# Patient Record
Sex: FEMALE | Race: WHITE | NOT HISPANIC OR LATINO | Employment: OTHER | URBAN - METROPOLITAN AREA
[De-identification: names, ages, dates, MRNs, and addresses within clinical notes are randomized per-mention and may not be internally consistent; named-entity substitution may affect disease eponyms.]

---

## 2017-04-24 ENCOUNTER — OFFICE VISIT (OUTPATIENT)
Dept: FAMILY MEDICINE CLINIC | Facility: CLINIC | Age: 72
End: 2017-04-24

## 2017-04-24 VITALS
OXYGEN SATURATION: 96 % | BODY MASS INDEX: 18.37 KG/M2 | WEIGHT: 107 LBS | TEMPERATURE: 97.7 F | DIASTOLIC BLOOD PRESSURE: 50 MMHG | HEART RATE: 72 BPM | SYSTOLIC BLOOD PRESSURE: 110 MMHG

## 2017-04-24 DIAGNOSIS — R41.3 MEMORY LOSS: ICD-10-CM

## 2017-04-24 DIAGNOSIS — R07.89 ATYPICAL CHEST PAIN: ICD-10-CM

## 2017-04-24 DIAGNOSIS — G62.9 NEUROPATHY: ICD-10-CM

## 2017-04-24 DIAGNOSIS — M79.10 MYALGIA: ICD-10-CM

## 2017-04-24 DIAGNOSIS — G24.9 DYSTONIA: ICD-10-CM

## 2017-04-24 DIAGNOSIS — R53.83 OTHER FATIGUE: ICD-10-CM

## 2017-04-24 DIAGNOSIS — R13.19 OTHER DYSPHAGIA: Primary | ICD-10-CM

## 2017-04-24 LAB
25(OH)D3+25(OH)D2 SERPL-MCNC: 53.8 NG/ML (ref 30–100)
ALBUMIN SERPL-MCNC: 4.3 G/DL (ref 3.5–5.2)
ALBUMIN/GLOB SERPL: 2 G/DL
ALP SERPL-CCNC: 60 U/L (ref 39–117)
ALT SERPL-CCNC: 28 U/L (ref 1–33)
AST SERPL-CCNC: 32 U/L (ref 1–32)
BASOPHILS # BLD AUTO: 0.05 10*3/MM3 (ref 0–0.2)
BASOPHILS NFR BLD AUTO: 1.1 % (ref 0–1.5)
BILIRUB SERPL-MCNC: 0.4 MG/DL (ref 0.1–1.2)
BUN SERPL-MCNC: 10 MG/DL (ref 8–23)
BUN/CREAT SERPL: 13 (ref 7–25)
CALCIUM SERPL-MCNC: 9.3 MG/DL (ref 8.6–10.5)
CHLORIDE SERPL-SCNC: 101 MMOL/L (ref 98–107)
CO2 SERPL-SCNC: 27.6 MMOL/L (ref 22–29)
CREAT SERPL-MCNC: 0.77 MG/DL (ref 0.57–1)
EOSINOPHIL # BLD AUTO: 0.19 10*3/MM3 (ref 0–0.7)
EOSINOPHIL NFR BLD AUTO: 4.1 % (ref 0.3–6.2)
ERYTHROCYTE [DISTWIDTH] IN BLOOD BY AUTOMATED COUNT: 15.8 % (ref 11.7–13)
GLOBULIN SER CALC-MCNC: 2.1 GM/DL
GLUCOSE SERPL-MCNC: 81 MG/DL (ref 65–99)
HCT VFR BLD AUTO: 41.6 % (ref 35.6–45.5)
HGB BLD-MCNC: 13.5 G/DL (ref 11.9–15.5)
IMM GRANULOCYTES # BLD: 0 10*3/MM3 (ref 0–0.03)
IMM GRANULOCYTES NFR BLD: 0 % (ref 0–0.5)
LYMPHOCYTES # BLD AUTO: 1.69 10*3/MM3 (ref 0.9–4.8)
LYMPHOCYTES NFR BLD AUTO: 36 % (ref 19.6–45.3)
MAGNESIUM SERPL-MCNC: 2.4 MG/DL (ref 1.6–2.4)
MCH RBC QN AUTO: 31.2 PG (ref 26.9–32)
MCHC RBC AUTO-ENTMCNC: 32.5 G/DL (ref 32.4–36.3)
MCV RBC AUTO: 96.1 FL (ref 80.5–98.2)
MONOCYTES # BLD AUTO: 0.34 10*3/MM3 (ref 0.2–1.2)
MONOCYTES NFR BLD AUTO: 7.2 % (ref 5–12)
NEUTROPHILS # BLD AUTO: 2.42 10*3/MM3 (ref 1.9–8.1)
NEUTROPHILS NFR BLD AUTO: 51.6 % (ref 42.7–76)
PLATELET # BLD AUTO: 218 10*3/MM3 (ref 140–500)
POTASSIUM SERPL-SCNC: 4.1 MMOL/L (ref 3.5–5.2)
PROT SERPL-MCNC: 6.4 G/DL (ref 6–8.5)
RBC # BLD AUTO: 4.33 10*6/MM3 (ref 3.9–5.2)
SODIUM SERPL-SCNC: 143 MMOL/L (ref 136–145)
TSH SERPL DL<=0.005 MIU/L-ACNC: 2.92 MIU/ML (ref 0.27–4.2)
VIT B12 SERPL-MCNC: 352 PG/ML (ref 211–946)
WBC # BLD AUTO: 4.69 10*3/MM3 (ref 4.5–10.7)

## 2017-04-24 PROCEDURE — 99214 OFFICE O/P EST MOD 30 MIN: CPT | Performed by: FAMILY MEDICINE

## 2017-04-24 RX ORDER — METOPROLOL SUCCINATE 25 MG/1
TABLET, EXTENDED RELEASE ORAL
COMMUNITY
Start: 2016-09-09 | End: 2019-02-07 | Stop reason: DRUGHIGH

## 2017-04-24 RX ORDER — IBUPROFEN 600 MG/1
600 TABLET ORAL EVERY 6 HOURS PRN
COMMUNITY
End: 2017-04-24 | Stop reason: SDUPTHER

## 2017-04-24 RX ORDER — IBUPROFEN 100 MG/1
TABLET, CHEWABLE ORAL
COMMUNITY
End: 2017-04-24

## 2017-04-24 RX ORDER — IBUPROFEN 600 MG/1
600 TABLET ORAL EVERY 6 HOURS PRN
Qty: 360 TABLET | Refills: 1 | Status: SHIPPED | OUTPATIENT
Start: 2017-04-24 | End: 2018-12-27 | Stop reason: HOSPADM

## 2017-04-24 NOTE — PROGRESS NOTES
Subjective   Kesha Cha is a 71 y.o. female. Presents today for   Chief Complaint   Patient presents with   • Follow-up     med check and fasting labs.  Numbness lt hand thumb and index finger.  Swelling inbilateral ankles.  Difficulty swallowing over several years and getting worse.  Getting cramps at night in bilateral legs.  Would like to find exercise to help.         Chest Pain    Episode onset: None for few days;  off/on for few years. The onset quality is gradual. The problem occurs intermittently. The problem has been waxing and waning. The pain is present in the substernal region. The pain is mild. The quality of the pain is described as tightness. The pain does not radiate. Associated symptoms include numbness. Pertinent negatives include no abdominal pain, dizziness, nausea, palpitations, shortness of breath or vomiting. Associated symptoms comments: Pain can last 1 hour. The pain is aggravated by nothing (Not associated with exertion, eating, coughing or breathing.). Treatments tried: Ibuprofen. Improvement on treatment: Ibuprofen resolves pain.   Pertinent negatives for past medical history include no CAD, no CHF, no DVT, no MI and no PE. Prior workup: Had negative nuclear stress and normal ECHO 2014 for same pain.  Patient relates is chronic.  Saw cardiology and EKG unchanged last check.   Lower Extremity Issue   Chronicity: Nocturnal leg cramps;  Denies claudication. The current episode started more than 1 month ago. The problem occurs intermittently. Associated symptoms include chest pain, fatigue, myalgias and numbness. Pertinent negatives include no abdominal pain, arthralgias, nausea or vomiting. Nothing aggravates the symptoms. Treatments tried: REmote past given stretches to do that helped, but doesn't recall. The treatment provided mild relief.   Upper Extremity Issue   This is a new problem. The current episode started more than 1 month ago. The problem occurs intermittently. The problem  has been waxing and waning. Associated symptoms include chest pain, fatigue, myalgias and numbness. Pertinent negatives include no abdominal pain, arthralgias, nausea or vomiting. Associated symptoms comments: 1st 3 digits left hand numb off/on; Notes at night as well;  No weakness, denies dropping objects.  . Nothing aggravates the symptoms. She has tried nothing for the symptoms. The treatment provided no relief.     Patient with ongoing dysphagia;  Having issues with pills, liquid and food.  Has had ongoing complaints for sometime.  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  Small hiatal hernia also noted.  Has not had EGD in past;  Denies abdominal pain, n/v or heartburn.      Patient concerned by progressive memory loss, we have performed in office mmse for patient, but she is concerned as feels worsening.    Review of Systems   Constitutional: Positive for fatigue.   HENT: Positive for trouble swallowing.    Respiratory: Negative for choking, shortness of breath and wheezing.    Cardiovascular: Positive for chest pain. Negative for palpitations and leg swelling.   Gastrointestinal: Negative for abdominal pain, blood in stool, nausea and vomiting.   Musculoskeletal: Positive for myalgias. Negative for arthralgias.   Neurological: Positive for numbness. Negative for dizziness, syncope and light-headedness.       The following portions of the patient's history were reviewed and updated as appropriate: allergies, current medications, past medical history, past surgical history and problem list.    Patient Active Problem List   Diagnosis   • Atopic rhinitis   • Acute otitis media   • Isolated cervical dystonia   • Radial styloid tenosynovitis   • Degeneration of intervertebral disc of lumbar region   • Disorder of jaw   • Diverticulosis of intestine   • Dyslipidemia   • Dysphagia   •  Idiopathic torsion dystonia   • Post-menopausal osteoporosis   • Osteoporosis   • Palpitations   • Reactive airway disease   • Scoliosis   • Arthralgia of temporomandibular joint   • Upper respiratory tract infection   • Atypical chest pain   • Chest pain   • Dystonia       No Known Allergies    Current Outpatient Prescriptions on File Prior to Visit   Medication Sig Dispense Refill   • Multiple Vitamins-Minerals (PX MENS MULTIVITAMINS) tablet Take  by mouth.     • [DISCONTINUED] metoprolol succinate XL (TOPROL-XL) 25 MG 24 hr tablet Take  by mouth.     • [DISCONTINUED] montelukast (SINGULAIR) 10 MG tablet Take  by mouth Daily.       No current facility-administered medications on file prior to visit.        Objective   Vitals:    04/24/17 0926   BP: 110/50   Pulse: 72   Temp: 97.7 °F (36.5 °C)   SpO2: 96%   Weight: 107 lb (48.5 kg)       Physical Exam   Constitutional: She appears well-developed and well-nourished.   HENT:   Head: Normocephalic and atraumatic.   Neck: Neck supple. No JVD present. No thyromegaly present.   Cardiovascular: Normal rate, regular rhythm and normal heart sounds.  Exam reveals no gallop and no friction rub.    No murmur heard.  Pulmonary/Chest: Effort normal and breath sounds normal. No respiratory distress. She has no wheezes. She has no rales.   Abdominal: Soft. Bowel sounds are normal. She exhibits no distension. There is no tenderness. There is no rebound and no guarding.   Musculoskeletal: She exhibits no edema.        Right hand: She exhibits normal range of motion, no tenderness, no bony tenderness, normal capillary refill and no deformity. Normal sensation noted. Decreased sensation is not present in the ulnar distribution and is not present in the radial distribution. Normal strength noted. She exhibits no finger abduction, no thumb/finger opposition and no wrist extension trouble.        Left hand: She exhibits normal range of motion, no tenderness, no bony tenderness, normal  capillary refill, no deformity and no swelling. Decreased sensation noted. Decreased sensation is present in the medial distribution. Decreased sensation is not present in the ulnar distribution and is not present in the radial distribution. Normal strength noted. She exhibits no finger abduction, no thumb/finger opposition and no wrist extension trouble.   Negative tinels on left and right   Neurological: She is alert.   Skin: Skin is warm and dry.   Psychiatric: She has a normal mood and affect. Her behavior is normal.   Nursing note and vitals reviewed.      Assessment/Plan   Kesha was seen today for follow-up.    Diagnoses and all orders for this visit:    Other dysphagia  -     Ambulatory Referral to Gastroenterology  -     Ambulatory Referral to Neuropsychology  -     CBC & Differential  -     Comprehensive Metabolic Panel  -     Magnesium  -     Vitamin D 25 Hydroxy  -     Vitamin B12  -     TSH    Memory loss  -     Ambulatory Referral to Gastroenterology  -     Ambulatory Referral to Neuropsychology  -     CBC & Differential  -     Comprehensive Metabolic Panel  -     Magnesium  -     Vitamin D 25 Hydroxy  -     Vitamin B12  -     TSH    Myalgia  -     Ambulatory Referral to Gastroenterology  -     Ambulatory Referral to Neuropsychology  -     CBC & Differential  -     Comprehensive Metabolic Panel  -     Magnesium  -     Vitamin D 25 Hydroxy  -     Vitamin B12  -     TSH    Atypical chest pain  -     Ambulatory Referral to Gastroenterology  -     Ambulatory Referral to Neuropsychology  -     CBC & Differential  -     Comprehensive Metabolic Panel  -     Magnesium  -     Vitamin D 25 Hydroxy  -     Vitamin B12  -     TSH    Dystonia  -     Ambulatory Referral to Gastroenterology  -     Ambulatory Referral to Neuropsychology  -     CBC & Differential  -     Comprehensive Metabolic Panel  -     Magnesium  -     Vitamin D 25 Hydroxy  -     Vitamin B12  -     TSH    Other fatigue  -     Ambulatory Referral to  Gastroenterology  -     Ambulatory Referral to Neuropsychology  -     CBC & Differential  -     Comprehensive Metabolic Panel  -     Magnesium  -     Vitamin D 25 Hydroxy  -     Vitamin B12  -     TSH    Neuropathy  -     Ambulatory Referral to Gastroenterology  -     Ambulatory Referral to Neuropsychology  -     CBC & Differential  -     Comprehensive Metabolic Panel  -     Magnesium  -     Vitamin D 25 Hydroxy  -     Vitamin B12  -     TSH    Other orders  -     ibuprofen (ADVIL,MOTRIN) 600 MG tablet; Take 1 tablet by mouth Every 6 (Six) Hours As Needed for Mild Pain (1-3).    -patient reports CP, but relates unchanged and infrequent from prior and when work-up in past.  Takes ibuprofen which relieves, sounds more musculoskeletal, does have f/u with cardiology soon.  -neuropathy left hand, possible CTS, will check some labs as can seen neuropathies with thyroid, b12 def and also c/o fatigue as well as memory loss  -recommend neuropsych exam and agreeable today  -nocturnal leg cramps - check lytes, thyroid, cbc and gave stretches to perform  -dysphagia - will refer to GI for evaluation.           -Follow up: after w/u        Current Outpatient Prescriptions:   •  ascorbic acid (VITAMIN C) 1500 MG tablet controlled-release CR tablet, Take  by mouth., Disp: , Rfl:   •  DIGEST ENZYMES-ANTICHOLINERGIC PO, Take  by mouth., Disp: , Rfl:   •  GLUCOSAMINE HCL PO, Take  by mouth., Disp: , Rfl:   •  ibuprofen (ADVIL,MOTRIN) 600 MG tablet, Take 1 tablet by mouth Every 6 (Six) Hours As Needed for Mild Pain (1-3)., Disp: 360 tablet, Rfl: 1  •  metoprolol succinate XL (TOPROL-XL) 25 MG 24 hr tablet, TAKE 1 TABLET EVERY DAY, Disp: , Rfl:   •  Multiple Vitamins-Minerals (PX MENS MULTIVITAMINS) tablet, Take  by mouth., Disp: , Rfl:   •  onabotulinumtoxina (BOTOX) 100 UNITS reconstituted solution injection, Inject as directed, Disp: , Rfl:   •  SELENIUM PO, Take  by mouth., Disp: , Rfl:

## 2017-05-08 ENCOUNTER — OFFICE VISIT (OUTPATIENT)
Dept: FAMILY MEDICINE CLINIC | Facility: CLINIC | Age: 72
End: 2017-05-08

## 2017-05-08 VITALS
BODY MASS INDEX: 18.44 KG/M2 | SYSTOLIC BLOOD PRESSURE: 110 MMHG | WEIGHT: 108 LBS | DIASTOLIC BLOOD PRESSURE: 60 MMHG | OXYGEN SATURATION: 96 % | HEART RATE: 79 BPM | TEMPERATURE: 97.8 F | HEIGHT: 64 IN

## 2017-05-08 DIAGNOSIS — M81.0 OSTEOPOROSIS: ICD-10-CM

## 2017-05-08 DIAGNOSIS — Z91.81 AT HIGH RISK FOR FALLS: ICD-10-CM

## 2017-05-08 DIAGNOSIS — Z00.00 MEDICARE ANNUAL WELLNESS VISIT, INITIAL: Primary | ICD-10-CM

## 2017-05-08 DIAGNOSIS — R26.9 GAIT ABNORMALITY: ICD-10-CM

## 2017-05-08 DIAGNOSIS — M81.0 POST-MENOPAUSAL OSTEOPOROSIS: ICD-10-CM

## 2017-05-08 DIAGNOSIS — Z12.31 ENCOUNTER FOR SCREENING MAMMOGRAM FOR MALIGNANT NEOPLASM OF BREAST: ICD-10-CM

## 2017-05-08 DIAGNOSIS — Z12.39 SCREENING FOR MALIGNANT NEOPLASM OF BREAST: ICD-10-CM

## 2017-05-08 PROCEDURE — G0439 PPPS, SUBSEQ VISIT: HCPCS | Performed by: FAMILY MEDICINE

## 2017-06-06 ENCOUNTER — TELEPHONE (OUTPATIENT)
Dept: FAMILY MEDICINE CLINIC | Facility: CLINIC | Age: 72
End: 2017-06-06

## 2017-06-06 NOTE — TELEPHONE ENCOUNTER
That isn't that bad of a BP.  Sent for gait, balance and dizziness.  Also did we giver her the vestibular exercise handout (it's in filing cabinent) by your desk?  Keep an eye on BP and let me know if bottom number stays above 90.

## 2017-06-08 NOTE — TELEPHONE ENCOUNTER
torin just called and told me pt cancelled her appt. She told them she is having chest pain and pressure and feeling very lethargic. Ok to call and tell her to go to ER?

## 2017-06-08 NOTE — TELEPHONE ENCOUNTER
Pt said she laid down and took a nap, felt better when she woke up, has been to the grocery and done housework, and is no longer having chest pressure

## 2017-06-08 NOTE — TELEPHONE ENCOUNTER
Call and check on patient after lunch.  Having chest pressure and dizziness is concerning.  Very unfortunate not going as could potentially be life threatening.    RRJ

## 2017-06-08 NOTE — TELEPHONE ENCOUNTER
I called patient, told her you want her to go to ER asap. She said she is not going to go, she just wants to rest. I told her she should not wait, and really should go get checked out since she is having chest pressure, she again told me she is not going to go, and is going to stay home and rest.

## 2017-06-16 ENCOUNTER — HOSPITAL ENCOUNTER (OUTPATIENT)
Dept: HOSPITAL 23 - COPS | Age: 72
Discharge: HOME | End: 2017-06-16
Attending: INTERNAL MEDICINE
Payer: MEDICARE

## 2017-06-16 DIAGNOSIS — K29.60: ICD-10-CM

## 2017-06-16 DIAGNOSIS — Z79.899: ICD-10-CM

## 2017-06-16 DIAGNOSIS — M19.90: ICD-10-CM

## 2017-06-16 DIAGNOSIS — J44.9: ICD-10-CM

## 2017-06-16 DIAGNOSIS — D64.9: ICD-10-CM

## 2017-06-16 DIAGNOSIS — M41.9: ICD-10-CM

## 2017-06-16 DIAGNOSIS — K22.2: Primary | ICD-10-CM

## 2017-06-16 DIAGNOSIS — Z90.710: ICD-10-CM

## 2017-06-16 DIAGNOSIS — I10: ICD-10-CM

## 2017-06-16 DIAGNOSIS — Z90.721: ICD-10-CM

## 2017-06-16 DIAGNOSIS — Z98.890: ICD-10-CM

## 2017-07-11 ENCOUNTER — TREATMENT (OUTPATIENT)
Dept: PHYSICAL THERAPY | Facility: CLINIC | Age: 72
End: 2017-07-11

## 2017-07-11 ENCOUNTER — TRANSCRIBE ORDERS (OUTPATIENT)
Dept: PHYSICAL THERAPY | Facility: CLINIC | Age: 72
End: 2017-07-11

## 2017-07-11 DIAGNOSIS — M25.561 PAIN IN BOTH KNEES, UNSPECIFIED CHRONICITY: Primary | ICD-10-CM

## 2017-07-11 DIAGNOSIS — M25.562 PAIN IN BOTH KNEES, UNSPECIFIED CHRONICITY: Primary | ICD-10-CM

## 2017-07-11 DIAGNOSIS — M17.0 OSTEOARTHRITIS OF BOTH KNEES, UNSPECIFIED OSTEOARTHRITIS TYPE: Primary | ICD-10-CM

## 2017-07-11 PROCEDURE — 97110 THERAPEUTIC EXERCISES: CPT | Performed by: PHYSICAL THERAPIST

## 2017-07-11 PROCEDURE — G8979 MOBILITY GOAL STATUS: HCPCS | Performed by: PHYSICAL THERAPIST

## 2017-07-11 PROCEDURE — 97161 PT EVAL LOW COMPLEX 20 MIN: CPT | Performed by: PHYSICAL THERAPIST

## 2017-07-11 PROCEDURE — 97530 THERAPEUTIC ACTIVITIES: CPT | Performed by: PHYSICAL THERAPIST

## 2017-07-11 PROCEDURE — G8978 MOBILITY CURRENT STATUS: HCPCS | Performed by: PHYSICAL THERAPIST

## 2017-07-11 NOTE — PATIENT INSTRUCTIONS
Educated about Dx and exam findings, rationale and POC. Gave handout on HEP with instructions.  Educated about knee anatomy and contributing factors and basic protection principles.

## 2017-07-11 NOTE — PROGRESS NOTES
Physical Therapy Initial Evaluation and Plan of Care        Subjective Evaluation    History of Present Illness  Date of surgery: none.  Mechanism of injury: Insidious - pain since the 80s.  Recently started walking in the neighborhood and knees flared up badly.  Also with dystonia which affects muscles.      Patient Occupation: retired teacher Pain  Current pain ratin  At worst pain ratin  Location: ant knees  Quality: sharp  Relieving factors: rest  Aggravating factors: stairs, ambulation and squatting  Progression: worsening    Social Support  Lives in: multiple-level home  Lives with: alone    Diagnostic Tests  X-ray: abnormal (arthritis)    Treatments  Treatments tried: none.  Current treatment comments: none.     Patient Goals  Patient goals for therapy: decreased pain and return to sport/leisure activities  Patient goal: STGs x 2 wk  1. Increasing flexibility for improved ADLs  2. Pain with WB ADLs < 6//10  3. Review body mechanics and joint protection principles with ADLs  4. Ambulates level surface and 4 inch steps with min to no antalgia    LTGs x 4 wks  1. Functional squat for ADLs  2. Negative special testing for derangement  3. Ambulates  flight of stairs with normal gait  4. Demonstrates tolerance for walking for 30 minutes for exercise and ADLs           Objective     Tenderness     Additional Tenderness Details  No TTP    Active Range of Motion   Left Knee   Normal active range of motion    Right Knee   Normal active range of motion    Patellar Mobility   Left Knee Patellar tendons within functional limits include the medial and lateral. Hypomobile in the left superior and left inferior patellar tendon(s).     Right Knee Patellar tendons within functional limits include the medial, lateral, superior and inferior.     Additional Patellar Mobility Details  +crepitus lory    Strength/Myotome Testing     Left Knee   Normal strength    Right Knee   Normal strength    Additional Strength  Details  Hips WNL lory    Tests     Left Knee   Positive patellar compression.   Negative valgus stress test at 30 degrees and varus stress test at 30 degrees.     Right Knee   Positive patellar compression.   Negative valgus stress test at 30 degrees and varus stress test at 30 degrees.     Additional Tests Details  Mod-severe tightness HS, quad, ITB, gastroc    Ambulation     Ambulation: Level Surfaces   Ambulation without assistive device: independent    Additional Level Surfaces Ambulation Details  No notable limp         Assessment & Plan     Assessment  Impairments: abnormal muscle tone, activity intolerance, lacks appropriate home exercise program and pain with function  Assessment details: 70 yo F with recent flare-up of pain lory knees from increased walking presents with mod-severe hip/knee mm tightness, Hx of dystonia and abnormal PF mechanics.  Barriers to therapy: dystonia  Prognosis: fair    Goals  STGs x 2 wk  1. Increasing flexibility for improved ADLs  2. Pain with WB ADLs < 6//10  3. Review body mechanics and joint protection principles with ADLs  4. Ambulates level surface and 4 inch steps with min to no antalgia    LTGs x 4 wks  1. Functional squat for ADLs  2. Negative special testing for derangement  3. Ambulates  flight of stairs with normal gait  4. Demonstrates tolerance for walking for 30 minutes for exercise and ADLs    Plan  Therapy options: will be seen for skilled physical therapy services  Planned modality interventions: cryotherapy, thermotherapy (hydrocollator packs) and electrical stimulation/Russian stimulation  Planned therapy interventions: body mechanics training, flexibility, functional ROM exercises, home exercise program, strengthening, stretching and manual therapy  Frequency: 3x week  Duration in weeks: 4  Treatment plan discussed with: patient  Functional Limitations: walking, uncomfortable because of pain, standing, stooping and unable to perform repetitive  tasks      Manual Therapy:    0     mins  95281;  Therapeutic Exercise:    16     mins  57759;     Neuromuscular Xuan:    0    mins  21282;    Therapeutic Activity:     10     mins  28808;     Gait Trainin     mins  43945;     Ultrasound:     0     mins  98885;    Work Hardening           0      mins 55344  Iontophoresis               0   mins 34383    Timed Treatment:   26   mins   Total Treatment:     50   mins    PT SIGNATURE: Darby Huber, PT   DATE TREATMENT INITIATED: 2017    Medicare Initial Certification  Certification Period: 10/9/2017  I certify that the therapy services are furnished while this patient is under my care.  The services outlined above are required by this patient, and will be reviewed every 90 days.     PHYSICIAN: Eleuterio Samaniego MD      DATE:     Please sign and return via fax to [phone].. Thank you, [facility].

## 2017-07-14 ENCOUNTER — TREATMENT (OUTPATIENT)
Dept: PHYSICAL THERAPY | Facility: CLINIC | Age: 72
End: 2017-07-14

## 2017-07-14 DIAGNOSIS — M17.0 OSTEOARTHRITIS OF BOTH KNEES, UNSPECIFIED OSTEOARTHRITIS TYPE: Primary | ICD-10-CM

## 2017-07-14 PROCEDURE — 97530 THERAPEUTIC ACTIVITIES: CPT | Performed by: PHYSICAL THERAPIST

## 2017-07-14 PROCEDURE — 97110 THERAPEUTIC EXERCISES: CPT | Performed by: PHYSICAL THERAPIST

## 2017-07-14 NOTE — PROGRESS NOTES
Physical Therapy Daily Progress Note        Visit # : 2  Kesha Cha reports: I am more sore after I do the exercises    Subjective     Objective     Palpation     Additional Palpation Details  Palpable crepitus with knee flexion AROM    Tenderness     Right Knee   Tenderness in the MCL (distal) and medial joint line.      See Exercise, Manual, and Modality Logs for complete treatment.       Assessment & Plan     Assessment  Assessment details: Ms. Cha presents with no significant improvement in bilateral knee pain. Tenderness, crepitus and pain end-range persists. Educated patient about arthritis and need for strengthening and stabilization. Tolerated all progressions well. Cont PT 2x per week for bilateral knee stabilization - next visit attempt standing HS curl, and/or resisted knee flexion and extension if tolerated.         Progress strengthening /stabilization /functional activity           Manual Therapy:         mins  44079;  Therapeutic Exercise:   30     mins  59232;     Neuromuscular Xuan:        mins  40195;    Therapeutic Activity:     20     mins  01224;     Gait Training:           mins  84977;     Ultrasound:          mins  79092;    Electrical Stimulation:         mins  13349 ( );  Dry Needling          mins self-pay    Timed Treatment:   50   mins   Total Treatment:     60   mins    Bambi Bang, PT  Physical Therapist  [place] License # [id number]

## 2017-10-17 ENCOUNTER — TELEPHONE (OUTPATIENT)
Dept: FAMILY MEDICINE CLINIC | Facility: CLINIC | Age: 72
End: 2017-10-17

## 2017-10-17 DIAGNOSIS — Z91.81 PERSONAL HISTORY OF FALL: Primary | ICD-10-CM

## 2017-10-17 DIAGNOSIS — R26.9 ABNORMALITY OF GAIT: ICD-10-CM

## 2017-10-25 ENCOUNTER — TELEPHONE (OUTPATIENT)
Dept: FAMILY MEDICINE CLINIC | Facility: CLINIC | Age: 72
End: 2017-10-25

## 2017-11-03 ENCOUNTER — OFFICE VISIT (OUTPATIENT)
Dept: FAMILY MEDICINE CLINIC | Facility: CLINIC | Age: 72
End: 2017-11-03

## 2017-11-03 VITALS
BODY MASS INDEX: 18.1 KG/M2 | HEART RATE: 74 BPM | DIASTOLIC BLOOD PRESSURE: 70 MMHG | WEIGHT: 106 LBS | OXYGEN SATURATION: 96 % | TEMPERATURE: 98.3 F | HEIGHT: 64 IN | SYSTOLIC BLOOD PRESSURE: 118 MMHG

## 2017-11-03 DIAGNOSIS — R07.89 ATYPICAL CHEST PAIN: Primary | ICD-10-CM

## 2017-11-03 PROCEDURE — 99213 OFFICE O/P EST LOW 20 MIN: CPT | Performed by: FAMILY MEDICINE

## 2017-11-03 RX ORDER — ACETYLCARNITINE HCL 100 %
POWDER (GRAM) MISCELLANEOUS
COMMUNITY
End: 2018-12-27 | Stop reason: HOSPADM

## 2017-11-03 RX ORDER — UBIDECARENONE 60 MG
CAPSULE ORAL
COMMUNITY
End: 2018-12-27 | Stop reason: HOSPADM

## 2017-11-03 NOTE — PROGRESS NOTES
Subjective   Kesha Cha is a 71 y.o. female. Presents today for   Chief Complaint   Patient presents with   • Medication Problem      pt was taking alpha hgh. she was having pressure in her head, chest discomfort, and nose bleed that was hard to stop.        History of Present Illness  Patient here for above;  Was taking ALPHA HGH GLYCERYL PHOSPHORYL CHOLINE WITH HOMEOPATHIC GROWTH HORMONE and developed head pressure, chest discomfort and a nose bleed.  Did stop supplement and symptoms completely resolved;  No further chest pain;       Review of Systems   Respiratory: Negative for shortness of breath.    Cardiovascular: Negative for chest pain and palpitations.   Gastrointestinal: Negative for abdominal pain, nausea and vomiting.   Skin: Negative for rash.       The following portions of the patient's history were reviewed and updated as appropriate: allergies, current medications, past medical history and problem list.    Patient Active Problem List   Diagnosis   • Atopic rhinitis   • Acute otitis media   • Isolated cervical dystonia   • Radial styloid tenosynovitis   • Degeneration of intervertebral disc of lumbar region   • Disorder of jaw   • Diverticulosis of intestine   • Dyslipidemia   • Dysphagia   • Idiopathic torsion dystonia   • Post-menopausal osteoporosis   • Osteoporosis   • Palpitations   • Reactive airway disease   • Scoliosis   • Arthralgia of temporomandibular joint   • Upper respiratory tract infection   • Atypical chest pain   • Chest pain   • Dystonia       No Known Allergies    Current Outpatient Prescriptions on File Prior to Visit   Medication Sig Dispense Refill   • ascorbic acid (VITAMIN C) 1500 MG tablet controlled-release CR tablet Take  by mouth.     • DIGEST ENZYMES-ANTICHOLINERGIC PO Take  by mouth.     • GLUCOSAMINE HCL PO Take  by mouth.     • GLUTATHIONE PO Take  by mouth.     • ibuprofen (ADVIL,MOTRIN) 600 MG tablet Take 1 tablet by mouth Every 6 (Six) Hours As Needed for  "Mild Pain (1-3). 360 tablet 1   • metoprolol succinate XL (TOPROL-XL) 25 MG 24 hr tablet TAKE 1 TABLET EVERY DAY     • Multiple Vitamins-Minerals (PX MENS MULTIVITAMINS) tablet Take  by mouth.     • onabotulinumtoxina (BOTOX) 100 UNITS reconstituted solution injection Inject as directed     • SELENIUM PO Take  by mouth.       No current facility-administered medications on file prior to visit.        Objective   Vitals:    11/03/17 1322   BP: 118/70   BP Location: Left arm   Patient Position: Sitting   Cuff Size: Adult   Pulse: 74   Temp: 98.3 °F (36.8 °C)   TempSrc: Oral   SpO2: 96%   Weight: 106 lb (48.1 kg)   Height: 64\" (162.6 cm)       Physical Exam   Constitutional: She is oriented to person, place, and time. She appears well-developed and well-nourished.   Neurological: She is alert and oriented to person, place, and time.   Skin: Skin is warm and dry.   Psychiatric: She has a normal mood and affect. Her behavior is normal.   Nursing note and vitals reviewed.      Assessment/Plan   Kesah was seen today for medication problem.    Diagnoses and all orders for this visit:    Atypical chest pain      -avoid supplement;  D/w at length healthy diet and regular exercise to maintain health and wellness       -Follow up: 6 months       Current Outpatient Prescriptions:   •  Acetylcarnitine HCl (ACETYL-L-CARNITINE HCL) powder, Take  by mouth., Disp: , Rfl:   •  ascorbic acid (VITAMIN C) 1500 MG tablet controlled-release CR tablet, Take  by mouth., Disp: , Rfl:   •  Coenzyme Q10 60 MG capsule, Take  by mouth., Disp: , Rfl:   •  DIGEST ENZYMES-ANTICHOLINERGIC PO, Take  by mouth., Disp: , Rfl:   •  GLUCOSAMINE HCL PO, Take  by mouth., Disp: , Rfl:   •  GLUTATHIONE PO, Take  by mouth., Disp: , Rfl:   •  ibuprofen (ADVIL,MOTRIN) 600 MG tablet, Take 1 tablet by mouth Every 6 (Six) Hours As Needed for Mild Pain (1-3)., Disp: 360 tablet, Rfl: 1  •  IODINE EX, by Per G Tube route., Disp: , Rfl:   •  MAGNESIUM PO, Take  by " mouth., Disp: , Rfl:   •  metoprolol succinate XL (TOPROL-XL) 25 MG 24 hr tablet, TAKE 1 TABLET EVERY DAY, Disp: , Rfl:   •  Multiple Vitamins-Minerals (PX MENS MULTIVITAMINS) tablet, Take  by mouth., Disp: , Rfl:   •  NIACIN PO, Take  by mouth., Disp: , Rfl:   •  onabotulinumtoxina (BOTOX) 100 UNITS reconstituted solution injection, Inject as directed, Disp: , Rfl:   •  SELENIUM PO, Take  by mouth., Disp: , Rfl:

## 2018-03-14 ENCOUNTER — TELEPHONE (OUTPATIENT)
Dept: FAMILY MEDICINE CLINIC | Facility: CLINIC | Age: 73
End: 2018-03-14

## 2018-03-16 DIAGNOSIS — E78.5 DYSLIPIDEMIA: ICD-10-CM

## 2018-03-16 DIAGNOSIS — E55.9 VITAMIN D DEFICIENCY: Primary | ICD-10-CM

## 2018-03-29 LAB
25(OH)D3+25(OH)D2 SERPL-MCNC: 63.2 NG/ML (ref 30–100)
ALBUMIN SERPL-MCNC: 4.6 G/DL (ref 3.5–5.2)
ALBUMIN/GLOB SERPL: 2.6 G/DL
ALP SERPL-CCNC: 57 U/L (ref 39–117)
ALT SERPL-CCNC: 25 U/L (ref 1–33)
AST SERPL-CCNC: 30 U/L (ref 1–32)
BILIRUB SERPL-MCNC: 0.5 MG/DL (ref 0.1–1.2)
BUN SERPL-MCNC: 15 MG/DL (ref 8–23)
BUN/CREAT SERPL: 19.5 (ref 7–25)
CALCIUM SERPL-MCNC: 9.5 MG/DL (ref 8.6–10.5)
CHLORIDE SERPL-SCNC: 101 MMOL/L (ref 98–107)
CHOLEST SERPL-MCNC: 203 MG/DL (ref 0–200)
CO2 SERPL-SCNC: 30.6 MMOL/L (ref 22–29)
CREAT SERPL-MCNC: 0.77 MG/DL (ref 0.57–1)
GFR SERPLBLD CREATININE-BSD FMLA CKD-EPI: 73 ML/MIN/1.73
GFR SERPLBLD CREATININE-BSD FMLA CKD-EPI: 89 ML/MIN/1.73
GLOBULIN SER CALC-MCNC: 1.8 GM/DL
GLUCOSE SERPL-MCNC: 75 MG/DL (ref 65–99)
HDLC SERPL-MCNC: 86 MG/DL (ref 40–60)
LDLC SERPL CALC-MCNC: 105 MG/DL (ref 0–100)
POTASSIUM SERPL-SCNC: 4.2 MMOL/L (ref 3.5–5.2)
PROT SERPL-MCNC: 6.4 G/DL (ref 6–8.5)
SODIUM SERPL-SCNC: 143 MMOL/L (ref 136–145)
TRIGL SERPL-MCNC: 58 MG/DL (ref 0–150)
VLDLC SERPL CALC-MCNC: 11.6 MG/DL (ref 5–40)

## 2018-04-03 ENCOUNTER — OFFICE VISIT (OUTPATIENT)
Dept: FAMILY MEDICINE CLINIC | Facility: CLINIC | Age: 73
End: 2018-04-03

## 2018-04-03 VITALS
OXYGEN SATURATION: 96 % | HEART RATE: 79 BPM | SYSTOLIC BLOOD PRESSURE: 102 MMHG | TEMPERATURE: 97.6 F | HEIGHT: 64 IN | BODY MASS INDEX: 17.75 KG/M2 | WEIGHT: 104 LBS | DIASTOLIC BLOOD PRESSURE: 60 MMHG

## 2018-04-03 DIAGNOSIS — G24.3 ISOLATED CERVICAL DYSTONIA: ICD-10-CM

## 2018-04-03 DIAGNOSIS — E78.5 DYSLIPIDEMIA: Primary | ICD-10-CM

## 2018-04-03 DIAGNOSIS — I25.10 CORONARY ARTERY DISEASE INVOLVING NATIVE CORONARY ARTERY OF NATIVE HEART WITHOUT ANGINA PECTORIS: ICD-10-CM

## 2018-04-03 PROCEDURE — 99213 OFFICE O/P EST LOW 20 MIN: CPT | Performed by: FAMILY MEDICINE

## 2018-04-03 NOTE — PROGRESS NOTES
Subjective   Kesha Cha is a 72 y.o. female. Presents today for   Chief Complaint   Patient presents with   • Appointment     pt here for med and lab review       History of Present Illness    Patient reports had CT noted non-obstructive CAD and sees cardiology annually.   Had preappt labs cholesterol good, though not goal technically for atherosclerosis.  Review of Systems    The following portions of the patient's history were reviewed and updated as appropriate: allergies, current medications, past medical history and problem list.    Patient Active Problem List   Diagnosis   • Atopic rhinitis   • Acute otitis media   • Isolated cervical dystonia   • Radial styloid tenosynovitis   • Degeneration of intervertebral disc of lumbar region   • Disorder of jaw   • Diverticulosis of intestine   • Dyslipidemia   • Dysphagia   • Idiopathic torsion dystonia   • Post-menopausal osteoporosis   • Osteoporosis   • Palpitations   • Reactive airway disease   • Scoliosis   • Arthralgia of temporomandibular joint   • Upper respiratory tract infection   • Atypical chest pain   • Chest pain   • Dystonia       No Known Allergies    Current Outpatient Prescriptions on File Prior to Visit   Medication Sig Dispense Refill   • Acetylcarnitine HCl (ACETYL-L-CARNITINE HCL) powder Take  by mouth.     • ascorbic acid (VITAMIN C) 1500 MG tablet controlled-release CR tablet Take  by mouth.     • Coenzyme Q10 60 MG capsule Take  by mouth.     • DIGEST ENZYMES-ANTICHOLINERGIC PO Take  by mouth.     • GLUCOSAMINE HCL PO Take  by mouth.     • GLUTATHIONE PO Take  by mouth.     • ibuprofen (ADVIL,MOTRIN) 600 MG tablet Take 1 tablet by mouth Every 6 (Six) Hours As Needed for Mild Pain (1-3). 360 tablet 1   • IODINE EX by Per G Tube route.     • MAGNESIUM PO Take  by mouth.     • metoprolol succinate XL (TOPROL-XL) 25 MG 24 hr tablet TAKE 1 TABLET EVERY DAY     • Multiple Vitamins-Minerals (PX MENS MULTIVITAMINS) tablet Take  by mouth.     •  "NIACIN PO Take  by mouth.     • onabotulinumtoxina (BOTOX) 100 UNITS reconstituted solution injection Inject as directed     • SELENIUM PO Take  by mouth.       No current facility-administered medications on file prior to visit.        Objective   Vitals:    18 1314   BP: 102/60   BP Location: Left arm   Patient Position: Sitting   Cuff Size: Adult   Pulse: 79   Temp: 97.6 °F (36.4 °C)   TempSrc: Oral   SpO2: 96%   Weight: 47.2 kg (104 lb)   Height: 162.6 cm (64.02\")       Physical Exam  Result Impression   PROCEDURE/STUDY NOTE    FACILITY:         [facility]  UNIT/AGE/GENDER:  CT   CLI     71Y    F  PATIENT NAME/: DUARTE ELENA        1945  UNIT NUMBER:      [id number]  ACCOUNT NUMBER: [id number]  ACCESSION NUMBER: [id number]             DATE: 2017    REFERRING PHYSICIAN(S): BRENDAN Amado M.D.    INDICATION(S): Chest pains.     FINDING(S):  Calcium score:   Left main: 0.    Left anterior descendin.68.     Circumflex: 3.81.     Right coronary artery: 33.87.     Total Agatston score: 142.36     Left main arises from the left sinus and is normal and bifurcates into   left anterior descending and circumflex coronary arteries.     Left anterior descending artery is a medium to large caliber vessel which   shows proximal 30-40% stenosis x2 and then luminal irregularities.    Septal branch normal.     Large diagonal 1: Normal.    Small diagonal 2: Normal.     Circumflex is a medium caliber vessel with luminal irregularities and   otherwise normal.     Large marginal normal.     Small second marginal normal.   RCA: Arises from the right sinus and is the dominant vessel and is normal   except for mild calcific plaques and luminal irregularities.     Marginal vessel normal.     Posterior descending normal.       Left lateral branch normal.    Normal right and left ventricular wall motion with a calculated left   ventricular ejection fraction of 62% and right ventricular " ejection   fraction of 64%. Please refer to the radiology report for the non-cardiac   part of the CT examination.      LETICIA JACOBS M.D.    D: 05/30/2017 17:05:55  T: 05/30/2017 18:10:43/\[facility] [place] [facility]\""  Rodriguez ADHIKARI [id number]    COPY TO:      PRELIMINARY - SIGNED COPY IS FINAL     Component      Latest Ref Rng & Units 4/16/2015 4/24/2017 3/29/2018   Glucose      65 - 99 mg/dL 78 81 75   BUN      8 - 23 mg/dL 12 10 15   Creatinine      0.57 - 1.00 mg/dL 0.74 0.77 0.77   eGFR Non African Amer      >60 mL/min/1.73 >60 73 73   eGFR  African Amer      >60 mL/min/1.73 >60 89 89   BUN/Creatinine Ratio      7.0 - 25.0 16 13.0 19.5   Sodium      136 - 145 mmol/L 141 143 143   Potassium      3.5 - 5.2 mmol/L 4.1 4.1 4.2   Chloride      98 - 107 mmol/L 100 101 101   Total CO2      22.0 - 29.0 mmol/L 28 27.6 30.6 (H)   Calcium      8.6 - 10.5 mg/dL 9.4 9.3 9.5   Total Protein      6.0 - 8.5 g/dL 6.7 6.4 6.4   Albumin      3.50 - 5.20 g/dL 4.5 4.30 4.60   Globulin      gm/dL 2.2 2.1 1.8   A/G Ratio      g/dL 2.0 2.0 2.6   Total Bilirubin      0.1 - 1.2 mg/dL 0.4 0.4 0.5   Alkaline Phosphatase      39 - 117 U/L 66 60 57   AST (SGOT)      1 - 32 U/L 25 32 30   ALT (SGPT)      1 - 33 U/L 19 28 25   Total Cholesterol      0 - 200 mg/dL 204 (H)  203 (H)   Triglycerides      0 - 150 mg/dL 79  58   HDL Cholesterol      40 - 60 mg/dL 84 (H)  86 (H)   VLDL Cholesterol      5 - 40 mg/dL 16  11.6   LDL Cholesterol       0 - 100 mg/dL 104 (H)  105 (H)   25 Hydroxy, Vitamin D      30.0 - 100.0 ng/ml   63.2     Assessment/Plan   Kesha was seen today for appointment.    Diagnoses and all orders for this visit:    Dyslipidemia    Isolated cervical dystonia    Coronary artery disease involving native coronary artery of native heart without angina pectoris        -given non-obstructive cad, recommend consider statin therapy;  rajiv will discuss with cardiology       -Follow up: 12 months       Current Outpatient Prescriptions:   •  Acetylcarnitine HCl  (ACETYL-L-CARNITINE HCL) powder, Take  by mouth., Disp: , Rfl:   •  ascorbic acid (VITAMIN C) 1500 MG tablet controlled-release CR tablet, Take  by mouth., Disp: , Rfl:   •  Coenzyme Q10 60 MG capsule, Take  by mouth., Disp: , Rfl:   •  DIGEST ENZYMES-ANTICHOLINERGIC PO, Take  by mouth., Disp: , Rfl:   •  GLUCOSAMINE HCL PO, Take  by mouth., Disp: , Rfl:   •  GLUTATHIONE PO, Take  by mouth., Disp: , Rfl:   •  ibuprofen (ADVIL,MOTRIN) 600 MG tablet, Take 1 tablet by mouth Every 6 (Six) Hours As Needed for Mild Pain (1-3)., Disp: 360 tablet, Rfl: 1  •  IODINE EX, by Per G Tube route., Disp: , Rfl:   •  MAGNESIUM PO, Take  by mouth., Disp: , Rfl:   •  metoprolol succinate XL (TOPROL-XL) 25 MG 24 hr tablet, TAKE 1 TABLET EVERY DAY, Disp: , Rfl:   •  Multiple Vitamins-Minerals (PX MENS MULTIVITAMINS) tablet, Take  by mouth., Disp: , Rfl:   •  NIACIN PO, Take  by mouth., Disp: , Rfl:   •  onabotulinumtoxina (BOTOX) 100 UNITS reconstituted solution injection, Inject as directed, Disp: , Rfl:   •  SELENIUM PO, Take  by mouth., Disp: , Rfl:

## 2018-04-03 NOTE — PROGRESS NOTES
Subjective   Kesha Cha is a 72 y.o. female. Presents today for   Chief Complaint   Patient presents with   • Appointment     pt here for med and lab review       Hyperlipidemia   This is a chronic problem. The current episode started more than 1 year ago. Condition status: Has been fine, though since last check on CTA 40% LAD lesion noted.   Not on statin tx. Recent lipid tests were reviewed and are high. She has no history of diabetes or hypothyroidism. Factors aggravating her hyperlipidemia include beta blockers. Associated symptoms include chest pain (chronic chest pain that is unchanged.  Has dystonia and attributes to this.). Pertinent negatives include no shortness of breath. She is currently on no antihyperlipidemic treatment. Improvement on treatment: HDL excellent, LDL okay but not goal for CAD. There are no compliance problems.  Risk factors for coronary artery disease include dyslipidemia and post-menopausal.       Reviewed labs with patient vitamin D normal,  CMP normal    Review of Systems   Respiratory: Negative for shortness of breath.    Cardiovascular: Positive for chest pain (chronic chest pain that is unchanged.  Has dystonia and attributes to this.).       The following portions of the patient's history were reviewed and updated as appropriate: allergies, current medications, past medical history and problem list.    Patient Active Problem List   Diagnosis   • Atopic rhinitis   • Acute otitis media   • Isolated cervical dystonia   • Radial styloid tenosynovitis   • Degeneration of intervertebral disc of lumbar region   • Disorder of jaw   • Diverticulosis of intestine   • Dyslipidemia   • Dysphagia   • Idiopathic torsion dystonia   • Post-menopausal osteoporosis   • Osteoporosis   • Palpitations   • Reactive airway disease   • Scoliosis   • Arthralgia of temporomandibular joint   • Upper respiratory tract infection   • Atypical chest pain   • Chest pain   • Dystonia       No Known  "Allergies    Current Outpatient Prescriptions on File Prior to Visit   Medication Sig Dispense Refill   • Acetylcarnitine HCl (ACETYL-L-CARNITINE HCL) powder Take  by mouth.     • ascorbic acid (VITAMIN C) 1500 MG tablet controlled-release CR tablet Take  by mouth.     • Coenzyme Q10 60 MG capsule Take  by mouth.     • DIGEST ENZYMES-ANTICHOLINERGIC PO Take  by mouth.     • GLUCOSAMINE HCL PO Take  by mouth.     • GLUTATHIONE PO Take  by mouth.     • ibuprofen (ADVIL,MOTRIN) 600 MG tablet Take 1 tablet by mouth Every 6 (Six) Hours As Needed for Mild Pain (1-3). 360 tablet 1   • IODINE EX by Per G Tube route.     • MAGNESIUM PO Take  by mouth.     • metoprolol succinate XL (TOPROL-XL) 25 MG 24 hr tablet TAKE 1 TABLET EVERY DAY     • Multiple Vitamins-Minerals (PX MENS MULTIVITAMINS) tablet Take  by mouth.     • NIACIN PO Take  by mouth.     • onabotulinumtoxina (BOTOX) 100 UNITS reconstituted solution injection Inject as directed     • SELENIUM PO Take  by mouth.       No current facility-administered medications on file prior to visit.        Objective   Vitals:    04/03/18 1314   BP: 102/60   BP Location: Left arm   Patient Position: Sitting   Cuff Size: Adult   Pulse: 79   Temp: 97.6 °F (36.4 °C)   TempSrc: Oral   SpO2: 96%   Weight: 47.2 kg (104 lb)   Height: 162.6 cm (64.02\")       Physical Exam   Constitutional: She appears well-developed and well-nourished.   HENT:   Head: Normocephalic and atraumatic.   Neck: Neck supple. No JVD present. No thyromegaly present.   Cardiovascular: Normal rate, regular rhythm and normal heart sounds.  Exam reveals no gallop and no friction rub.    No murmur heard.  Pulmonary/Chest: Effort normal and breath sounds normal. No respiratory distress. She has no wheezes. She has no rales.   Abdominal: Soft. Bowel sounds are normal. She exhibits no distension. There is no tenderness. There is no rebound and no guarding.   Musculoskeletal: She exhibits no edema.   Neurological: She is " alert.   Skin: Skin is warm and dry.   Psychiatric: She has a normal mood and affect. Her behavior is normal.   Nursing note and vitals reviewed.      Assessment/Plan   Kesha was seen today for appointment.    Diagnoses and all orders for this visit:    Dyslipidemia    Isolated cervical dystonia    Coronary artery disease involving native coronary artery of native heart without angina pectoris    I d/w lipid results overall excellent, but given CAD hx is not goal and would consider statin therapy.  Patient to see cardiology next week, will take labs and discuss.         -Follow up: 1 year       Current Outpatient Prescriptions:   •  Acetylcarnitine HCl (ACETYL-L-CARNITINE HCL) powder, Take  by mouth., Disp: , Rfl:   •  ascorbic acid (VITAMIN C) 1500 MG tablet controlled-release CR tablet, Take  by mouth., Disp: , Rfl:   •  Coenzyme Q10 60 MG capsule, Take  by mouth., Disp: , Rfl:   •  DIGEST ENZYMES-ANTICHOLINERGIC PO, Take  by mouth., Disp: , Rfl:   •  GLUCOSAMINE HCL PO, Take  by mouth., Disp: , Rfl:   •  GLUTATHIONE PO, Take  by mouth., Disp: , Rfl:   •  ibuprofen (ADVIL,MOTRIN) 600 MG tablet, Take 1 tablet by mouth Every 6 (Six) Hours As Needed for Mild Pain (1-3)., Disp: 360 tablet, Rfl: 1  •  IODINE EX, by Per G Tube route., Disp: , Rfl:   •  MAGNESIUM PO, Take  by mouth., Disp: , Rfl:   •  metoprolol succinate XL (TOPROL-XL) 25 MG 24 hr tablet, TAKE 1 TABLET EVERY DAY, Disp: , Rfl:   •  Multiple Vitamins-Minerals (PX MENS MULTIVITAMINS) tablet, Take  by mouth., Disp: , Rfl:   •  NIACIN PO, Take  by mouth., Disp: , Rfl:   •  onabotulinumtoxina (BOTOX) 100 UNITS reconstituted solution injection, Inject as directed, Disp: , Rfl:   •  SELENIUM PO, Take  by mouth., Disp: , Rfl:

## 2018-08-31 ENCOUNTER — TELEPHONE (OUTPATIENT)
Dept: FAMILY MEDICINE CLINIC | Facility: CLINIC | Age: 73
End: 2018-08-31

## 2018-09-25 ENCOUNTER — TELEPHONE (OUTPATIENT)
Dept: FAMILY MEDICINE CLINIC | Facility: CLINIC | Age: 73
End: 2018-09-25

## 2018-09-26 NOTE — TELEPHONE ENCOUNTER
They have a commercial (prescription only) version called deplin that is 15,000 mcg (15mg) and I prescribe it for certain clinical issues.  I think fine with dose.

## 2018-12-23 ENCOUNTER — HOSPITAL ENCOUNTER (INPATIENT)
Facility: HOSPITAL | Age: 73
LOS: 3 days | Discharge: NURSING, REHAB OR LONG-TERM CARE | End: 2018-12-27
Attending: EMERGENCY MEDICINE | Admitting: INTERNAL MEDICINE

## 2018-12-23 ENCOUNTER — APPOINTMENT (OUTPATIENT)
Dept: GENERAL RADIOLOGY | Facility: HOSPITAL | Age: 73
End: 2018-12-23

## 2018-12-23 DIAGNOSIS — R74.01 ELEVATED TRANSAMINASE LEVEL: ICD-10-CM

## 2018-12-23 DIAGNOSIS — M25.552 LEFT HIP PAIN: ICD-10-CM

## 2018-12-23 DIAGNOSIS — S70.02XA CONTUSION OF LEFT HIP, INITIAL ENCOUNTER: ICD-10-CM

## 2018-12-23 DIAGNOSIS — R26.89 UNABLE TO BEAR WEIGHT: ICD-10-CM
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
## 2018-12-23 PROCEDURE — 72220 X-RAY EXAM SACRUM TAILBONE: CPT

## 2018-12-23 PROCEDURE — 73502 X-RAY EXAM HIP UNI 2-3 VIEWS: CPT

## 2018-12-23 PROCEDURE — 99284 EMERGENCY DEPT VISIT MOD MDM: CPT

## 2018-12-24 ENCOUNTER — APPOINTMENT (OUTPATIENT)
Dept: MRI IMAGING | Facility: HOSPITAL | Age: 73
End: 2018-12-24

## 2018-12-24 ENCOUNTER — APPOINTMENT (OUTPATIENT)
Dept: CT IMAGING | Facility: HOSPITAL | Age: 73
End: 2018-12-24

## 2018-12-24 PROBLEM — K59.00 CONSTIPATION: Status: ACTIVE | Noted: 2018-12-24

## 2018-12-24 PROBLEM — S32.10XA CLOSED FRACTURE OF SACRUM (HCC): Status: ACTIVE | Noted: 2018-12-24

## 2018-12-24 PROBLEM — S32.501A CLOSED NONDISPLACED FRACTURE OF RIGHT PUBIS: Status: ACTIVE | Noted: 2018-12-24

## 2018-12-24 PROBLEM — R79.89 ELEVATED LFTS: Status: ACTIVE | Noted: 2018-12-24
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
## 2018-12-24 LAB
ALBUMIN SERPL-MCNC: 3.6 G/DL (ref 3.5–5.2)
ALBUMIN SERPL-MCNC: 4.2 G/DL (ref 3.5–5.2)
ALBUMIN/GLOB SERPL: 1.5 G/DL
ALBUMIN/GLOB SERPL: 1.8 G/DL
ALP SERPL-CCNC: 56 U/L (ref 39–117)
ALP SERPL-CCNC: 62 U/L (ref 39–117)
ALT SERPL W P-5'-P-CCNC: 40 U/L (ref 1–33)
ALT SERPL W P-5'-P-CCNC: 45 U/L (ref 1–33)
ANION GAP SERPL CALCULATED.3IONS-SCNC: 10.9 MMOL/L
ANION GAP SERPL CALCULATED.3IONS-SCNC: 14.2 MMOL/L
AST SERPL-CCNC: 43 U/L (ref 1–32)
AST SERPL-CCNC: 52 U/L (ref 1–32)
BASOPHILS # BLD AUTO: 0.03 10*3/MM3 (ref 0–0.2)
BASOPHILS NFR BLD AUTO: 0.3 % (ref 0–1.5)
BILIRUB SERPL-MCNC: 0.5 MG/DL (ref 0.1–1.2)
BILIRUB SERPL-MCNC: 0.5 MG/DL (ref 0.1–1.2)
BUN BLD-MCNC: 14 MG/DL (ref 8–23)
BUN BLD-MCNC: 16 MG/DL (ref 8–23)
BUN/CREAT SERPL: 20 (ref 7–25)
BUN/CREAT SERPL: 21.9 (ref 7–25)
CALCIUM SPEC-SCNC: 8.7 MG/DL (ref 8.6–10.5)
CALCIUM SPEC-SCNC: 9.3 MG/DL (ref 8.6–10.5)
CHLORIDE SERPL-SCNC: 104 MMOL/L (ref 98–107)
CHLORIDE SERPL-SCNC: 105 MMOL/L (ref 98–107)
CO2 SERPL-SCNC: 22.8 MMOL/L (ref 22–29)
CO2 SERPL-SCNC: 26.1 MMOL/L (ref 22–29)
CREAT BLD-MCNC: 0.7 MG/DL (ref 0.57–1)
CREAT BLD-MCNC: 0.73 MG/DL (ref 0.57–1)
DEPRECATED RDW RBC AUTO: 53.4 FL (ref 37–54)
DEPRECATED RDW RBC AUTO: 53.6 FL (ref 37–54)
EOSINOPHIL # BLD AUTO: 0.02 10*3/MM3 (ref 0–0.7)
EOSINOPHIL NFR BLD AUTO: 0.2 % (ref 0.3–6.2)
ERYTHROCYTE [DISTWIDTH] IN BLOOD BY AUTOMATED COUNT: 14.8 % (ref 11.7–13)
ERYTHROCYTE [DISTWIDTH] IN BLOOD BY AUTOMATED COUNT: 14.9 % (ref 11.7–13)
GFR SERPL CREATININE-BSD FRML MDRD: 78 ML/MIN/1.73
GFR SERPL CREATININE-BSD FRML MDRD: 82 ML/MIN/1.73
GLOBULIN UR ELPH-MCNC: 2.4 GM/DL
GLOBULIN UR ELPH-MCNC: 2.4 GM/DL
GLUCOSE BLD-MCNC: 153 MG/DL (ref 65–99)
GLUCOSE BLD-MCNC: 95 MG/DL (ref 65–99)
HCT VFR BLD AUTO: 38.4 % (ref 35.6–45.5)
HCT VFR BLD AUTO: 41.4 % (ref 35.6–45.5)
HGB BLD-MCNC: 12.4 G/DL (ref 11.9–15.5)
HGB BLD-MCNC: 13.4 G/DL (ref 11.9–15.5)
IMM GRANULOCYTES # BLD AUTO: 0.03 10*3/MM3 (ref 0–0.03)
IMM GRANULOCYTES NFR BLD AUTO: 0.3 % (ref 0–0.5)
LYMPHOCYTES # BLD AUTO: 0.97 10*3/MM3 (ref 0.9–4.8)
LYMPHOCYTES NFR BLD AUTO: 8.5 % (ref 19.6–45.3)
MCH RBC QN AUTO: 31.6 PG (ref 26.9–32)
MCH RBC QN AUTO: 31.6 PG (ref 26.9–32)
MCHC RBC AUTO-ENTMCNC: 32.3 G/DL (ref 32.4–36.3)
MCHC RBC AUTO-ENTMCNC: 32.4 G/DL (ref 32.4–36.3)
MCV RBC AUTO: 97.6 FL (ref 80.5–98.2)
MCV RBC AUTO: 97.7 FL (ref 80.5–98.2)
MONOCYTES # BLD AUTO: 0.72 10*3/MM3 (ref 0.2–1.2)
MONOCYTES NFR BLD AUTO: 6.3 % (ref 5–12)
NEUTROPHILS # BLD AUTO: 9.62 10*3/MM3 (ref 1.9–8.1)
NEUTROPHILS NFR BLD AUTO: 84.4 % (ref 42.7–76)
PLATELET # BLD AUTO: 195 10*3/MM3 (ref 140–500)
PLATELET # BLD AUTO: 206 10*3/MM3 (ref 140–500)
PMV BLD AUTO: 10.2 FL (ref 6–12)
PMV BLD AUTO: 9.7 FL (ref 6–12)
POTASSIUM BLD-SCNC: 3.7 MMOL/L (ref 3.5–5.2)
POTASSIUM BLD-SCNC: 3.7 MMOL/L (ref 3.5–5.2)
PROT SERPL-MCNC: 6 G/DL (ref 6–8.5)
PROT SERPL-MCNC: 6.6 G/DL (ref 6–8.5)
RBC # BLD AUTO: 3.93 10*6/MM3 (ref 3.9–5.2)
RBC # BLD AUTO: 4.24 10*6/MM3 (ref 3.9–5.2)
SODIUM BLD-SCNC: 141 MMOL/L (ref 136–145)
SODIUM BLD-SCNC: 142 MMOL/L (ref 136–145)
WBC NRBC COR # BLD: 11.39 10*3/MM3 (ref 4.5–10.7)
WBC NRBC COR # BLD: 9.38 10*3/MM3 (ref 4.5–10.7)

## 2018-12-24 PROCEDURE — 72195 MRI PELVIS W/O DYE: CPT

## 2018-12-24 PROCEDURE — 80053 COMPREHEN METABOLIC PANEL: CPT | Performed by: NURSE PRACTITIONER

## 2018-12-24 PROCEDURE — 85027 COMPLETE CBC AUTOMATED: CPT | Performed by: NURSE PRACTITIONER

## 2018-12-24 PROCEDURE — 72192 CT PELVIS W/O DYE: CPT

## 2018-12-24 PROCEDURE — 85025 COMPLETE CBC W/AUTO DIFF WBC: CPT | Performed by: PHYSICIAN ASSISTANT

## 2018-12-24 PROCEDURE — 36415 COLL VENOUS BLD VENIPUNCTURE: CPT | Performed by: NURSE PRACTITIONER

## 2018-12-24 PROCEDURE — 80053 COMPREHEN METABOLIC PANEL: CPT | Performed by: PHYSICIAN ASSISTANT

## 2018-12-24 RX ORDER — ONDANSETRON 2 MG/ML
4 INJECTION INTRAMUSCULAR; INTRAVENOUS EVERY 6 HOURS PRN
Status: DISCONTINUED | OUTPATIENT
Start: 2018-12-24 | End: 2018-12-27 | Stop reason: HOSPADM

## 2018-12-24 RX ORDER — SODIUM CHLORIDE 0.9 % (FLUSH) 0.9 %
1-10 SYRINGE (ML) INJECTION AS NEEDED
Status: DISCONTINUED | OUTPATIENT
Start: 2018-12-24 | End: 2018-12-27 | Stop reason: HOSPADM

## 2018-12-24 RX ORDER — SODIUM CHLORIDE 0.9 % (FLUSH) 0.9 %
3 SYRINGE (ML) INJECTION EVERY 12 HOURS SCHEDULED
Status: DISCONTINUED | OUTPATIENT
Start: 2018-12-24 | End: 2018-12-27 | Stop reason: HOSPADM

## 2018-12-24 RX ORDER — METOPROLOL SUCCINATE 25 MG/1
25 TABLET, EXTENDED RELEASE ORAL DAILY
Status: DISCONTINUED | OUTPATIENT
Start: 2018-12-24 | End: 2018-12-27 | Stop reason: HOSPADM

## 2018-12-24 RX ORDER — SODIUM CHLORIDE 0.9 % (FLUSH) 0.9 %
10 SYRINGE (ML) INJECTION AS NEEDED
Status: DISCONTINUED | OUTPATIENT
Start: 2018-12-24 | End: 2018-12-27 | Stop reason: HOSPADM

## 2018-12-24 RX ORDER — ONDANSETRON 4 MG/1
4 TABLET, FILM COATED ORAL EVERY 6 HOURS PRN
Status: DISCONTINUED | OUTPATIENT
Start: 2018-12-24 | End: 2018-12-27 | Stop reason: HOSPADM

## 2018-12-24 RX ORDER — ONDANSETRON 4 MG/1
4 TABLET, ORALLY DISINTEGRATING ORAL EVERY 6 HOURS PRN
Status: DISCONTINUED | OUTPATIENT
Start: 2018-12-24 | End: 2018-12-27 | Stop reason: HOSPADM

## 2018-12-24 RX ORDER — ACETAMINOPHEN 325 MG/1
650 TABLET ORAL EVERY 4 HOURS PRN
Status: DISCONTINUED | OUTPATIENT
Start: 2018-12-24 | End: 2018-12-27 | Stop reason: HOSPADM

## 2018-12-24 RX ORDER — SENNA AND DOCUSATE SODIUM 50; 8.6 MG/1; MG/1
2 TABLET, FILM COATED ORAL 2 TIMES DAILY
Status: DISCONTINUED | OUTPATIENT
Start: 2018-12-24 | End: 2018-12-27 | Stop reason: HOSPADM

## 2018-12-24 RX ORDER — HYDROMORPHONE HYDROCHLORIDE 1 MG/ML
0.5 INJECTION, SOLUTION INTRAMUSCULAR; INTRAVENOUS; SUBCUTANEOUS
Status: DISCONTINUED | OUTPATIENT
Start: 2018-12-24 | End: 2018-12-27 | Stop reason: HOSPADM

## 2018-12-24 RX ORDER — HYDROCODONE BITARTRATE AND ACETAMINOPHEN 5; 325 MG/1; MG/1
1 TABLET ORAL EVERY 4 HOURS PRN
Status: DISCONTINUED | OUTPATIENT
Start: 2018-12-24 | End: 2018-12-27 | Stop reason: HOSPADM

## 2018-12-24 RX ORDER — NALOXONE HYDROCHLORIDE 1 MG/ML
0.4 INJECTION INTRAMUSCULAR; INTRAVENOUS; SUBCUTANEOUS
Status: DISCONTINUED | OUTPATIENT
Start: 2018-12-24 | End: 2018-12-27 | Stop reason: HOSPADM

## 2018-12-24 RX ADMIN — SODIUM CHLORIDE 500 ML: 9 INJECTION, SOLUTION INTRAVENOUS at 02:22

## 2018-12-24 RX ADMIN — SODIUM CHLORIDE, PRESERVATIVE FREE 3 ML: 5 INJECTION INTRAVENOUS at 03:47

## 2018-12-24 RX ADMIN — METOPROLOL SUCCINATE 25 MG: 25 TABLET, FILM COATED, EXTENDED RELEASE ORAL at 10:38

## 2018-12-24 RX ADMIN — SODIUM CHLORIDE, PRESERVATIVE FREE 3 ML: 5 INJECTION INTRAVENOUS at 08:00

## 2018-12-24 RX ADMIN — ACETAMINOPHEN 650 MG: 325 TABLET, FILM COATED ORAL at 02:19

## 2018-12-25 LAB
ALBUMIN SERPL-MCNC: 3.1 G/DL (ref 3.5–5.2)
ALBUMIN/GLOB SERPL: 1.3 G/DL
ALP SERPL-CCNC: 48 U/L (ref 39–117)
ALT SERPL W P-5'-P-CCNC: 27 U/L (ref 1–33)
ANION GAP SERPL CALCULATED.3IONS-SCNC: 7.8 MMOL/L
AST SERPL-CCNC: 25 U/L (ref 1–32)
BILIRUB SERPL-MCNC: 0.5 MG/DL (ref 0.1–1.2)
BUN BLD-MCNC: 15 MG/DL (ref 8–23)
BUN/CREAT SERPL: 23.8 (ref 7–25)
CALCIUM SPEC-SCNC: 8.7 MG/DL (ref 8.6–10.5)
CHLORIDE SERPL-SCNC: 108 MMOL/L (ref 98–107)
CO2 SERPL-SCNC: 25.2 MMOL/L (ref 22–29)
CREAT BLD-MCNC: 0.63 MG/DL (ref 0.57–1)
DEPRECATED RDW RBC AUTO: 54.3 FL (ref 37–54)
ERYTHROCYTE [DISTWIDTH] IN BLOOD BY AUTOMATED COUNT: 15 % (ref 11.7–13)
GFR SERPL CREATININE-BSD FRML MDRD: 92 ML/MIN/1.73
GLOBULIN UR ELPH-MCNC: 2.3 GM/DL
GLUCOSE BLD-MCNC: 94 MG/DL (ref 65–99)
HCT VFR BLD AUTO: 39 % (ref 35.6–45.5)
HGB BLD-MCNC: 12.4 G/DL (ref 11.9–15.5)
MCH RBC QN AUTO: 31.2 PG (ref 26.9–32)
MCHC RBC AUTO-ENTMCNC: 31.8 G/DL (ref 32.4–36.3)
MCV RBC AUTO: 98.2 FL (ref 80.5–98.2)
PLATELET # BLD AUTO: 203 10*3/MM3 (ref 140–500)
PMV BLD AUTO: 10.3 FL (ref 6–12)
POTASSIUM BLD-SCNC: 3.7 MMOL/L (ref 3.5–5.2)
PROT SERPL-MCNC: 5.4 G/DL (ref 6–8.5)
RBC # BLD AUTO: 3.97 10*6/MM3 (ref 3.9–5.2)
SODIUM BLD-SCNC: 141 MMOL/L (ref 136–145)
WBC NRBC COR # BLD: 6.87 10*3/MM3 (ref 4.5–10.7)

## 2018-12-25 PROCEDURE — 85027 COMPLETE CBC AUTOMATED: CPT | Performed by: INTERNAL MEDICINE

## 2018-12-25 PROCEDURE — 80053 COMPREHEN METABOLIC PANEL: CPT | Performed by: INTERNAL MEDICINE

## 2018-12-25 RX ADMIN — METOPROLOL SUCCINATE 25 MG: 25 TABLET, FILM COATED, EXTENDED RELEASE ORAL at 08:46

## 2018-12-25 RX ADMIN — SODIUM CHLORIDE, PRESERVATIVE FREE 3 ML: 5 INJECTION INTRAVENOUS at 08:47

## 2018-12-25 RX ADMIN — SENNOSIDES AND DOCUSATE SODIUM 2 TABLET: 8.6; 5 TABLET ORAL at 08:46

## 2018-12-25 RX ADMIN — SODIUM CHLORIDE, PRESERVATIVE FREE 3 ML: 5 INJECTION INTRAVENOUS at 20:20

## 2018-12-26 PROCEDURE — G8978 MOBILITY CURRENT STATUS: HCPCS

## 2018-12-26 PROCEDURE — 97110 THERAPEUTIC EXERCISES: CPT

## 2018-12-26 PROCEDURE — 97161 PT EVAL LOW COMPLEX 20 MIN: CPT

## 2018-12-26 PROCEDURE — G8980 MOBILITY D/C STATUS: HCPCS

## 2018-12-26 PROCEDURE — 97165 OT EVAL LOW COMPLEX 30 MIN: CPT

## 2018-12-26 PROCEDURE — 97535 SELF CARE MNGMENT TRAINING: CPT

## 2018-12-26 PROCEDURE — G8979 MOBILITY GOAL STATUS: HCPCS

## 2018-12-26 RX ADMIN — SENNOSIDES AND DOCUSATE SODIUM 2 TABLET: 8.6; 5 TABLET ORAL at 08:40

## 2018-12-26 RX ADMIN — SODIUM CHLORIDE, PRESERVATIVE FREE 3 ML: 5 INJECTION INTRAVENOUS at 08:43

## 2018-12-26 RX ADMIN — SENNOSIDES AND DOCUSATE SODIUM 2 TABLET: 8.6; 5 TABLET ORAL at 19:45

## 2018-12-26 RX ADMIN — METOPROLOL SUCCINATE 25 MG: 25 TABLET, FILM COATED, EXTENDED RELEASE ORAL at 08:40

## 2018-12-27 VITALS
TEMPERATURE: 97.3 F | SYSTOLIC BLOOD PRESSURE: 100 MMHG | DIASTOLIC BLOOD PRESSURE: 68 MMHG | WEIGHT: 103 LBS | HEART RATE: 89 BPM | HEIGHT: 64 IN | OXYGEN SATURATION: 95 % | BODY MASS INDEX: 17.58 KG/M2 | RESPIRATION RATE: 16 BRPM

## 2018-12-27 PROBLEM — K59.00 CONSTIPATION: Status: RESOLVED | Noted: 2018-12-24 | Resolved: 2018-12-27

## 2018-12-27 RX ORDER — SENNA AND DOCUSATE SODIUM 50; 8.6 MG/1; MG/1
2 TABLET, FILM COATED ORAL 2 TIMES DAILY
Start: 2018-12-27 | End: 2019-02-07

## 2018-12-27 RX ORDER — HYDROCODONE BITARTRATE AND ACETAMINOPHEN 5; 325 MG/1; MG/1
1 TABLET ORAL EVERY 4 HOURS PRN
Qty: 18 TABLET | Refills: 0 | Status: SHIPPED | OUTPATIENT
Start: 2018-12-27 | End: 2019-01-03

## 2018-12-27 RX ADMIN — METOPROLOL SUCCINATE 25 MG: 25 TABLET, FILM COATED, EXTENDED RELEASE ORAL at 09:19

## 2018-12-27 RX ADMIN — SODIUM CHLORIDE, PRESERVATIVE FREE 3 ML: 5 INJECTION INTRAVENOUS at 09:20

## 2018-12-27 RX ADMIN — SENNOSIDES AND DOCUSATE SODIUM 2 TABLET: 8.6; 5 TABLET ORAL at 09:19

## 2018-12-28 ENCOUNTER — EPISODE CHANGES (OUTPATIENT)
Dept: CASE MANAGEMENT | Facility: OTHER | Age: 73
End: 2018-12-28

## 2019-01-21 ENCOUNTER — TELEPHONE (OUTPATIENT)
Dept: FAMILY MEDICINE CLINIC | Facility: CLINIC | Age: 74
End: 2019-01-21

## 2019-01-21 NOTE — TELEPHONE ENCOUNTER
FYI  Patient was released from rehab with orders for home health including OT/PT and nursing. Nursing has evaluated patient and found no need for these orders but OT/PT will see patient this week.

## 2019-02-07 ENCOUNTER — OFFICE VISIT (OUTPATIENT)
Dept: FAMILY MEDICINE CLINIC | Facility: CLINIC | Age: 74
End: 2019-02-07

## 2019-02-07 ENCOUNTER — EPISODE CHANGES (OUTPATIENT)
Dept: CASE MANAGEMENT | Facility: OTHER | Age: 74
End: 2019-02-07

## 2019-02-07 VITALS
SYSTOLIC BLOOD PRESSURE: 94 MMHG | BODY MASS INDEX: 17.93 KG/M2 | OXYGEN SATURATION: 98 % | DIASTOLIC BLOOD PRESSURE: 60 MMHG | HEIGHT: 64 IN | WEIGHT: 105 LBS | HEART RATE: 67 BPM

## 2019-02-07 DIAGNOSIS — Z23 IMMUNIZATION DUE: ICD-10-CM

## 2019-02-07 DIAGNOSIS — S32.10XS CLOSED FRACTURE OF SACRUM, UNSPECIFIED PORTION OF SACRUM, SEQUELA: ICD-10-CM

## 2019-02-07 DIAGNOSIS — S32.501D CLOSED NONDISPLACED FRACTURE OF RIGHT PUBIS WITH ROUTINE HEALING, SUBSEQUENT ENCOUNTER: ICD-10-CM

## 2019-02-07 DIAGNOSIS — Z00.00 MEDICARE ANNUAL WELLNESS VISIT, SUBSEQUENT: Primary | ICD-10-CM

## 2019-02-07 DIAGNOSIS — K56.41 FECAL IMPACTION (HCC): ICD-10-CM

## 2019-02-07 PROCEDURE — G0439 PPPS, SUBSEQ VISIT: HCPCS | Performed by: FAMILY MEDICINE

## 2019-02-07 PROCEDURE — 99213 OFFICE O/P EST LOW 20 MIN: CPT | Performed by: FAMILY MEDICINE

## 2019-02-07 RX ORDER — METOPROLOL SUCCINATE 25 MG/1
25 TABLET, EXTENDED RELEASE ORAL 2 TIMES DAILY
COMMUNITY
Start: 2019-01-24 | End: 2021-11-05 | Stop reason: DRUGHIGH

## 2019-02-07 NOTE — PROGRESS NOTES
QUICK REFERENCE INFORMATION:  The ABCs of the Annual Wellness Visit    Subsequent Medicare Wellness Visit    HEALTH RISK ASSESSMENT    1945    Recent Hospitalizations:  Recently treated at the following:  [facility].    The patient is a 73 y.o. female who presented with left hip and lower back pain after sustaining a mechanical fall onto her driveway while she was taking out her garbage.  Please see admission H&P from 12/24/18 for further details.  She was found to have right pubis and left sacral fractures, the latter involving an incidentally discovered perineural cyst.  She was started on pain control.  Dr. Holden with orthopedics evaluated her and recommended conservative management with physical therapy and a follow up visit with him in 1 month.  She did well with PT and her pain was adequately controlled with oral pain medications.  She will be discharged to rehab to work on strengthening which she will need before she returns home as she does live alone and has a tri-level house.    Of note, she was found to have extensive constipation and diverticulosis on abdominal imaging.  She notes that she normally has diarrhea, which is likely due to decreased absorption from constipation. She will need to continue her bowel regimen.  She has also been on a large amount of supplements, most of which I have stopped for the time being.      Current Medical Providers:  Patient Care Team:  Felipe Owens DO as PCP - Dino Esparza MD as PCP - Howie Stephenson, RN as Care Coordinator (Population Health)        Smoking Status:  Social History     Tobacco Use   Smoking Status Former Smoker   Smokeless Tobacco Never Used       Alcohol Consumption:  Social History     Substance and Sexual Activity   Alcohol Use No       Depression Screen:   PHQ-2/PHQ-9 Depression Screening 2/7/2019   Little interest or pleasure in doing things 0   Feeling down, depressed, or hopeless 0    Trouble falling or staying asleep, or sleeping too much 0   Feeling tired or having little energy 1   Poor appetite or overeating 0   Feeling bad about yourself - or that you are a failure or have let yourself or your family down 0   Trouble concentrating on things, such as reading the newspaper or watching television 3   Moving or speaking so slowly that other people could have noticed. Or the opposite - being so fidgety or restless that you have been moving around a lot more than usual 0   Thoughts that you would be better off dead, or of hurting yourself in some way 0   Total Score 4   If you checked off any problems, how difficult have these problems made it for you to do your work, take care of things at home, or get along with other people? Not difficult at all       Health Habits and Functional and Cognitive Screening:  Functional & Cognitive Status 2/7/2019   Do you have difficulty preparing food and eating? No   Do you have difficulty bathing yourself, getting dressed or grooming yourself? No   Do you have difficulty using the toilet? No   Do you have difficulty moving around from place to place? No   Do you have trouble with steps or getting out of a bed or a chair? No   In the past year have you fallen or experienced a near fall? Yes   Current Diet Well Balanced Diet   Dental Exam Up to date   Eye Exam Not up to date   Exercise (times per week) 5 times per week   Current Exercise Activities Include Cardiovasular Workout on Exercise Equipment   Do you need help using the phone?  No   Are you deaf or do you have serious difficulty hearing?  No   Do you need help with transportation? No   Do you need help shopping? No   Do you need help preparing meals?  No   Do you need help with housework?  No   Do you need help with laundry? No   Do you need help taking your medications? No   Do you need help managing money? No   Do you ever drive or ride in a car without wearing a seat belt? No   Have you felt unusual  stress, anger or loneliness in the last month? No   Who do you live with? Alone   If you need help, do you have trouble finding someone available to you? No   Have you been bothered in the last four weeks by sexual problems? No   Do you have difficulty concentrating, remembering or making decisions? Yes           Does the patient have evidence of cognitive impairment? No    Aspirin use counseling: Does not need ASA (and currently is not on it)      Recent Lab Results:  CMP:  Lab Results   Component Value Date    GLU 75 03/29/2018    BUN 15 12/25/2018    CREATININE 0.63 12/25/2018    EGFRIFNONA 92 12/25/2018    EGFRIFAFRI 89 03/29/2018    BCR 23.8 12/25/2018     12/25/2018    K 3.7 12/25/2018    CO2 25.2 12/25/2018    CALCIUM 8.7 12/25/2018    PROTENTOTREF 6.4 03/29/2018    ALBUMIN 3.10 (L) 12/25/2018    LABGLOBREF 1.8 03/29/2018    LABIL2 2.6 03/29/2018    BILITOT 0.5 12/25/2018    ALKPHOS 48 12/25/2018    AST 25 12/25/2018    ALT 27 12/25/2018     Lipid Panel:  Lab Results   Component Value Date    TRIG 58 03/29/2018    HDL 86 (H) 03/29/2018    VLDL 11.6 03/29/2018     HbA1c:       Visual Acuity:  No exam data present    Age-appropriate Screening Schedule:  Refer to the list below for future screening recommendations based on patient's age, sex and/or medical conditions. Orders for these recommended tests are listed in the plan section. The patient has been provided with a written plan.    Health Maintenance   Topic Date Due   • DXA SCAN  11/01/2016   • ZOSTER VACCINE (2 of 2) 12/27/2016   • MAMMOGRAM  11/01/2018   • COLONOSCOPY  04/24/2021   • TDAP/TD VACCINES (2 - Td) 11/01/2026   • INFLUENZA VACCINE  Addressed   • PNEUMOCOCCAL VACCINES (65+ LOW/MEDIUM RISK)  Addressed        Subjective   History of Present Illness    Kesha hCa is a 73 y.o. female who presents for an Subsequent Wellness Visit.    The following portions of the patient's history were reviewed and updated as appropriate: allergies,  current medications, past family history, past medical history, past social history, past surgical history and problem list.    Outpatient Medications Prior to Visit   Medication Sig Dispense Refill   • ascorbic acid (VITAMIN C) 1500 MG tablet controlled-release CR tablet Take  by mouth.     • metoprolol succinate XL (TOPROL-XL) 25 MG 24 hr tablet Take 25 mg by mouth 2 (Two) Times a Day.     • Multiple Vitamins-Minerals (PX MENS MULTIVITAMINS) tablet Take  by mouth.     • NIACIN PO Take  by mouth.     • onabotulinumtoxina (BOTOX) 100 UNITS reconstituted solution injection Inject as directed     • metoprolol succinate XL (TOPROL-XL) 25 MG 24 hr tablet TAKE 1 TABLET EVERY DAY     • polyethylene glycol (MIRALAX) pack packet Take 17 g by mouth Daily.     • sennosides-docusate sodium (SENOKOT-S) 8.6-50 MG tablet Take 2 tablets by mouth 2 (Two) Times a Day.       No facility-administered medications prior to visit.        Patient Active Problem List   Diagnosis   • Atopic rhinitis   • Acute otitis media   • Isolated cervical dystonia   • Radial styloid tenosynovitis   • Degeneration of intervertebral disc of lumbar region   • Disorder of jaw   • Diverticulosis of intestine   • Dyslipidemia   • Dysphagia   • Idiopathic torsion dystonia   • Post-menopausal osteoporosis   • Osteoporosis   • Palpitations   • Reactive airway disease   • Scoliosis   • Arthralgia of temporomandibular joint   • Upper respiratory tract infection   • Atypical chest pain   • Chest pain   • Dystonia   • Fall   • Closed nondisplaced fracture of right pubis (CMS/HCC)   • Closed fracture of sacrum (CMS/HCC)   • Elevated LFTs       Advance Care Planning:  has NO advance directive - information provided to the patient today    Identification of Risk Factors:  Risk factors include: fall risk.    Review of Systems   Musculoskeletal: Positive for back pain.   Neurological: Negative for tingling and numbness.       Compared to one year ago, the patient feels  "her physical health is the same.  Compared to one year ago, the patient feels her mental health is the same.    Objective     Physical Exam   Constitutional: She appears well-developed and well-nourished.   HENT:   Head: Normocephalic and atraumatic.   Neck: Neck supple. No JVD present. No thyromegaly present.   Cardiovascular: Normal rate, regular rhythm and normal heart sounds. Exam reveals no gallop and no friction rub.   No murmur heard.  Pulmonary/Chest: Effort normal and breath sounds normal. No respiratory distress. She has no wheezes. She has no rales.   Abdominal: Soft. Bowel sounds are normal. She exhibits no distension. There is no tenderness. There is no rebound and no guarding.   Musculoskeletal: She exhibits no edema.   Neurological: She is alert.   Skin: Skin is warm and dry.   Psychiatric: She has a normal mood and affect. Her behavior is normal.   Nursing note and vitals reviewed.      Vitals:    02/07/19 1302   BP: 94/60   BP Location: Left arm   Patient Position: Sitting   Cuff Size: Adult   Pulse: 67   SpO2: 98%   Weight: 47.6 kg (105 lb)   Height: 162.6 cm (64\")   PainSc: 0-No pain       Patient's Body mass index is 18.02 kg/m². BMI is within normal parameters. No follow-up required..      Assessment/Plan   Patient Self-Management and Personalized Health Advice  The patient has been provided with information about: fall prevention and preventive services including:   · Advance directive.    Visit Diagnoses:    ICD-10-CM ICD-9-CM   1. Medicare annual wellness visit, subsequent Z00.00 V70.0   2. Closed fracture of sacrum, unspecified portion of sacrum, sequela S32.10XS 905.1   3. Closed nondisplaced fracture of right pubis with routine healing, subsequent encounter S32.501D V54.19   4. Fecal impaction (CMS/[facility]) K56.41 560.32   5. Immunization due Z23 V05.9       No orders of the defined types were placed in this encounter.      Outpatient Encounter Medications as of 2/7/2019   Medication Sig " Dispense Refill   • ascorbic acid (VITAMIN C) 1500 MG tablet controlled-release CR tablet Take  by mouth.     • metoprolol succinate XL (TOPROL-XL) 25 MG 24 hr tablet Take 25 mg by mouth 2 (Two) Times a Day.     • Multiple Vitamins-Minerals (PX MENS MULTIVITAMINS) tablet Take  by mouth.     • NIACIN PO Take  by mouth.     • onabotulinumtoxina (BOTOX) 100 UNITS reconstituted solution injection Inject as directed     • magnesium citrate solution 148 ml (1/2 bottle) po evening, then repeat next am 148 ml (1/2 bottle). 196 mL 0   • psyllium (METAMUCIL MULTIHEALTH FIBER) 58.12 % packet 1 packet twice daily (start 24 hours after mag citrate). 60 packet 12   • [DISCONTINUED] metoprolol succinate XL (TOPROL-XL) 25 MG 24 hr tablet TAKE 1 TABLET EVERY DAY     • [DISCONTINUED] polyethylene glycol (MIRALAX) pack packet Take 17 g by mouth Daily.     • [DISCONTINUED] sennosides-docusate sodium (SENOKOT-S) 8.6-50 MG tablet Take 2 tablets by mouth 2 (Two) Times a Day.     • [] pneumococcal conj. 13-valent (PREVNAR-13) vaccine 0.5 mL        No facility-administered encounter medications on file as of 2019.        Reviewed use of high risk medication in the elderly: yes  Reviewed for potential of harmful drug interactions in the elderly: yes    Follow Up:  No Follow-up on file.     An After Visit Summary and PPPS with all of these plans were given to the patient.           ++++++++++++++++++++++++++++++++++++++++++++++++++++++++++++++++++     CC: hospital f/u    Patient lives in trilevel home and fell while at home, she sustained sacral and pubic ramus fx;  Tx conservative measures and sent to rehab;  Now [facility] with PT;  Doing well.    Back Pain   This is a new problem. The current episode started more than 1 month ago. The problem occurs intermittently. The problem has been gradually improving since onset. The pain is present in the sacro-iliac. The quality of the pain is described as aching. The pain does not radiate. Pain  "severity now: mild to moderate. The symptoms are aggravated by sitting and bending. Pertinent negatives include no leg pain, numbness or tingling. She has tried home exercises and analgesics (continuing therapy but about to finish.) for the symptoms. The treatment provided moderate relief.       Review of Systems   Musculoskeletal: Positive for back pain.   Neurological: Negative for numbness and tingling.       Social History     Tobacco Use   • Smoking status: Former Smoker   • Smokeless tobacco: Never Used   Substance Use Topics   • Alcohol use: No     O:   Vitals:    02/07/19 1302   BP: 94/60   BP Location: Left arm   Patient Position: Sitting   Cuff Size: Adult   Pulse: 67   SpO2: 98%   Weight: 47.6 kg (105 lb)   Height: 162.6 cm (64\")   PainSc: 0-No pain       Physical Exam   Constitutional: She appears well-developed and well-nourished.   HENT:   Head: Normocephalic and atraumatic.   Neck: Neck supple. No JVD present. No thyromegaly present.   Cardiovascular: Normal rate, regular rhythm and normal heart sounds. Exam reveals no gallop and no friction rub.   No murmur heard.  Pulmonary/Chest: Effort normal and breath sounds normal. No respiratory distress. She has no wheezes. She has no rales.   Abdominal: Soft. Bowel sounds are normal. She exhibits no distension. There is no tenderness. There is no rebound and no guarding.   Musculoskeletal: She exhibits no edema.   Neurological: She is alert.   Skin: Skin is warm and dry.   Psychiatric: She has a normal mood and affect. Her behavior is normal.   Nursing note and vitals reviewed.      CT Pelvis:  IMPRESSION:  1. No acute osseous finding.  2. Severe constipation, diverticulosis     This report was finalized on 12/24/2018 1:02 AM by Antolin Lozano M.D.  MRI Pelvis  IMPRESSION:  1. Acute nondisplaced fractures left sacral parker and right pubic  symphysis.  2. Chronic S2 sacral perineural cysts. There is a hematocrit level  within the left S2 cyst due to extension " of fracture into the cyst. Mild  free fluid within the pelvis.  3. Diffuse sigmoid diverticulosis.     This report was finalized on 12/24/2018 12:42 PM by Dr. Julien Duke M.D.  Kesha was seen today for annual exam and fall.    Diagnoses and all orders for this visit:    Medicare annual wellness visit, subsequent    Closed fracture of sacrum, unspecified portion of sacrum, sequela    Closed nondisplaced fracture of right pubis with routine healing, subsequent encounter    Fecal impaction (CMS/[facility])  -     magnesium citrate solution; 148 ml (1/2 bottle) po evening, then repeat next am 148 ml (1/2 bottle).  -     psyllium (METAMUCIL MULTIHEALTH FIBER) 58.12 % packet; 1 packet twice daily (start 24 hours after mag citrate).    Immunization due  -     pneumococcal conj. 13-valent (PREVNAR-13) vaccine 0.5 mL; Inject 0.5 mL into the appropriate muscle as directed by prescriber 1 (One) Time.    -d/w fecal impaction at length and pathophysiology;  Stop all anti-motility agents and will try mag citrate, then initiate metamucil as stool buling agent bid for couple months then daily to maintain normal BM  -f/u with ortho as planned      No Follow-up on file.

## 2019-02-08 ENCOUNTER — TELEPHONE (OUTPATIENT)
Dept: FAMILY MEDICINE CLINIC | Facility: CLINIC | Age: 74
End: 2019-02-08

## 2019-02-14 ENCOUNTER — PATIENT OUTREACH (OUTPATIENT)
Dept: CASE MANAGEMENT | Facility: OTHER | Age: 74
End: 2019-02-14

## 2019-02-14 NOTE — OUTREACH NOTE
Care Management Plan 2/14/2019   Lifestyle Goals Eat a healthy diet;Exercise a number of times per week   Barriers Other (See Comment)   Barriers need to establish an exercise routine   Self Management Educational materials provided   Annual Wellness Visit:  Patient Has Completed   Care Gaps Addressed Mammogram   Care Gaps Addressed age 73, pt not interested in routine mammograms   Specific Disease Process Teaching Hypertension   Does patient have depression diagnosis? No   Advanced Directives: Not Interested At This Time   Ed Visits past 12 months: None   Hospitalizations past 12 months 1   Hospitalizations past 12 months 12/23-12/27/18 pelvic fx, went to [facility] for skilled therapy, then home 1/18/19. [facility] completed.    Medication Adherence Medications understood   Goal Progress Goal(s) Met   Goal Progress Pelvic fracture healed, no pain, resuming physical activities, and continue exercises.     The main concerns and/or symptoms the patient would like to address are: see above notes. Spoke with Ms Cha for HRCM call.  States she's feeling much better since pain-free and meeting therapy goals, gradually resuming most ADL activities except not driving for another month. Pt spoke highly of the home health staff and visits ended last week. The SNF arranged home lunch meal delivery Monday thru Friday to minimize cooking. Her friend has helped with grocery shopping and rides to medical appointments. She eats a regular diet, but cuts back on salty food when ankles start to swell.     Education/instruction provided by Care Coordinator:  Informed of care advisor role, contact number. Discussed eating a healthy heart diet, educated on reading food labels with attention to sodium content. Encouraged to limit processed foods and can goods unless low in sodium, gave examples of substitution. Suggested pt talk with PCP about mammograms.  Educated on advance directives, has information. Declines MyChart. Pt voiced no  further needs at present.     Follow Up Outreach Due:  As needed    Howie Leonardo RN

## 2019-02-19 ENCOUNTER — EPISODE CHANGES (OUTPATIENT)
Dept: CASE MANAGEMENT | Facility: OTHER | Age: 74
End: 2019-02-19

## 2019-03-02 ENCOUNTER — EPISODE CHANGES (OUTPATIENT)
Dept: CASE MANAGEMENT | Facility: OTHER | Age: 74
End: 2019-03-02

## 2019-05-07 ENCOUNTER — OFFICE VISIT (OUTPATIENT)
Dept: FAMILY MEDICINE CLINIC | Facility: CLINIC | Age: 74
End: 2019-05-07

## 2019-05-07 VITALS
OXYGEN SATURATION: 97 % | HEART RATE: 60 BPM | WEIGHT: 111 LBS | BODY MASS INDEX: 19.05 KG/M2 | TEMPERATURE: 97.7 F | SYSTOLIC BLOOD PRESSURE: 104 MMHG | DIASTOLIC BLOOD PRESSURE: 60 MMHG

## 2019-05-07 DIAGNOSIS — Z12.31 ENCOUNTER FOR SCREENING MAMMOGRAM FOR MALIGNANT NEOPLASM OF BREAST: ICD-10-CM

## 2019-05-07 DIAGNOSIS — S32.501D CLOSED NONDISPLACED FRACTURE OF RIGHT PUBIS WITH ROUTINE HEALING, SUBSEQUENT ENCOUNTER: ICD-10-CM

## 2019-05-07 DIAGNOSIS — M81.0 POST-MENOPAUSAL OSTEOPOROSIS: Primary | ICD-10-CM

## 2019-05-07 DIAGNOSIS — S32.10XS CLOSED FRACTURE OF SACRUM, UNSPECIFIED PORTION OF SACRUM, SEQUELA: ICD-10-CM

## 2019-05-07 PROBLEM — R06.02 SOB (SHORTNESS OF BREATH): Status: ACTIVE | Noted: 2019-01-24

## 2019-05-07 PROCEDURE — 99213 OFFICE O/P EST LOW 20 MIN: CPT | Performed by: FAMILY MEDICINE

## 2019-05-07 NOTE — PROGRESS NOTES
Subjective   Kesha Cha is a 73 y.o. female. Presents today for   Chief Complaint   Patient presents with   • Follow-up     med check and needs mammo  at [facility].        History of Present Illness  Patient s/p fall with sacral fracture, healing well and can now sit for longer periods;  Still some pain;  No further falls or injuries;  No bladder or bowel incontinence.  No numbness or weakness;  SInce fragility fracture and 3 months out we discussed starting medication to improve bone density and future fractures;   We discussed several options oral and injectable.    Review of Systems   Musculoskeletal: Positive for back pain and gait problem. Negative for myalgias.   Neurological: Negative for syncope, weakness and numbness.       Patient Active Problem List   Diagnosis   • Atopic rhinitis   • Acute otitis media   • Isolated cervical dystonia   • Radial styloid tenosynovitis   • Degeneration of intervertebral disc of lumbar region   • Disorder of jaw   • Diverticulosis of intestine   • Dyslipidemia   • Dysphagia   • Idiopathic torsion dystonia   • Post-menopausal osteoporosis   • Osteoporosis   • Palpitations   • Reactive airway disease   • Scoliosis   • Arthralgia of temporomandibular joint   • Upper respiratory tract infection   • Atypical chest pain   • Chest pain   • Dystonia   • Fall   • Closed nondisplaced fracture of right pubis (CMS/HCC)   • Closed fracture of sacrum (CMS/HCC)   • Elevated LFTs   • SOB (shortness of breath)       Social History     Socioeconomic History   • Marital status:      Spouse name: Not on file   • Number of children: Not on file   • Years of education: Not on file   • Highest education level: Not on file   Tobacco Use   • Smoking status: Former Smoker   • Smokeless tobacco: Never Used   Substance and Sexual Activity   • Alcohol use: No   • Drug use: No       No Known Allergies    Current Outpatient Medications on File Prior to Visit   Medication Sig Dispense Refill    • ascorbic acid (VITAMIN C) 1500 MG tablet controlled-release CR tablet Take  by mouth.     • magnesium citrate solution 148 ml (1/2 bottle) po evening, then repeat next am 148 ml (1/2 bottle). 196 mL 0   • metoprolol succinate XL (TOPROL-XL) 25 MG 24 hr tablet Take 25 mg by mouth 2 (Two) Times a Day.     • Multiple Vitamins-Minerals (PX MENS MULTIVITAMINS) tablet Take  by mouth.     • NIACIN PO Take  by mouth.     • onabotulinumtoxina (BOTOX) 100 UNITS reconstituted solution injection Inject as directed     • psyllium (METAMUCIL MULTIHEALTH FIBER) 58.12 % packet 1 packet twice daily (start 24 hours after mag citrate). 60 packet 12     No current facility-administered medications on file prior to visit.        Objective   Vitals:    05/07/19 1258   BP: 104/60   Pulse: 60   Temp: 97.7 °F (36.5 °C)   SpO2: 97%   Weight: 50.3 kg (111 lb)       Physical Exam   Constitutional: She is oriented to person, place, and time. She appears well-developed and well-nourished.   Neurological: She is alert and oriented to person, place, and time.   Skin: Skin is warm and dry.   Psychiatric: She has a normal mood and affect. Her behavior is normal.   Nursing note and vitals reviewed.      Assessment/Plan   Kesha was seen today for follow-up.    Diagnoses and all orders for this visit:    Post-menopausal osteoporosis  -     denosumab (PROLIA) syringe 60 mg  -     Calcium  -     Ambulatory Referral to [facility] For Infusion Treatment  -     DEXA Bone Density Axial; Future  -     Mammo Screening Bilateral With CAD; Future    Closed fracture of sacrum, unspecified portion of sacrum, sequela    Closed nondisplaced fracture of right pubis with routine healing, subsequent encounter    Encounter for screening mammogram for malignant neoplasm of breast   -     Mammo Screening Bilateral With CAD; Future    -due for breast cancer screening  -will check dexa to obtain baseline, but given fragility fx will initiate prolia, far enough out from  fracture to start.  We discussed risks/benefits at length, rare side effects and importance of preventing future fractures.         -Follow up: 6 months and prn

## 2019-05-08 ENCOUNTER — TELEPHONE (OUTPATIENT)
Dept: FAMILY MEDICINE CLINIC | Facility: CLINIC | Age: 74
End: 2019-05-08

## 2019-05-08 DIAGNOSIS — M81.0 POST-MENOPAUSAL OSTEOPOROSIS: Primary | ICD-10-CM

## 2019-05-15 ENCOUNTER — TELEPHONE (OUTPATIENT)
Dept: FAMILY MEDICINE CLINIC | Facility: CLINIC | Age: 74
End: 2019-05-15

## 2019-05-16 ENCOUNTER — TELEPHONE (OUTPATIENT)
Dept: FAMILY MEDICINE CLINIC | Facility: CLINIC | Age: 74
End: 2019-05-16

## 2019-05-16 DIAGNOSIS — M81.0 POST-MENOPAUSAL OSTEOPOROSIS: Primary | ICD-10-CM

## 2019-05-20 ENCOUNTER — TELEPHONE (OUTPATIENT)
Dept: FAMILY MEDICINE CLINIC | Facility: CLINIC | Age: 74
End: 2019-05-20

## 2019-05-20 DIAGNOSIS — M81.0 POST-MENOPAUSAL OSTEOPOROSIS: Primary | ICD-10-CM

## 2019-05-24 ENCOUNTER — TELEPHONE (OUTPATIENT)
Dept: FAMILY MEDICINE CLINIC | Facility: CLINIC | Age: 74
End: 2019-05-24

## 2019-05-28 NOTE — TELEPHONE ENCOUNTER
Supplement industry is unregulated so can make any claim they want, only obligation is to list in fine print not been evaluated by FDA - this means no real studies.  Usually when they say clinically proven means they surveyed a few people to see if felt better.  I haven't heard of this specific one, but not seen anybody really have improvement taking supplements.  The best thing for memory is diet high in omega 3 fatty acids, fresh fruits and vegetables, lean meats only and regular exercise.  RRJ

## 2019-11-19 ENCOUNTER — TELEPHONE (OUTPATIENT)
Dept: FAMILY MEDICINE CLINIC | Facility: CLINIC | Age: 74
End: 2019-11-19

## 2019-11-19 NOTE — TELEPHONE ENCOUNTER
Pt wants to know what she can take for memory loss and what are the side effects. Please advise what to tell pt.

## 2019-11-19 NOTE — TELEPHONE ENCOUNTER
Best thing for memory is regular exercise and healthy diet high in omega 3 fatty acids.  Can also try taking b complex.  I would do formal testing for memory prior to taking any medications because they have very modest benefit with a fair amoutn of side effect.s

## 2019-12-31 ENCOUNTER — TELEPHONE (OUTPATIENT)
Dept: FAMILY MEDICINE CLINIC | Facility: CLINIC | Age: 74
End: 2019-12-31

## 2019-12-31 DIAGNOSIS — R41.3 MEMORY LOSS: Primary | ICD-10-CM

## 2020-01-27 ENCOUNTER — TELEPHONE (OUTPATIENT)
Dept: FAMILY MEDICINE CLINIC | Facility: CLINIC | Age: 75
End: 2020-01-27

## 2020-01-27 DIAGNOSIS — F09 MILD COGNITIVE DISORDER: Primary | ICD-10-CM

## 2020-01-27 RX ORDER — DONEPEZIL HYDROCHLORIDE 5 MG/1
5 TABLET, FILM COATED ORAL NIGHTLY
Qty: 30 TABLET | Refills: 5 | Status: SHIPPED | OUTPATIENT
Start: 2020-01-27 | End: 2020-07-20 | Stop reason: DRUGHIGH

## 2020-01-27 NOTE — TELEPHONE ENCOUNTER
She had mild cognitive decline on neuropsych evaluation, I just sent in aricept;  Have f/u with me in May.  RRJ

## 2020-05-08 ENCOUNTER — OFFICE VISIT (OUTPATIENT)
Dept: FAMILY MEDICINE CLINIC | Facility: CLINIC | Age: 75
End: 2020-05-08

## 2020-05-08 VITALS
BODY MASS INDEX: 17.85 KG/M2 | OXYGEN SATURATION: 97 % | HEART RATE: 68 BPM | SYSTOLIC BLOOD PRESSURE: 102 MMHG | WEIGHT: 97 LBS | HEIGHT: 62 IN | DIASTOLIC BLOOD PRESSURE: 70 MMHG

## 2020-05-08 DIAGNOSIS — R19.7 DIARRHEA, UNSPECIFIED TYPE: ICD-10-CM

## 2020-05-08 DIAGNOSIS — Z00.00 MEDICARE ANNUAL WELLNESS VISIT, SUBSEQUENT: Primary | ICD-10-CM

## 2020-05-08 DIAGNOSIS — K58.0 IRRITABLE BOWEL SYNDROME WITH DIARRHEA: ICD-10-CM

## 2020-05-08 DIAGNOSIS — Z91.09 OTHER ALLERGY STATUS, OTHER THAN TO DRUGS AND BIOLOGICAL SUBSTANCES: ICD-10-CM

## 2020-05-08 DIAGNOSIS — R10.13 EPIGASTRIC ABDOMINAL PAIN: ICD-10-CM

## 2020-05-08 PROBLEM — I25.10 CORONARY ARTERY DISEASE: Status: ACTIVE | Noted: 2020-05-08

## 2020-05-08 PROBLEM — I49.3 PVC'S (PREMATURE VENTRICULAR CONTRACTIONS): Status: ACTIVE | Noted: 2020-05-08

## 2020-05-08 PROBLEM — R93.89 ABNORMAL CT OF THE CHEST: Status: ACTIVE | Noted: 2019-09-17

## 2020-05-08 PROBLEM — E78.5 HYPERLIPIDEMIA: Status: ACTIVE | Noted: 2020-05-08

## 2020-05-08 PROCEDURE — 99214 OFFICE O/P EST MOD 30 MIN: CPT | Performed by: FAMILY MEDICINE

## 2020-05-08 PROCEDURE — 36415 COLL VENOUS BLD VENIPUNCTURE: CPT | Performed by: FAMILY MEDICINE

## 2020-05-08 PROCEDURE — G0439 PPPS, SUBSEQ VISIT: HCPCS | Performed by: FAMILY MEDICINE

## 2020-05-08 RX ORDER — MONTELUKAST SODIUM 4 MG/1
2 TABLET, CHEWABLE ORAL 2 TIMES DAILY
Qty: 120 TABLET | Refills: 5 | Status: SHIPPED | OUTPATIENT
Start: 2020-05-08 | End: 2020-11-09

## 2020-05-08 NOTE — PROGRESS NOTES
QUICK REFERENCE INFORMATION:  The ABCs of the Annual Wellness Visit    Subsequent Medicare Wellness Visit    HEALTH RISK ASSESSMENT    1945    Recent Hospitalizations:  No hospitalization(s) within the last year..        Current Medical Providers:  Patient Care Team:  Felipe Owens DO as PCP - General  Benson Chapman APRN as PCP - Claims Attributed        Smoking Status:  Social History     Tobacco Use   Smoking Status Former Smoker   • Types: Cigarettes   • Start date: 1958   • Last attempt to quit: 1969   • Years since quittin.3   Smokeless Tobacco Never Used       Alcohol Consumption:  Social History     Substance and Sexual Activity   Alcohol Use No       Depression Screen:   PHQ-2/PHQ-9 Depression Screening 2020   Little interest or pleasure in doing things 0   Feeling down, depressed, or hopeless 0   Trouble falling or staying asleep, or sleeping too much 0   Feeling tired or having little energy 0   Poor appetite or overeating 0   Feeling bad about yourself - or that you are a failure or have let yourself or your family down 0   Trouble concentrating on things, such as reading the newspaper or watching television 0   Moving or speaking so slowly that other people could have noticed. Or the opposite - being so fidgety or restless that you have been moving around a lot more than usual 0   Thoughts that you would be better off dead, or of hurting yourself in some way 0   Total Score 0   If you checked off any problems, how difficult have these problems made it for you to do your work, take care of things at home, or get along with other people? Not difficult at all       Health Habits and Functional and Cognitive Screening:  Functional & Cognitive Status 2020   Do you have difficulty preparing food and eating? No   Do you have difficulty bathing yourself, getting dressed or grooming yourself? No   Do you have difficulty using the toilet? No   Do you have difficulty moving  around from place to place? No   Do you have trouble with steps or getting out of a bed or a chair? No   Current Diet Well Balanced Diet   Dental Exam Up to date   Eye Exam Not up to date   Exercise (times per week) 1 times per week   Current Exercise Activities Include Walking   Do you need help using the phone?  No   Are you deaf or do you have serious difficulty hearing?  No   Do you need help with transportation? No   Do you need help shopping? No   Do you need help preparing meals?  No   Do you need help with housework?  No   Do you need help with laundry? No   Do you need help taking your medications? No   Do you need help managing money? No   Do you ever drive or ride in a car without wearing a seat belt? No   Have you felt unusual stress, anger or loneliness in the last month? No   Who do you live with? Alone   If you need help, do you have trouble finding someone available to you? No   Have you been bothered in the last four weeks by sexual problems? No   Do you have difficulty concentrating, remembering or making decisions? No           Does the patient have evidence of cognitive impairment? No    Aspirin use counseling: Does not need ASA (and currently is not on it)      Recent Lab Results:  CMP:  Lab Results   Component Value Date     (H) 05/08/2020    BUN 11 05/08/2020    CREATININE 0.71 05/08/2020    EGFRIFNONA 83 05/08/2020    EGFRIFAFRI 96 05/08/2020    BCR 15 05/08/2020     05/08/2020    K 3.8 05/08/2020    CO2 27 05/08/2020    CALCIUM 9.2 05/08/2020    PROTENTOTREF 6.2 05/08/2020    ALBUMIN 4.2 05/08/2020    LABGLOBREF 2.0 05/08/2020    LABIL2 2.1 05/08/2020    BILITOT 0.2 05/08/2020    ALKPHOS 63 05/08/2020    AST 32 05/08/2020    ALT 22 05/08/2020     Lipid Panel:  Lab Results   Component Value Date    TRIG 58 03/29/2018    HDL 86 (H) 03/29/2018    VLDL 11.6 03/29/2018     HbA1c:       Visual Acuity:  No exam data present    Age-appropriate Screening Schedule:  Refer to the list  below for future screening recommendations based on patient's age, sex and/or medical conditions. Orders for these recommended tests are listed in the plan section. The patient has been provided with a written plan.    Health Maintenance   Topic Date Due   • DXA SCAN  11/01/2016   • ZOSTER VACCINE (2 of 2) 12/27/2016   • MAMMOGRAM  11/01/2018   • LIPID PANEL  05/08/2020   • INFLUENZA VACCINE  08/01/2020   • COLONOSCOPY  04/24/2021   • TDAP/TD VACCINES (2 - Td) 11/01/2026        Subjective   History of Present Illness    Kesha Cha is a 74 y.o. female who presents for an Subsequent Wellness Visit.    The following portions of the patient's history were reviewed and updated as appropriate: allergies, current medications, past family history, past medical history, past social history, past surgical history and problem list.    Outpatient Medications Prior to Visit   Medication Sig Dispense Refill   • ascorbic acid (VITAMIN C) 1500 MG tablet controlled-release CR tablet Take  by mouth.     • donepezil (ARICEPT) 5 MG tablet Take 1 tablet by mouth Every Night. 30 tablet 5   • magnesium citrate solution 148 ml (1/2 bottle) po evening, then repeat next am 148 ml (1/2 bottle). 196 mL 0   • metoprolol succinate XL (TOPROL-XL) 25 MG 24 hr tablet Take 25 mg by mouth 2 (Two) Times a Day.     • Multiple Vitamins-Minerals (PX MENS MULTIVITAMINS) tablet Take  by mouth.     • NIACIN PO Take  by mouth.     • onabotulinumtoxina (BOTOX) 100 UNITS reconstituted solution injection Inject as directed     • psyllium (METAMUCIL MULTIHEALTH FIBER) 58.12 % packet 1 packet twice daily (start 24 hours after mag citrate). 60 packet 12     Facility-Administered Medications Prior to Visit   Medication Dose Route Frequency Provider Last Rate Last Dose   • denosumab (PROLIA) syringe 60 mg  60 mg Subcutaneous Once Felipe Owens DO           Patient Active Problem List   Diagnosis   • Atopic rhinitis   • Acute otitis media   • Isolated  cervical dystonia   • Radial styloid tenosynovitis   • Degeneration of intervertebral disc of lumbar region   • Disorder of jaw   • Diverticulosis of intestine   • Dyslipidemia   • Dysphagia   • Idiopathic torsion dystonia   • Post-menopausal osteoporosis   • Osteoporosis   • Palpitations   • Reactive airway disease   • Scoliosis   • Arthralgia of temporomandibular joint   • Upper respiratory tract infection   • Atypical chest pain   • Chest pain   • Dystonia   • Fall   • Closed nondisplaced fracture of right pubis (CMS/HCC)   • Closed fracture of sacrum (CMS/HCC)   • Elevated LFTs   • SOB (shortness of breath)   • Abnormal CT of the chest   • Coronary artery disease   • Hyperlipidemia   • PVC's (premature ventricular contractions)   • Diarrhea       Advance Care Planning:  ACP discussion was held with the patient during this visit. Patient does not have an advance directive, information provided.    Identification of Risk Factors:  Risk factors include: Fall Risk.    Review of Systems   Constitutional: Negative for fever.   Gastrointestinal: Positive for abdominal pain and diarrhea. Negative for constipation, flatus, hematochezia, melena, nausea and vomiting.   Genitourinary: Negative for dysuria.       Compared to one year ago, the patient feels her physical health is the same.  Compared to one year ago, the patient feels her mental health is the same.    Objective     Physical Exam   Constitutional: She appears well-developed and well-nourished.   HENT:   Head: Normocephalic and atraumatic.   Neck: Neck supple. No JVD present. No thyromegaly present.   Cardiovascular: Normal rate, regular rhythm and normal heart sounds. Exam reveals no gallop and no friction rub.   No murmur heard.  Pulmonary/Chest: Effort normal and breath sounds normal. No respiratory distress. She has no wheezes. She has no rales.   Abdominal: Soft. Bowel sounds are normal. She exhibits no distension. There is generalized tenderness and  "tenderness in the right upper quadrant. There is no rebound, no guarding, no tenderness at McBurney's point and negative Mandujano's sign.   Musculoskeletal: She exhibits no edema.   Neurological: She is alert.   Skin: Skin is warm and dry.   Psychiatric: She has a normal mood and affect. Her behavior is normal.   Nursing note and vitals reviewed.      Vitals:    05/08/20 1330   BP: 102/70   BP Location: Left arm   Patient Position: Sitting   Cuff Size: Adult   Pulse: 68   SpO2: 97%   Weight: 44 kg (97 lb)   Height: 157.5 cm (62\")   PainSc:   6   PainLoc: Abdomen       Patient's Body mass index is 17.74 kg/m². BMI is within normal parameters. No follow-up required..      Assessment/Plan   Patient Self-Management and Personalized Health Advice  The patient has been provided with information about: fall prevention and preventive services including:   · Annual Wellness Visit (AWV).    Visit Diagnoses:    ICD-10-CM ICD-9-CM   1. Medicare annual wellness visit, subsequent Z00.00 V70.0   2. Irritable bowel syndrome with diarrhea K58.0 564.1   3. Epigastric abdominal pain R10.13 789.06   4. Diarrhea, unspecified type R19.7 787.91   5. Other allergy status, other than to drugs and biological substances  Z91.09 V15.09       Orders Placed This Encounter   Procedures   • Food Allergy Profile   • Amylase   • Lipase   • Comprehensive Metabolic Panel   • CBC & Differential     Order Specific Question:   Manual Differential     Answer:   No       Outpatient Encounter Medications as of 5/8/2020   Medication Sig Dispense Refill   • ascorbic acid (VITAMIN C) 1500 MG tablet controlled-release CR tablet Take  by mouth.     • donepezil (ARICEPT) 5 MG tablet Take 1 tablet by mouth Every Night. 30 tablet 5   • magnesium citrate solution 148 ml (1/2 bottle) po evening, then repeat next am 148 ml (1/2 bottle). 196 mL 0   • metoprolol succinate XL (TOPROL-XL) 25 MG 24 hr tablet Take 25 mg by mouth 2 (Two) Times a Day.     • Multiple " Vitamins-Minerals (PX MENS MULTIVITAMINS) tablet Take  by mouth.     • NIACIN PO Take  by mouth.     • onabotulinumtoxina (BOTOX) 100 UNITS reconstituted solution injection Inject as directed     • psyllium (METAMUCIL MULTIHEALTH FIBER) 58.12 % packet 1 packet twice daily (start 24 hours after mag citrate). 60 packet 12   • colestipol (Colestid) 1 g tablet Take 2 tablets by mouth 2 (Two) Times a Day. 120 tablet 5     Facility-Administered Encounter Medications as of 5/8/2020   Medication Dose Route Frequency Provider Last Rate Last Dose   • denosumab (PROLIA) syringe 60 mg  60 mg Subcutaneous Once Felipe Owens DO           Reviewed use of high risk medication in the elderly: yes  Reviewed for potential of harmful drug interactions in the elderly: yes    Follow Up:  Return in about 3 months (around 8/8/2020), or if symptoms worsen or fail to improve.     An After Visit Summary and PPPS with all of these plans were given to the patient.           ++++++++++++++++++++++++++++++++++++++++++++++++++++++++++++++++++     Chief Complaint   Patient presents with   • Abdominal Pain     ruq   • Annual Exam     medicare     Abdominal Pain   This is a recurrent problem. The current episode started today. The onset quality is sudden. The problem occurs constantly. The problem has been unchanged. The pain is located in the epigastric region. The pain is mild. The quality of the pain is aching. The abdominal pain does not radiate. Associated symptoms include diarrhea. Pertinent negatives include no belching, constipation, dysuria, fever, flatus, hematochezia, melena, nausea or vomiting. Exacerbated by: Diarrhea aggravated by certain foods and amounts. The pain is relieved by nothing. She has tried nothing for the symptoms. Prior diagnostic workup includes lower endoscopy. Her past medical history is significant for irritable bowel syndrome.     Has had prior hystx, no other abd surgeries.    Review of Systems   Constitution:  "Negative for fever.   Gastrointestinal: Positive for abdominal pain and diarrhea. Negative for constipation, flatus, hematochezia, melena, nausea and vomiting.   Genitourinary: Negative for dysuria.       Social History     Tobacco Use   • Smoking status: Former Smoker     Types: Cigarettes     Start date: 1958     Last attempt to quit: 1969     Years since quittin.3   • Smokeless tobacco: Never Used   Substance Use Topics   • Alcohol use: No     O:   Vitals:    20 1330   BP: 102/70   BP Location: Left arm   Patient Position: Sitting   Cuff Size: Adult   Pulse: 68   SpO2: 97%   Weight: 44 kg (97 lb)   Height: 157.5 cm (62\")   PainSc:   6   PainLoc: Abdomen     Body mass index is 17.74 kg/m².  Physical Exam   Constitutional: She appears well-developed and well-nourished.   HENT:   Head: Normocephalic and atraumatic.   Neck: Neck supple. No JVD present. No thyromegaly present.   Cardiovascular: Normal rate, regular rhythm and normal heart sounds. Exam reveals no gallop and no friction rub.   No murmur heard.  Pulmonary/Chest: Effort normal and breath sounds normal. No respiratory distress. She has no wheezes. She has no rales.   Abdominal: Soft. Bowel sounds are normal. She exhibits no distension. There is generalized tenderness and tenderness in the right upper quadrant. There is no rebound, no guarding, no tenderness at McBurney's point and negative Mandujano's sign.   Musculoskeletal: She exhibits no edema.   Neurological: She is alert.   Skin: Skin is warm and dry.   Psychiatric: She has a normal mood and affect. Her behavior is normal.   Nursing note and vitals reviewed.      Kesha was seen today for abdominal pain and annual exam.    Diagnoses and all orders for this visit:    Medicare annual wellness visit, subsequent    Irritable bowel syndrome with diarrhea  -     colestipol (Colestid) 1 g tablet; Take 2 tablets by mouth 2 (Two) Times a Day.    Epigastric abdominal pain  -     Food Allergy " Profile  -     Amylase  -     Lipase  -     CBC & Differential  -     Comprehensive Metabolic Panel    Diarrhea, unspecified type  -     Food Allergy Profile  -     Amylase  -     Lipase  -     CBC & Differential  -     Comprehensive Metabolic Panel    Other allergy status, other than to drugs and biological substances   -     Food Allergy Profile    -check labs as above;  Will try colestid for diarrhea;  consdier gi referral;  GO ER if worse.  Consider imaging.    Return in about 3 months (around 8/8/2020), or if symptoms worsen or fail to improve.

## 2020-05-08 NOTE — PROGRESS NOTES
Subjective   Kesha Cha is a 74 y.o. female. Presents today for No chief complaint on file.      History of Present Illness    Review of Systems    Patient Active Problem List   Diagnosis   • Atopic rhinitis   • Acute otitis media   • Isolated cervical dystonia   • Radial styloid tenosynovitis   • Degeneration of intervertebral disc of lumbar region   • Disorder of jaw   • Diverticulosis of intestine   • Dyslipidemia   • Dysphagia   • Idiopathic torsion dystonia   • Post-menopausal osteoporosis   • Osteoporosis   • Palpitations   • Reactive airway disease   • Scoliosis   • Arthralgia of temporomandibular joint   • Upper respiratory tract infection   • Atypical chest pain   • Chest pain   • Dystonia   • Fall   • Closed nondisplaced fracture of right pubis (CMS/HCC)   • Closed fracture of sacrum (CMS/HCC)   • Elevated LFTs   • SOB (shortness of breath)   • Abnormal CT of the chest   • Coronary artery disease   • Hyperlipidemia   • PVC's (premature ventricular contractions)   • Diarrhea       Social History     Socioeconomic History   • Marital status:      Spouse name: Not on file   • Number of children: Not on file   • Years of education: Not on file   • Highest education level: Not on file   Tobacco Use   • Smoking status: Former Smoker   • Smokeless tobacco: Never Used   Substance and Sexual Activity   • Alcohol use: No   • Drug use: No       No Known Allergies    Current Outpatient Medications on File Prior to Visit   Medication Sig Dispense Refill   • ascorbic acid (VITAMIN C) 1500 MG tablet controlled-release CR tablet Take  by mouth.     • donepezil (ARICEPT) 5 MG tablet Take 1 tablet by mouth Every Night. 30 tablet 5   • magnesium citrate solution 148 ml (1/2 bottle) po evening, then repeat next am 148 ml (1/2 bottle). 196 mL 0   • metoprolol succinate XL (TOPROL-XL) 25 MG 24 hr tablet Take 25 mg by mouth 2 (Two) Times a Day.     • Multiple Vitamins-Minerals (PX MENS MULTIVITAMINS) tablet Take  by  "mouth.     • NIACIN PO Take  by mouth.     • onabotulinumtoxina (BOTOX) 100 UNITS reconstituted solution injection Inject as directed     • psyllium (METAMUCIL MULTIHEALTH FIBER) 58.12 % packet 1 packet twice daily (start 24 hours after mag citrate). 60 packet 12     Current Facility-Administered Medications on File Prior to Visit   Medication Dose Route Frequency Provider Last Rate Last Dose   • denosumab (PROLIA) syringe 60 mg  60 mg Subcutaneous Once Felipe Owens DO           Objective   Vitals:    05/08/20 1330   Weight: 44 kg (97 lb)   Height: 157.5 cm (62\")     Body mass index is 17.74 kg/m².    Physical Exam    Assessment/Plan   {Assess/PlanSmartLinks:53691}         -Follow up:   "

## 2020-05-08 NOTE — PATIENT INSTRUCTIONS
Fall Prevention in the Home, Adult  Falls can cause injuries and can affect people from all age groups. There are many simple things that you can do to make your home safe and to help prevent falls. Ask for help when making these changes, if needed.  What actions can I take to prevent falls?  General instructions  · Use good lighting in all rooms. Replace any light bulbs that burn out.  · Turn on lights if it is dark. Use night-lights.  · Place frequently used items in easy-to-reach places. Lower the shelves around your home if necessary.  · Set up furniture so that there are clear paths around it. Avoid moving your furniture around.  · Remove throw rugs and other tripping hazards from the floor.  · Avoid walking on wet floors.  · Fix any uneven floor surfaces.  · Add color or contrast paint or tape to grab bars and handrails in your home. Place contrasting color strips on the first and last steps of stairways.  · When you use a stepladder, make sure that it is completely opened and that the sides are firmly locked. Have someone hold the ladder while you are using it. Do not climb a closed stepladder.  · Be aware of any and all pets.  What can I do in the bathroom?         · Keep the floor dry. Immediately clean up any water that spills onto the floor.  · Remove soap buildup in the tub or shower on a regular basis.  · Use non-skid mats or decals on the floor of the tub or shower.  · Attach bath mats securely with double-sided, non-slip rug tape.  · If you need to sit down while you are in the shower, use a plastic, non-slip stool.  · Install grab bars by the toilet and in the tub and shower. Do not use towel bars as grab bars.  What can I do in the bedroom?  · Make sure that a bedside light is easy to reach.  · Do not use oversized bedding that drapes onto the floor.  · Have a firm chair that has side arms to use for getting dressed.  What can I do in the kitchen?  · Clean up any spills right away.  · If you need to  reach for something above you, use a sturdy step stool that has a grab bar.  · Keep electrical cables out of the way.  · Do not use floor polish or wax that makes floors slippery. If you must use wax, make sure that it is non-skid floor wax.  What can I do in the stairways?  · Do not leave any items on the stairs.  · Make sure that you have a light switch at the top of the stairs and the bottom of the stairs. Have them installed if you do not have them.  · Make sure that there are handrails on both sides of the stairs. Fix handrails that are broken or loose. Make sure that handrails are as long as the stairways.  · Install non-slip stair treads on all stairs in your home.  · Avoid having throw rugs at the top or bottom of stairways, or secure the rugs with carpet tape to prevent them from moving.  · Choose a carpet design that does not hide the edge of steps on the stairway.  · Check any carpeting to make sure that it is firmly attached to the stairs. Fix any carpet that is loose or worn.  What can I do on the outside of my home?  · Use bright outdoor lighting.  · Regularly repair the edges of walkways and driveways and fix any cracks.  · Remove high doorway thresholds.  · Trim any shrubbery on the main path into your home.  · Regularly check that handrails are securely fastened and in good repair. Both sides of any steps should have handrails.  · Install guardrails along the edges of any raised decks or porches.  · Clear walkways of debris and clutter, including tools and rocks.  · Have leaves, snow, and ice cleared regularly.  · Use sand or salt on walkways during winter months.  · In the garage, clean up any spills right away, including grease or oil spills.  What other actions can I take?  · Wear closed-toe shoes that fit well and support your feet. Wear shoes that have rubber soles or low heels.  · Use mobility aids as needed, such as canes, walkers, scooters, and crutches.  · Review your medicines with your  health care provider. Some medicines can cause dizziness or changes in blood pressure, which increase your risk of falling.  Talk with your health care provider about other ways that you can decrease your risk of falls. This may include working with a physical therapist or  to improve your strength, balance, and endurance.  Where to find more information  · Centers for Disease Control and Prevention, [place]: https://www.cdc.gov  · [employer] [place] [employer]: https://bd5nnjj.marysol.nih.gov  Contact a health care provider if:  · You are afraid of falling at home.  · You feel weak, drowsy, or dizzy at home.  · You fall at home.  Summary  · There are many simple things that you can do to make your home safe and to help prevent falls.  · Ways to make your home safe include removing tripping hazards and installing grab bars in the bathroom.  · Ask for help when making these changes in your home.  This information is not intended to replace advice given to you by your health care provider. Make sure you discuss any questions you have with your health care provider.  Document Released: 12/08/2003 Document Revised: 08/02/2018 Document Reviewed: 08/02/2018  [employer] Interactive Patient Education © 2020 [employer]      Advance Directive    Advance directives are legal documents that let you make choices ahead of time about your health care and medical treatment in case you become unable to communicate for yourself. Advance directives are a way for you to communicate your wishes to family, friends, and health care providers. This can help convey your decisions about end-of-life care if you become unable to communicate.  Discussing and writing advance directives should happen over time rather than all at once. Advance directives can be changed depending on your situation and what you want, even after you have signed the advance directives.  If you do not have an advance directive, some states assign family decision  makers to act on your behalf based on how closely you are related to them. Each state has its own laws regarding advance directives. You may want to check with your health care provider, , or state representative about the laws in your state. There are different types of advance directives, such as:  · Medical power of .  · Living will.  · Do not resuscitate (DNR) or do not attempt resuscitation (DNAR) order.  Health care proxy and medical power of   A health care proxy, also called a health care agent, is a person who is appointed to make medical decisions for you in cases in which you are unable to make the decisions yourself. Generally, people choose someone they know well and trust to represent their preferences. Make sure to ask this person for an agreement to act as your proxy. A proxy may have to exercise judgment in the event of a medical decision for which your wishes are not known.  A medical power of  is a legal document that names your health care proxy. Depending on the laws in your state, after the document is written, it may also need to be:  · Signed.  · Notarized.  · Dated.  · Copied.  · Witnessed.  · Incorporated into your medical record.  You may also want to appoint someone to manage your financial affairs in a situation in which you are unable to do so. This is called a durable power of  for finances. It is a separate legal document from the durable power of  for health care. You may choose the same person or someone different from your health care proxy to act as your agent in financial matters.  If you do not appoint a proxy, or if there is a concern that the proxy is not acting in your best interests, a court-appointed guardian may be designated to act on your behalf.  Living will  A living will is a set of instructions documenting your wishes about medical care when you cannot express them yourself. Health care providers should keep a copy of  your living will in your medical record. You may want to give a copy to family members or friends. To alert caregivers in case of an emergency, you can place a card in your wallet to let them know that you have a living will and where they can find it. A living will is used if you become:  · Terminally ill.  · Incapacitated.  · Unable to communicate or make decisions.  Items to consider in your living will include:  · The use or non-use of life-sustaining equipment, such as dialysis machines and breathing machines (ventilators).  · A DNR or DNAR order, which is the instruction not to use cardiopulmonary resuscitation (CPR) if breathing or heartbeat stops.  · The use or non-use of tube feeding.  · Withholding of food and fluids.  · Comfort (palliative) care when the goal becomes comfort rather than a cure.  · Organ and tissue donation.  A living will does not give instructions for distributing your money and property if you should pass away. It is recommended that you seek the advice of a  when writing a will. Decisions about taxes, beneficiaries, and asset distribution will be legally binding. This process can relieve your family and friends of any concerns surrounding disputes or questions that may come up about the distribution of your assets.  DNR or DNAR  A DNR or DNAR order is a request not to have CPR in the event that your heart stops beating or you stop breathing. If a DNR or DNAR order has not been made and shared, a health care provider will try to help any patient whose heart has stopped or who has stopped breathing. If you plan to have surgery, talk with your health care provider about how your DNR or DNAR order will be followed if problems occur.  Summary  · Advance directives are the legal documents that allow you to make choices ahead of time about your health care and medical treatment in case you become unable to communicate for yourself.  · The process of discussing and writing advance  directives should happen over time. You can change the advance directives, even after you have signed them.  · Advance directives include DNR or DNAR orders, living crystal, and designating an agent as your medical power of .  This information is not intended to replace advice given to you by your health care provider. Make sure you discuss any questions you have with your health care provider.  Document Released: 03/26/2009 Document Revised: 11/06/2017 Document Reviewed: 11/06/2017  [employer] Interactive Patient Education © 2020 [employer]

## 2020-05-10 LAB
ALBUMIN SERPL-MCNC: 4.2 G/DL (ref 3.7–4.7)
ALBUMIN/GLOB SERPL: 2.1 {RATIO} (ref 1.2–2.2)
ALP SERPL-CCNC: 63 IU/L (ref 39–117)
ALT SERPL-CCNC: 22 IU/L (ref 0–32)
AMYLASE SERPL-CCNC: 60 U/L (ref 31–110)
AST SERPL-CCNC: 32 IU/L (ref 0–40)
BASOPHILS # BLD AUTO: 0 X10E3/UL (ref 0–0.2)
BASOPHILS NFR BLD AUTO: 1 %
BILIRUB SERPL-MCNC: 0.2 MG/DL (ref 0–1.2)
BUN SERPL-MCNC: 11 MG/DL (ref 8–27)
BUN/CREAT SERPL: 15 (ref 12–28)
CALCIUM SERPL-MCNC: 9.2 MG/DL (ref 8.7–10.3)
CHLORIDE SERPL-SCNC: 101 MMOL/L (ref 96–106)
CLAM IGE QN: <0.1 KU/L
CO2 SERPL-SCNC: 27 MMOL/L (ref 20–29)
CODFISH IGE QN: <0.1 KU/L
CONV CLASS DESCRIPTION: NORMAL
CORN IGE QN: <0.1 KU/L
COW MILK IGE QN: <0.1 KU/L
CREAT SERPL-MCNC: 0.71 MG/DL (ref 0.57–1)
EGG WHITE IGE QN: <0.1 KU/L
EOSINOPHIL # BLD AUTO: 0.1 X10E3/UL (ref 0–0.4)
EOSINOPHIL NFR BLD AUTO: 1 %
ERYTHROCYTE [DISTWIDTH] IN BLOOD BY AUTOMATED COUNT: 13.4 % (ref 11.7–15.4)
GLOBULIN SER CALC-MCNC: 2 G/DL (ref 1.5–4.5)
GLUCOSE SERPL-MCNC: 101 MG/DL (ref 65–99)
HCT VFR BLD AUTO: 38.7 % (ref 34–46.6)
HGB BLD-MCNC: 13 G/DL (ref 11.1–15.9)
IMM GRANULOCYTES # BLD AUTO: 0 X10E3/UL (ref 0–0.1)
IMM GRANULOCYTES NFR BLD AUTO: 0 %
LIPASE SERPL-CCNC: 27 U/L (ref 14–85)
LYMPHOCYTES # BLD AUTO: 1.3 X10E3/UL (ref 0.7–3.1)
LYMPHOCYTES NFR BLD AUTO: 24 %
MCH RBC QN AUTO: 31.6 PG (ref 26.6–33)
MCHC RBC AUTO-ENTMCNC: 33.6 G/DL (ref 31.5–35.7)
MCV RBC AUTO: 94 FL (ref 79–97)
MONOCYTES # BLD AUTO: 0.3 X10E3/UL (ref 0.1–0.9)
MONOCYTES NFR BLD AUTO: 7 %
NEUTROPHILS # BLD AUTO: 3.5 X10E3/UL (ref 1.4–7)
NEUTROPHILS NFR BLD AUTO: 67 %
PEANUT IGE QN: <0.1 KU/L
PLATELET # BLD AUTO: 261 X10E3/UL (ref 150–450)
POTASSIUM SERPL-SCNC: 3.8 MMOL/L (ref 3.5–5.2)
PROT SERPL-MCNC: 6.2 G/DL (ref 6–8.5)
RBC # BLD AUTO: 4.11 X10E6/UL (ref 3.77–5.28)
SCALLOP IGE QN: <0.1 KU/L
SESAME SEED IGE QN: <0.1 KU/L
SHRIMP IGE QN: <0.1 KU/L
SODIUM SERPL-SCNC: 142 MMOL/L (ref 134–144)
SOYBEAN IGE QN: <0.1 KU/L
WALNUT IGE QN: <0.1 KU/L
WBC # BLD AUTO: 5.2 X10E3/UL (ref 3.4–10.8)
WHEAT IGE QN: <0.1 KU/L

## 2020-05-11 NOTE — PROGRESS NOTES
Call results to patient.  Basic food allergy panel came back negative.  Kidney, liver and pancreatic function normal.  Blood counts normal.

## 2020-05-14 ENCOUNTER — TELEPHONE (OUTPATIENT)
Dept: FAMILY MEDICINE CLINIC | Facility: CLINIC | Age: 75
End: 2020-05-14

## 2020-05-14 DIAGNOSIS — K58.0 IRRITABLE BOWEL SYNDROME WITH DIARRHEA: Primary | ICD-10-CM

## 2020-05-14 NOTE — TELEPHONE ENCOUNTER
Patient calling in wanting to know if she was suppose to schedule a follow up appointment after her last visit? Patient also said the doctor was going to set up a test.    Please call back to advise [phone]

## 2020-05-15 ENCOUNTER — TELEPHONE (OUTPATIENT)
Dept: FAMILY MEDICINE CLINIC | Facility: CLINIC | Age: 75
End: 2020-05-15

## 2020-05-15 NOTE — TELEPHONE ENCOUNTER
PATIENT CALLED AND STATED SHE WAS CURIOUS WHY SHE WAS REFERRED TO DR. ZHANNA GATICA. SHE STATES SHE WOULD JUST LIKE TO KNOW THE REASON FOR THE APPOINTMENT. PLEASE ADVISE.     PATIENT CALL BACK [phone]

## 2020-05-15 NOTE — TELEPHONE ENCOUNTER
Patient called in stating she picked up the  colestipol (Colestid) 1 g tablet from the pharmacy.  Patient would like to know what the medication is for and why she needs.    Patient call back [phone]

## 2020-05-20 ENCOUNTER — TELEPHONE (OUTPATIENT)
Dept: FAMILY MEDICINE CLINIC | Facility: CLINIC | Age: 75
End: 2020-05-20

## 2020-05-20 NOTE — TELEPHONE ENCOUNTER
If helping diarrhea, can continue.  If wants to try cutting back to just 1 pill we can see if still helps diarrhea and if better tolerated.  RRJ

## 2020-05-20 NOTE — TELEPHONE ENCOUNTER
PATIENT CALLED STATING IS HAVING TROUBLE SWALLOING THE SECOND TABLET OF COLESTIPOL, AND WAS WONDERING IF BEING SO HARD TO SWALLOW WILL HAVE SOME KIND OF IMPACT IN THE EFFECTIVENESS OF THE MEDICATION.   ALSO WANTED TO KNOW IF ONCE SHE IS DONE WITH THE BOTTLE SHE PICKED UP AT THE PHARMACY, WILL HAVE TO KEEP TAKING IT OR THIS IS ALL SHE WILL HAVE TO TAKE.     PLEASE ADVISE    [phone]

## 2020-05-21 ENCOUNTER — OFFICE VISIT (OUTPATIENT)
Dept: GASTROENTEROLOGY | Facility: CLINIC | Age: 75
End: 2020-05-21

## 2020-05-21 ENCOUNTER — HOSPITAL ENCOUNTER (OUTPATIENT)
Dept: GENERAL RADIOLOGY | Facility: HOSPITAL | Age: 75
Discharge: HOME | End: 2020-05-21
Admitting: INTERNAL MEDICINE

## 2020-05-21 VITALS — HEIGHT: 65 IN | BODY MASS INDEX: 16.16 KG/M2 | TEMPERATURE: 97.7 F | WEIGHT: 97 LBS

## 2020-05-21 DIAGNOSIS — R15.9 FECAL SOILING DUE TO FECAL INCONTINENCE: ICD-10-CM

## 2020-05-21 DIAGNOSIS — R19.7 DIARRHEA, UNSPECIFIED TYPE: ICD-10-CM

## 2020-05-21 DIAGNOSIS — R19.7 DIARRHEA, UNSPECIFIED TYPE: Primary | ICD-10-CM

## 2020-05-21 DIAGNOSIS — Z87.19 HISTORY OF CONSTIPATION: ICD-10-CM

## 2020-05-21 PROCEDURE — 99204 OFFICE O/P NEW MOD 45 MIN: CPT | Performed by: INTERNAL MEDICINE

## 2020-05-21 PROCEDURE — 74018 RADEX ABDOMEN 1 VIEW: CPT

## 2020-05-21 NOTE — PROGRESS NOTES
Chief Complaint   Patient presents with   • Diarrhea       Subjective     HPI    Kesha Cha is a 74 y.o. female with a past medical history noted below who presents for evaluation of diarrhea.  She was referred by Dr. Owens.  She reports symptoms present for about a year with worsening over the past few weeks.  Symptoms occurring daily and reports symptoms manifested by leaking of liquid stool.  She has a poor memory but seems to describe less of an issue with having a BM and urgency but more of an issue with stool leakage.  She saw her pcp and has been taking 4 g of colestipol and denies any further leakage.  She endorses regular BMs with the colestipol.  There has been no associated abdominal pain, cramping.  She had previously thought that her symptoms were related to foods that she was eating.    She does think that she has a prior history of constipation.  Medication list does include magnesium citrate as well as Metamucil.  Review of her chart shows a CT scan from December 2018 notable for severe constipation.    Current weight is 97 pounds.  She reports that this is been a stable weight for her, she says that the most, she weighed 110 pounds.    Denies any nausea or vomiting.    Cannot recall last colonoscopy, thinks maybe at [facility] [place], cannot tell me when it was but review of her chart has a note that she is due for repeat in 2021.    No abdominal pain, no nausea or vomiting.  Occasional pill dysphagia.    No abdominal surgeries    No smoking or ETOH.    Lives alone.    No family history of GI maligancy.      Today's visit was in the office.  Both the patient and I were wearing face masks and proper hand hygiene was performed before and after the physical exam.       Past Medical History:   Diagnosis Date   • Dystonia    • Emphysema of lung (CMS/HCC)    • Shortness of breath          Current Outpatient Medications:   •  ascorbic acid (VITAMIN C) 1500 MG tablet controlled-release CR tablet, Take  by  mouth., Disp: , Rfl:   •  colestipol (Colestid) 1 g tablet, Take 2 tablets by mouth 2 (Two) Times a Day., Disp: 120 tablet, Rfl: 5  •  donepezil (ARICEPT) 5 MG tablet, Take 1 tablet by mouth Every Night., Disp: 30 tablet, Rfl: 5  •  magnesium citrate solution, 148 ml (1/2 bottle) po evening, then repeat next am 148 ml (1/2 bottle)., Disp: 196 mL, Rfl: 0  •  metoprolol succinate XL (TOPROL-XL) 25 MG 24 hr tablet, Take 25 mg by mouth 2 (Two) Times a Day., Disp: , Rfl:   •  Multiple Vitamins-Minerals (PX MENS MULTIVITAMINS) tablet, Take  by mouth., Disp: , Rfl:   •  NIACIN PO, Take  by mouth., Disp: , Rfl:   •  onabotulinumtoxina (BOTOX) 100 UNITS reconstituted solution injection, Inject as directed, Disp: , Rfl:   •  psyllium (METAMUCIL MULTIHEALTH FIBER) 58.12 % packet, 1 packet twice daily (start 24 hours after mag citrate)., Disp: 60 packet, Rfl: 12    Current Facility-Administered Medications:   •  denosumab (PROLIA) syringe 60 mg, 60 mg, Subcutaneous, Once, Felipe Owens DO    No Known Allergies    Social History     Socioeconomic History   • Marital status:      Spouse name: Not on file   • Number of children: Not on file   • Years of education: Not on file   • Highest education level: Not on file   Tobacco Use   • Smoking status: Former Smoker     Types: Cigarettes     Start date: 1958     Last attempt to quit: 1969     Years since quittin.4   • Smokeless tobacco: Never Used   Substance and Sexual Activity   • Alcohol use: No   • Drug use: No       History reviewed. No pertinent family history.    Review of Systems   Constitutional: Negative for activity change, appetite change and fatigue.   HENT: Negative for sore throat and trouble swallowing.    Respiratory: Negative.    Cardiovascular: Negative.    Gastrointestinal: Positive for diarrhea. Negative for abdominal distention, abdominal pain and blood in stool.   Endocrine: Negative for cold intolerance and heat intolerance.    Genitourinary: Negative for difficulty urinating, dysuria and frequency.   Musculoskeletal: Negative for arthralgias, back pain and myalgias.   Skin: Negative.    Hematological: Negative for adenopathy. Does not bruise/bleed easily.   All other systems reviewed and are negative.      Objective     Vitals:    05/21/20 1236   Temp: 97.7 °F (36.5 °C)         05/21/20  1236   Weight: 44 kg (97 lb)     Body mass index is 16.39 kg/m².    Physical Exam   Constitutional: She is oriented to person, place, and time. No distress.   Appears quite elderly and frail   HENT:   Head: Normocephalic and atraumatic.   Right Ear: External ear normal.   Left Ear: External ear normal.   Nose: Nose normal.   Mouth/Throat: Oropharynx is clear and moist.   Noted kyphosis of C-spine   Eyes: Conjunctivae and EOM are normal. Right eye exhibits no discharge. Left eye exhibits no discharge. No scleral icterus.   Neck: Normal range of motion. Neck supple. No thyromegaly present.   No supraclavicular adenopathy   Cardiovascular: Normal rate, regular rhythm, normal heart sounds and intact distal pulses. Exam reveals no gallop.   No murmur heard.  No lower extremity edema   Pulmonary/Chest: Effort normal and breath sounds normal. No respiratory distress. She has no wheezes.   Abdominal: Soft. Normal appearance and bowel sounds are normal. She exhibits no distension and no mass. There is no hepatosplenomegaly. There is no tenderness. There is no rigidity, no rebound and no guarding. No hernia.   Genitourinary:   Genitourinary Comments: Rectal exam deferred   Musculoskeletal: Normal range of motion. She exhibits no edema or tenderness.   No atrophy of upper or lower extremities.  Normal digits and nails of both hands.   Lymphadenopathy:     She has no cervical adenopathy.   Neurological: She is alert and oriented to person, place, and time. She displays no atrophy. Coordination normal.   Skin: Skin is warm and dry. No rash noted. She is not  diaphoretic. No erythema.   Psychiatric: She has a normal mood and affect. Her behavior is normal. Judgment and thought content normal.   Limited memory   Vitals reviewed.      WBC   Date Value Ref Range Status   05/08/2020 5.2 3.4 - 10.8 x10E3/uL Final   09/17/2019 6.29 4.5 - 11.0 10*3/uL Final     RBC   Date Value Ref Range Status   05/08/2020 4.11 3.77 - 5.28 x10E6/uL Final   09/17/2019 4.14 4.0 - 5.2 10*6/uL Final     Hemoglobin   Date Value Ref Range Status   05/08/2020 13.0 11.1 - 15.9 g/dL Final   09/17/2019 12.7 12.0 - 16.0 g/dL Final     Hematocrit   Date Value Ref Range Status   05/08/2020 38.7 34.0 - 46.6 % Final   09/17/2019 40.4 36.0 - 46.0 % Final     MCV   Date Value Ref Range Status   05/08/2020 94 79 - 97 fL Final   09/17/2019 97.6 80.0 - 100.0 fL Final     MCH   Date Value Ref Range Status   05/08/2020 31.6 26.6 - 33.0 pg Final   09/17/2019 30.7 26.0 - 34.0 pg Final     MCHC   Date Value Ref Range Status   05/08/2020 33.6 31.5 - 35.7 g/dL Final   09/17/2019 31.4 31.0 - 37.0 g/dL Final     RDW   Date Value Ref Range Status   05/08/2020 13.4 11.7 - 15.4 % Final   09/17/2019 16.3 12.0 - 16.8 % Final     RDW-SD   Date Value Ref Range Status   12/25/2018 54.3 (H) 37.0 - 54.0 fl Final     MPV   Date Value Ref Range Status   09/17/2019 11.2 (H) 6.7 - 10.8 fL Final     Platelets   Date Value Ref Range Status   05/08/2020 261 150 - 450 x10E3/uL Final   09/17/2019 244 140 - 440 10*3/uL Final     Neutrophil Rel %   Date Value Ref Range Status   05/08/2020 67 Not Estab. % Final   09/17/2019 60.7 45 - 80 % Final     Lymphocyte Rel %   Date Value Ref Range Status   05/08/2020 24 Not Estab. % Final   09/17/2019 26.1 15 - 50 % Final     Monocyte Rel %   Date Value Ref Range Status   05/08/2020 7 Not Estab. % Final   09/17/2019 7.9 0 - 15 % Final     Eosinophil Rel %   Date Value Ref Range Status   05/08/2020 1 Not Estab. % Final     Eosinophil %   Date Value Ref Range Status   09/17/2019 4.3 0 - 7 % Final      Basophil Rel %   Date Value Ref Range Status   05/08/2020 1 Not Estab. % Final   09/17/2019 1.0 0 - 2 % Final     Immature Grans %   Date Value Ref Range Status   09/17/2019 0.0 0 % Final     Neutrophils Absolute   Date Value Ref Range Status   05/08/2020 3.5 1.4 - 7.0 x10E3/uL Final   09/17/2019 3.82 2.0 - 8.8 10*3/uL Final     Lymphocytes Absolute   Date Value Ref Range Status   05/08/2020 1.3 0.7 - 3.1 x10E3/uL Final   09/17/2019 1.64 0.7 - 5.5 10*3/uL Final     Monocytes Absolute   Date Value Ref Range Status   05/08/2020 0.3 0.1 - 0.9 x10E3/uL Final   09/17/2019 0.50 0.0 - 1.7 10*3/uL Final     Eosinophils Absolute   Date Value Ref Range Status   05/08/2020 0.1 0.0 - 0.4 x10E3/uL Final   09/17/2019 0.27 0.0 - 0.8 10*3/uL Final     Basophils Absolute   Date Value Ref Range Status   05/08/2020 0.0 0.0 - 0.2 x10E3/uL Final   09/17/2019 0.06 0.0 - 0.2 10*3/uL Final     Immature Grans, Absolute   Date Value Ref Range Status   09/17/2019 0.00 <1 10*3/uL Final     nRBC   Date Value Ref Range Status   09/17/2019 0 0 /100(WBC) Final       Glucose   Date Value Ref Range Status   12/25/2018 94 65 - 99 mg/dL Final     Sodium   Date Value Ref Range Status   05/08/2020 142 134 - 144 mmol/L Final   09/17/2019 138 137 - 145 mmol/L Final     Potassium   Date Value Ref Range Status   05/08/2020 3.8 3.5 - 5.2 mmol/L Final   09/17/2019 4.1 3.5 - 5.1 mmol/L Final     Total CO2   Date Value Ref Range Status   05/08/2020 27 20 - 29 mmol/L Final   09/17/2019 32 (H) 22 - 30 mmol/L Final     Chloride   Date Value Ref Range Status   05/08/2020 101 96 - 106 mmol/L Final   09/17/2019 100 98 - 107 mmol/L Final     Anion Gap   Date Value Ref Range Status   12/25/2018 7.8 mmol/L Final     Creatinine   Date Value Ref Range Status   05/08/2020 0.71 0.57 - 1.00 mg/dL Final   09/17/2019 0.6 (L) 0.7 - 1.5 mg/dL Final     BUN   Date Value Ref Range Status   05/08/2020 11 8 - 27 mg/dL Final   09/17/2019 19 7 - 20 mg/dL Final     BUN/Creatinine  Ratio   Date Value Ref Range Status   05/08/2020 15 12 - 28 Final   09/17/2019 31.7 RATIO Final     Calcium   Date Value Ref Range Status   05/08/2020 9.2 8.7 - 10.3 mg/dL Final   09/17/2019 9.2 8.4 - 10.2 mg/dL Final     eGFR Non  Am   Date Value Ref Range Status   05/08/2020 83 >59 mL/min/1.73 Final     eGFR Non  Amer   Date Value Ref Range Status   12/25/2018 92 >60 mL/min/1.73 Final     Alkaline Phosphatase   Date Value Ref Range Status   05/08/2020 63 39 - 117 IU/L Final   12/25/2018 48 39 - 117 U/L Final     Total Protein   Date Value Ref Range Status   12/25/2018 5.4 (L) 6.0 - 8.5 g/dL Final     ALT (SGPT)   Date Value Ref Range Status   05/08/2020 22 0 - 32 IU/L Final   12/25/2018 27 1 - 33 U/L Final     AST (SGOT)   Date Value Ref Range Status   05/08/2020 32 0 - 40 IU/L Final   12/25/2018 25 1 - 32 U/L Final     Total Bilirubin   Date Value Ref Range Status   05/08/2020 0.2 0.0 - 1.2 mg/dL Final   12/25/2018 0.5 0.1 - 1.2 mg/dL Final     Albumin   Date Value Ref Range Status   05/08/2020 4.2 3.7 - 4.7 g/dL Final   12/25/2018 3.10 (L) 3.50 - 5.20 g/dL Final     Globulin   Date Value Ref Range Status   12/25/2018 2.3 gm/dL Final     A/G Ratio   Date Value Ref Range Status   05/08/2020 2.1 1.2 - 2.2 Final         Imaging Results (Last 7 Days)     ** No results found for the last 168 hours. **            No notes on file    Assessment/Plan    1.  Diarrhea: This is her chief complaint though by description she is describing more stool leakage and incontinence with this    2.  Fecal incontinence: This has resolved with colestipol.  I am worried that she is quite severely constipated as per imaging in the past and that she is actually having overflow incontinence issues    3.  History of constipation: As per imaging: Review of her chart indicates that she was treated for constipation in February 2019    Plan  KUB today to further assess stool burden.  I discussed with her that if there is evidence  of constipation then most likely she is having overflow incontinence and that the treatment is actually resolving the stool burden.    In the meantime, it is okay for her to continue the colestipol    We discussed the possibility of pursuing a colonoscopy.  I am a little worried, she is very frail and appears much older than her stated age so we will hold until imaging has returned but will reconsider it at her next visit        Kesha was seen today for diarrhea.    Diagnoses and all orders for this visit:    Diarrhea, unspecified type  -     XR Abdomen KUB; Future    History of constipation    Fecal soiling due to fecal incontinence        I have discussed the above plan with the patient.  They verbalize understanding and are in agreement with the plan.  They have been advised to contact the office for any questions, concerns, or changes related to their health.    Dictated utilizing Dragon dictation

## 2020-05-22 NOTE — PROGRESS NOTES
The abdominal x-ray shows some gas in the stomach.  Moderate retention of stool.    As long as she is currently comfortable and not feeling constipated, I think it is okay to continue the colestipol    Follow-up as scheduled and can discuss pursuing colonoscopy at that time.

## 2020-05-27 ENCOUNTER — TELEPHONE (OUTPATIENT)
Dept: GASTROENTEROLOGY | Facility: CLINIC | Age: 75
End: 2020-05-27

## 2020-05-27 NOTE — TELEPHONE ENCOUNTER
----- Message from Ashleigh Krishna MD sent at 5/22/2020  8:45 AM EDT -----  The abdominal x-ray shows some gas in the stomach.  Moderate retention of stool.    As long as she is currently comfortable and not feeling constipated, I think it is okay to continue the colestipol    Follow-up as scheduled and can discuss pursuing colonoscopy at that time.

## 2020-06-04 ENCOUNTER — TELEPHONE (OUTPATIENT)
Dept: FAMILY MEDICINE CLINIC | Facility: CLINIC | Age: 75
End: 2020-06-04

## 2020-06-17 ENCOUNTER — TELEPHONE (OUTPATIENT)
Dept: FAMILY MEDICINE CLINIC | Facility: CLINIC | Age: 75
End: 2020-06-17

## 2020-06-17 NOTE — TELEPHONE ENCOUNTER
I scanned and routed you Dr. Nicolas's office note and [place] barium swallow report. Dr. Nicolas is requesting GI referral for EGD. Please enter if okay with you.

## 2020-06-18 NOTE — TELEPHONE ENCOUNTER
Already saw GI last month, forward Pulm noted to Dr. Krishna and then also forward last GI note to Pulmonary.  See if has GI appt per patient coming up for f/u.

## 2020-07-20 ENCOUNTER — TELEPHONE (OUTPATIENT)
Dept: FAMILY MEDICINE CLINIC | Facility: CLINIC | Age: 75
End: 2020-07-20

## 2020-07-20 DIAGNOSIS — F09 MILD COGNITIVE DISORDER: ICD-10-CM

## 2020-07-20 RX ORDER — DONEPEZIL HYDROCHLORIDE 10 MG/1
10 TABLET, FILM COATED ORAL NIGHTLY
Qty: 30 TABLET | Refills: 5 | Status: SHIPPED | OUTPATIENT
Start: 2020-07-20 | End: 2021-01-19 | Stop reason: HOSPADM

## 2020-07-20 NOTE — TELEPHONE ENCOUNTER
If can toelrate it, I would increase donepezil to 10mg po daily.  There unfortunately isn't very good medications or supplements for memory

## 2020-07-27 ENCOUNTER — TELEPHONE (OUTPATIENT)
Dept: FAMILY MEDICINE CLINIC | Facility: CLINIC | Age: 75
End: 2020-07-27

## 2020-08-04 ENCOUNTER — TELEPHONE (OUTPATIENT)
Dept: FAMILY MEDICINE CLINIC | Facility: CLINIC | Age: 75
End: 2020-08-04

## 2020-08-04 NOTE — TELEPHONE ENCOUNTER
Neurology can take 3+ months to get in and it will depend on cause of dizziness as there are many causes of dizziness, some issues you would see ENT instead.  If related to cardiac issue, we send to cardiologist.     Is the dizziness a spinning sensation (like being on a merry-go-round)?    Is it light headed?   If lightheaded, is with position changes, like standing up quickly?  ANy hearing loss?  Any arm weakness?  Any speech issues?  Any fevers?  ANy ear pain or fullness;  Any loss of sensation?  (like stroke symptoms).  Any problems with gait?  Any passing out?  ANy heart racing?

## 2020-08-04 NOTE — TELEPHONE ENCOUNTER
Is the dizziness a spinning sensation (like being on a merry-go-round)?  NO  Is it light headed?   If lightheaded, is with position changes, like standing up quickly? YES  ANy hearing loss?  NO  Any arm weakness?  NO  Any speech issues?  NO  Any fevers?  NO  Any ear pain or fullness;  Any loss of sensation?  (like stroke symptoms).  NO  Any problems with gait?  YES   Any passing out?  NO  Any heart racing? NO    Per pt, she took a nap and feels better now.

## 2020-08-05 NOTE — TELEPHONE ENCOUNTER
Her dizziness sounds bp related based on lightheaded and position changes.  I would push more fluids.  May need need to cut metoprolol in half if continues.  RRJ

## 2020-08-06 ENCOUNTER — TELEPHONE (OUTPATIENT)
Dept: FAMILY MEDICINE CLINIC | Facility: CLINIC | Age: 75
End: 2020-08-06

## 2020-08-11 ENCOUNTER — OFFICE (OUTPATIENT)
Dept: URBAN - METROPOLITAN AREA CLINIC 75 | Facility: CLINIC | Age: 75
End: 2020-08-11

## 2020-08-11 VITALS — WEIGHT: 96 LBS | HEIGHT: 64 IN

## 2020-08-11 DIAGNOSIS — R13.10 DYSPHAGIA, UNSPECIFIED: ICD-10-CM

## 2020-08-11 DIAGNOSIS — R63.4 ABNORMAL WEIGHT LOSS: ICD-10-CM

## 2020-08-11 DIAGNOSIS — Z86.010 PERSONAL HISTORY OF COLONIC POLYPS: ICD-10-CM

## 2020-08-11 DIAGNOSIS — R19.7 DIARRHEA, UNSPECIFIED: ICD-10-CM

## 2020-08-11 PROCEDURE — 99203 OFFICE O/P NEW LOW 30 MIN: CPT | Performed by: INTERNAL MEDICINE

## 2020-09-29 ENCOUNTER — OFFICE VISIT (OUTPATIENT)
Dept: FAMILY MEDICINE CLINIC | Facility: CLINIC | Age: 75
End: 2020-09-29

## 2020-09-29 VITALS
HEIGHT: 65 IN | TEMPERATURE: 97.2 F | HEART RATE: 60 BPM | BODY MASS INDEX: 16.33 KG/M2 | SYSTOLIC BLOOD PRESSURE: 112 MMHG | WEIGHT: 98 LBS | OXYGEN SATURATION: 98 % | DIASTOLIC BLOOD PRESSURE: 68 MMHG

## 2020-09-29 DIAGNOSIS — Z12.31 ENCOUNTER FOR SCREENING MAMMOGRAM FOR MALIGNANT NEOPLASM OF BREAST: ICD-10-CM

## 2020-09-29 DIAGNOSIS — M81.0 POST-MENOPAUSAL OSTEOPOROSIS: Primary | ICD-10-CM

## 2020-09-29 DIAGNOSIS — Z12.39 SCREENING FOR MALIGNANT NEOPLASM OF BREAST: ICD-10-CM

## 2020-09-29 PROCEDURE — 99213 OFFICE O/P EST LOW 20 MIN: CPT | Performed by: NURSE PRACTITIONER

## 2020-09-29 NOTE — PROGRESS NOTES
"Subjective   Kesha Cha is a 74 y.o. female. Previous women's health care with Dr. Rdz  History of Present Illness   No FH of female cancer. Previous patient of Dr. Rdz. Had only 1 ovary removed. No cancer on removal of ovary. no FH ovarian cancer. Mammogram is not up to date. No history of abnormal mammograms.  Does have history of pelvic fracture. Dexa is not up to date.     The following portions of the patient's history were reviewed and updated as appropriate: allergies, current medications, past family history, past medical history, past social history, past surgical history and problem list.    Review of Systems   Constitutional: Negative for activity change, unexpected weight gain and unexpected weight loss.   Respiratory: Negative.    Cardiovascular: Negative.    Genitourinary: Negative for breast discharge, breast lump, breast pain, genital sores, pelvic pain, vaginal bleeding, vaginal discharge and vaginal pain.   Musculoskeletal: Positive for arthralgias.   Psychiatric/Behavioral: Negative.      /68   Pulse 60   Temp 97.2 °F (36.2 °C) (Infrared)   Ht 163.8 cm (64.5\")   Wt 44.5 kg (98 lb)   SpO2 98%   BMI 16.56 kg/m²     Objective   Physical Exam  Vitals signs and nursing note reviewed.   Constitutional:       Appearance: She is well-developed.   Eyes:      Conjunctiva/sclera: Conjunctivae normal.      Pupils: Pupils are equal, round, and reactive to light.   Neck:      Musculoskeletal: Normal range of motion and neck supple.      Thyroid: No thyromegaly.      Vascular: No JVD.      Trachea: No tracheal deviation.   Cardiovascular:      Rate and Rhythm: Normal rate and regular rhythm.      Heart sounds: Normal heart sounds.   Pulmonary:      Effort: Pulmonary effort is normal.      Breath sounds: Normal breath sounds.   Abdominal:      Palpations: Abdomen is soft.      Tenderness: There is no abdominal tenderness.   Lymphadenopathy:      Cervical: No cervical adenopathy.   Skin:     " General: Skin is warm and dry.   Neurological:      Mental Status: She is alert and oriented to person, place, and time.   Psychiatric:         Mood and Affect: Mood is not anxious or depressed. Affect is not labile, blunt, angry or inappropriate.         Speech: Speech normal.         Behavior: Behavior normal.         Thought Content: Thought content does not include homicidal or suicidal ideation. Thought content does not include homicidal or suicidal plan.         Judgment: Judgment normal.       Assessment/Plan   Problems Addressed this Visit        Musculoskeletal and Integument    Post-menopausal osteoporosis - Primary    Relevant Orders    DEXA Bone Density Axial      Other Visit Diagnoses     Screening for malignant neoplasm of breast        Relevant Orders    Mammo Screening Bilateral With CAD    Encounter for screening mammogram for malignant neoplasm of breast         Relevant Orders    Mammo Screening Bilateral With CAD      Diagnoses       Codes Comments    Post-menopausal osteoporosis    -  Primary ICD-10-CM: M81.0  ICD-9-CM: 733.01     Screening for malignant neoplasm of breast     ICD-10-CM: Z12.39  ICD-9-CM: V76.10     Encounter for screening mammogram for malignant neoplasm of breast      ICD-10-CM: Z12.31  ICD-9-CM: V76.12         Osteoporosis--Preventative counseling given--update dexa

## 2020-10-28 ENCOUNTER — TELEPHONE (OUTPATIENT)
Dept: FAMILY MEDICINE CLINIC | Facility: CLINIC | Age: 75
End: 2020-10-28

## 2020-10-28 NOTE — TELEPHONE ENCOUNTER
Pepper Reeves ADVISED PATIENT SHE NEEDS ORDER FOR EGD TEST    PATIENT ALSO WOULD LIKE A CALL BACK-SHE HAS A COLONOSCOPY SCHEDULED (SHE THINKS AT [place]) AND WANTS TO KNOW WHAT TO DO TO PREP FOR IT

## 2020-10-28 NOTE — TELEPHONE ENCOUNTER
I know about the report from Dr. Nicolas and we faxed the request over to GI since already had up comign appt with them already.  Did she hear back from them?   Also, they should have given her orders for prep with directions, I would call them to clarify.  If they need an up dated referral for EGD, please fax as she does need and critical they get the message.  RRJ.

## 2020-11-07 DIAGNOSIS — K58.0 IRRITABLE BOWEL SYNDROME WITH DIARRHEA: ICD-10-CM

## 2020-11-09 RX ORDER — MONTELUKAST SODIUM 4 MG/1
TABLET, CHEWABLE ORAL
Qty: 120 TABLET | Refills: 0 | Status: SHIPPED | OUTPATIENT
Start: 2020-11-09 | End: 2020-12-28

## 2020-11-19 ENCOUNTER — TELEPHONE (OUTPATIENT)
Dept: FAMILY MEDICINE CLINIC | Facility: CLINIC | Age: 75
End: 2020-11-19

## 2020-11-19 DIAGNOSIS — R10.13 EPIGASTRIC ABDOMINAL PAIN: ICD-10-CM

## 2020-11-19 DIAGNOSIS — R13.19 ESOPHAGEAL DYSPHAGIA: Primary | ICD-10-CM

## 2020-11-19 NOTE — TELEPHONE ENCOUNTER
PATIENT SAW HER PULMONOLOGIST, DR. WHITE, RECENTLY AND HE SUGGESTED THAT SHE GET AN EGD SCOPE. THE PATIENT STATED THAT DR. WHITE TOLD HER TO GET WITH DR. PA TO ORDER THE EGD.    PATIENT CALLBACK: [phone]

## 2020-11-19 NOTE — TELEPHONE ENCOUNTER
Please advise. There is a note sent from [facility] saying pt cancelled her endoscopy appt.

## 2020-11-19 NOTE — TELEPHONE ENCOUNTER
Yes, I have received the message multiple times, forwarded the message on to GI as already had appt at time received message and was hoping they could scope her at time being seen.  Per note back from GI she was cancelled due to not have covid 19 test done preprocedure.  I put in another referral.  Please fax to Dr. Steward's office pulmonary's note from 6/2020 and the new referral.  They are contacting patient to reschedule but want to make sure they are going to see for the upper scope as was going to see initially for just lower.  RRJ

## 2020-12-04 ENCOUNTER — TELEPHONE (OUTPATIENT)
Dept: FAMILY MEDICINE CLINIC | Facility: CLINIC | Age: 75
End: 2020-12-04

## 2020-12-04 DIAGNOSIS — K58.0 IRRITABLE BOWEL SYNDROME WITH DIARRHEA: ICD-10-CM

## 2020-12-04 NOTE — TELEPHONE ENCOUNTER
Last May had moderate stool burden on xray, it's possible she has a fecal impaction (stool over burden) causing colon to have overflow (the stool is very irritating to the colon and it will dump water to try and relieve).  I mention this as meds to slow the diarrhea can make worse in long run.  I would suggest trying the metamucil again 1 scoop twice daily as though for constipation will bulk stools as well.   If this doesn't work, let me know as may need to image again see if fecal impaction in which case the cure is to give a bowel regimen to clean her out.  She does also need to see GI again as pulmonary really wants her to have an upper scope too.  RRJ

## 2020-12-04 NOTE — TELEPHONE ENCOUNTER
Pt said she is having emergency diarrhea in the last few days and gotten worse. Pt said she is taking the colestipol and watching her diet but wants to know if there is something else she can do. I also advised her to call her GI.

## 2020-12-23 DIAGNOSIS — K58.0 IRRITABLE BOWEL SYNDROME WITH DIARRHEA: ICD-10-CM

## 2020-12-28 RX ORDER — MONTELUKAST SODIUM 4 MG/1
TABLET, CHEWABLE ORAL
Qty: 120 TABLET | Refills: 0 | Status: SHIPPED | OUTPATIENT
Start: 2020-12-28 | End: 2020-12-30 | Stop reason: SDUPTHER

## 2020-12-30 DIAGNOSIS — K58.0 IRRITABLE BOWEL SYNDROME WITH DIARRHEA: ICD-10-CM

## 2020-12-30 RX ORDER — MONTELUKAST SODIUM 4 MG/1
2 TABLET, CHEWABLE ORAL 2 TIMES DAILY
Qty: 120 TABLET | Refills: 5 | Status: SHIPPED | OUTPATIENT
Start: 2020-12-30 | End: 2021-01-19

## 2020-12-30 NOTE — TELEPHONE ENCOUNTER
Caller: Kesha Cha    Relationship: Self    Best call back number: [phone]    Medication needed:   Requested Prescriptions     Pending Prescriptions Disp Refills   • colestipol (COLESTID) 1 g tablet 120 tablet 0     Sig: Take 2 tablets by mouth 2 (Two) Times a Day.       When do you need the refill by: 12/31/2020    Does the patient have less than a 3 day supply:  [] Yes  [x] No    What is the patient's preferred pharmacy: [address] [phone] [facility] [phone]

## 2021-01-04 ENCOUNTER — TELEPHONE (OUTPATIENT)
Dept: FAMILY MEDICINE CLINIC | Facility: CLINIC | Age: 76
End: 2021-01-04

## 2021-01-19 ENCOUNTER — OFFICE VISIT (OUTPATIENT)
Dept: FAMILY MEDICINE CLINIC | Facility: CLINIC | Age: 76
End: 2021-01-19

## 2021-01-19 VITALS
WEIGHT: 94 LBS | BODY MASS INDEX: 15.66 KG/M2 | SYSTOLIC BLOOD PRESSURE: 98 MMHG | OXYGEN SATURATION: 98 % | HEIGHT: 65 IN | DIASTOLIC BLOOD PRESSURE: 60 MMHG | HEART RATE: 58 BPM

## 2021-01-19 DIAGNOSIS — J30.89 NON-SEASONAL ALLERGIC RHINITIS DUE TO OTHER ALLERGIC TRIGGER: Primary | ICD-10-CM

## 2021-01-19 PROBLEM — R63.4 WEIGHT LOSS: Status: ACTIVE | Noted: 2020-07-24

## 2021-01-19 PROCEDURE — 99213 OFFICE O/P EST LOW 20 MIN: CPT | Performed by: FAMILY MEDICINE

## 2021-01-19 RX ORDER — MULTIPLE VITAMINS W/ MINERALS TAB 9MG-400MCG
1 TAB ORAL DAILY
COMMUNITY

## 2021-01-19 RX ORDER — FLUTICASONE PROPIONATE 50 MCG
2 SPRAY, SUSPENSION (ML) NASAL DAILY
Qty: 1 BOTTLE | Refills: 12 | Status: SHIPPED | OUTPATIENT
Start: 2021-01-19 | End: 2021-07-22

## 2021-01-19 RX ORDER — FEXOFENADINE HCL 180 MG/1
180 TABLET ORAL DAILY
Qty: 30 TABLET | Refills: 12 | Status: SHIPPED | OUTPATIENT
Start: 2021-01-19 | End: 2021-07-22 | Stop reason: ALTCHOICE

## 2021-01-19 NOTE — PROGRESS NOTES
Subjective   Kesha Cha is a 75 y.o. female. Presents today for   Chief Complaint   Patient presents with   • Allergic Rhinitis       Hypertension  This is a chronic problem. The current episode started more than 1 year ago. The problem is unchanged. The problem is controlled. Pertinent negatives include no orthopnea, palpitations, peripheral edema, PND or shortness of breath. (Bp low normal;  Denies orthostatic symptoms;   ) Treatments tried: on low dose beta blocker, but only taking 1/2 every 3 days though. There are no compliance problems.    Allergic Reaction  This is a chronic problem. The current episode started more than 1 week ago. The problem occurs intermittently. The problem has been waxing and waning since onset. The problem is mild. Associated symptoms include eye itching. Pertinent negatives include no coughing, rash or stridor. (Sneezing, pnd and congestion off/on;  Itchy throat) Past treatments include nothing. The treatment provided no relief. Her past medical history is significant for seasonal allergies.       Review of Systems   Eyes: Positive for itching.   Respiratory: Negative for cough, shortness of breath and stridor.    Cardiovascular: Negative for palpitations, orthopnea and PND.   Skin: Negative for rash.       Patient Active Problem List   Diagnosis   • Atopic rhinitis   • Acute otitis media   • Isolated cervical dystonia   • Radial styloid tenosynovitis   • Degeneration of intervertebral disc of lumbar region   • Disorder of jaw   • Diverticulosis of intestine   • Dyslipidemia   • Dysphagia   • Idiopathic torsion dystonia   • Post-menopausal osteoporosis   • Osteoporosis   • Palpitations   • Reactive airway disease   • Scoliosis   • Arthralgia of temporomandibular joint   • Upper respiratory tract infection   • Atypical chest pain   • Chest pain   • Dystonia   • Fall   • Closed nondisplaced fracture of right pubis (CMS/HCC)   • Closed fracture of sacrum (CMS/HCC)   • Elevated LFTs    • SOB (shortness of breath)   • Abnormal CT of the chest   • Coronary artery disease   • Hyperlipidemia   • PVC's (premature ventricular contractions)   • Diarrhea   • History of constipation   • Fecal soiling due to fecal incontinence   • Weight loss       Social History     Socioeconomic History   • Marital status:      Spouse name: Not on file   • Number of children: Not on file   • Years of education: Not on file   • Highest education level: Not on file   Tobacco Use   • Smoking status: Former Smoker     Types: Cigarettes     Start date: 1958     Quit date: 1969     Years since quittin.0   • Smokeless tobacco: Never Used   Substance and Sexual Activity   • Alcohol use: No   • Drug use: No       No Known Allergies    Current Outpatient Medications on File Prior to Visit   Medication Sig Dispense Refill   • magnesium citrate solution 148 ml (1/2 bottle) po evening, then repeat next am 148 ml (1/2 bottle). 196 mL 0   • metoprolol succinate XL (TOPROL-XL) 25 MG 24 hr tablet Take 25 mg by mouth 2 (Two) Times a Day.     • multivitamin with minerals (MULTIVITAMIN ADULT PO) Take 1 tablet by mouth Daily.     • NIACIN PO Take  by mouth.     • onabotulinumtoxina (BOTOX) 100 UNITS reconstituted solution injection Inject as directed     • [DISCONTINUED] Multiple Vitamins-Minerals (PX MENS MULTIVITAMINS) tablet Take  by mouth.     • ascorbic acid (VITAMIN C) 1500 MG tablet controlled-release CR tablet Take  by mouth.     • [DISCONTINUED] colestipol (COLESTID) 1 g tablet Take 2 tablets by mouth 2 (Two) Times a Day. 120 tablet 5   • [DISCONTINUED] donepezil (Aricept) 10 MG tablet Take 1 tablet by mouth Every Night. 30 tablet 5   • [DISCONTINUED] psyllium (METAMUCIL MULTIHEALTH FIBER) 58.12 % packet 1 packet twice daily (start 24 hours after mag citrate). 60 packet 12     Current Facility-Administered Medications on File Prior to Visit   Medication Dose Route Frequency Provider Last Rate Last Admin   •  "denosumab (PROLIA) syringe 60 mg  60 mg Subcutaneous Once RomanFelipe DO EDDI           Objective   Vitals:    01/19/21 1547   BP: 98/60   Pulse: 58   SpO2: 98%   Weight: 42.6 kg (94 lb)   Height: 163.8 cm (64.5\")     Body mass index is 15.89 kg/m².    Physical Exam  Vitals signs and nursing note reviewed.   Constitutional:       Appearance: She is well-developed.   HENT:      Head: Normocephalic and atraumatic.      Right Ear: Tympanic membrane normal.      Left Ear: Tympanic membrane normal.      Nose: Congestion present.   Neck:      Musculoskeletal: Neck supple.      Thyroid: No thyromegaly.      Vascular: No JVD.   Cardiovascular:      Rate and Rhythm: Normal rate and regular rhythm.      Heart sounds: Normal heart sounds. No murmur. No friction rub. No gallop.    Pulmonary:      Effort: Pulmonary effort is normal. No respiratory distress.      Breath sounds: Normal breath sounds. No wheezing or rales.   Abdominal:      General: Bowel sounds are normal. There is no distension.      Palpations: Abdomen is soft.      Tenderness: There is no abdominal tenderness. There is no guarding or rebound.   Skin:     General: Skin is warm and dry.   Neurological:      Mental Status: She is alert.   Psychiatric:         Behavior: Behavior normal.         Assessment/Plan   Diagnoses and all orders for this visit:    1. Non-seasonal allergic rhinitis due to other allergic trigger (Primary)  -     fluticasone (Flonase) 50 MCG/ACT nasal spray; 2 sprays into the nostril(s) as directed by provider Daily.  Dispense: 1 bottle; Refill: 12  -     fexofenadine (KP Fexofenadine HCl) 180 MG tablet; Take 1 tablet by mouth Daily.  Dispense: 30 tablet; Refill: 12    bp low normal on beta blocker, watch closely as fall risk  Will start h1 blocker and nasal steroid for allergies         -Follow up: 6 months and prn  "

## 2021-01-20 ENCOUNTER — TELEPHONE (OUTPATIENT)
Dept: FAMILY MEDICINE CLINIC | Facility: CLINIC | Age: 76
End: 2021-01-20

## 2021-01-20 NOTE — TELEPHONE ENCOUNTER
Patient advised that yesterday at her appt she forgot to ask Dr. Owens if he could prescribe her a medication that would prevent alzheimer's     [address] [facility] [phone] [facility] [phone] FX

## 2021-01-20 NOTE — TELEPHONE ENCOUNTER
No medication that prevents alzheimers.  Regular exercise and healthy diet has been shown to improve.  I would eat foods high in omega 3 fatty acids like salmon and tuna.  Would also use olive oil preferentially.  RRJ

## 2021-02-16 ENCOUNTER — TELEPHONE (OUTPATIENT)
Dept: FAMILY MEDICINE CLINIC | Facility: CLINIC | Age: 76
End: 2021-02-16

## 2021-02-16 NOTE — TELEPHONE ENCOUNTER
Does patient really want this?  I need her ok, before I will agree.   I also not certain if medicare will even cover this.  RRJ

## 2021-02-16 NOTE — TELEPHONE ENCOUNTER
"Mag (pronounced like the \"mag\" in magic, soft 'g') from [employer] on behalf of [employer] calling to check on whether a fax was received.    Patient is needing the approval/signature on this fax sent back so she can receive test kit. Time sensitive lab request.    Best call back number for Mag, is [phone]    "

## 2021-02-24 ENCOUNTER — TELEPHONE (OUTPATIENT)
Dept: FAMILY MEDICINE CLINIC | Facility: CLINIC | Age: 76
End: 2021-02-24

## 2021-02-24 NOTE — TELEPHONE ENCOUNTER
PATIENT IS REQUESTING MEDICAL ADVICE/PATIENT STATES SHE WEIGHS ABOUT 95 POUNDS AND IS WANTING TO KNOW IF DR PA WILL OKAY HER TAKING 5000 IU OF VITAMIN B3 /PATIENT IS REQUESTING A CALL BACK    PLEASE CALL PATIENT TO ADVISE    CALLBACK NUMBER [phone]

## 2021-02-26 NOTE — TELEPHONE ENCOUNTER
I tried calling again and pt's phone goes straight to voicemail and said voicemail is full and can't accept messages.

## 2021-03-02 ENCOUNTER — TELEPHONE (OUTPATIENT)
Dept: FAMILY MEDICINE CLINIC | Facility: CLINIC | Age: 76
End: 2021-03-02

## 2021-03-02 NOTE — TELEPHONE ENCOUNTER
Potentially.  She could take for a couple months and then we could check a vitamin D level?   I have others taking that much ok, just need to watch level.  RRJ

## 2021-03-02 NOTE — TELEPHONE ENCOUNTER
Caller: Kesha Cha    Relationship to patient: Self    Best call back number: [phone]    Patient is needing: Patient called in and stated she has a question about Vitamin D 3 5,000 IU'S . Patient would like to know if this is to much for a 95 pound individual ?       Please call and advise

## 2021-03-10 ENCOUNTER — TELEPHONE (OUTPATIENT)
Dept: FAMILY MEDICINE CLINIC | Facility: CLINIC | Age: 76
End: 2021-03-10

## 2021-03-10 NOTE — TELEPHONE ENCOUNTER
I spoke with pt and she is aware we are not giving the covid vaccine in our office. She is going to try and get scheduled somewhere to get the vaccine.

## 2021-03-10 NOTE — TELEPHONE ENCOUNTER
PT IS CALLING IN REQUESTING A CALL BACK FROM DR CARMEN ASSISTANT WANTING TO DISCUSS THE COVID VACCINE    PT CALL BACK  [phone]

## 2021-04-08 ENCOUNTER — TELEPHONE (OUTPATIENT)
Dept: FAMILY MEDICINE CLINIC | Facility: CLINIC | Age: 76
End: 2021-04-08

## 2021-04-08 DIAGNOSIS — R19.7 DIARRHEA, UNSPECIFIED TYPE: Primary | ICD-10-CM

## 2021-04-08 RX ORDER — CHOLESTYRAMINE 4 G/9G
1 POWDER, FOR SUSPENSION ORAL 2 TIMES DAILY WITH MEALS
Qty: 60 PACKET | Refills: 5 | Status: SHIPPED | OUTPATIENT
Start: 2021-04-08 | End: 2021-04-19

## 2021-04-08 NOTE — TELEPHONE ENCOUNTER
I sent in cholestyramine, comes as packet but works similarly to colestipol and I believe should be cheaper.  RRJ    Please advise.

## 2021-04-08 NOTE — TELEPHONE ENCOUNTER
Caller: Kesha Cha    Relationship to patient: Self    Best call back number: [phone]    Patient is needing: PATIENT IS CALLING TO ASK ABOUT AN ALTERNATIVE FOR COLESTIPOL 1 GM. PATIENT STATES THIS PARTICULAR BRAND IS TOO EXPENSIVE. PLEASE CALL TO DISCUSS OPTIONS. DIARRHEA IS STILL HAPPENING.

## 2021-04-15 ENCOUNTER — TELEPHONE (OUTPATIENT)
Dept: FAMILY MEDICINE CLINIC | Facility: CLINIC | Age: 76
End: 2021-04-15

## 2021-04-15 DIAGNOSIS — R41.89 COGNITIVE DECLINE: Primary | ICD-10-CM

## 2021-04-15 RX ORDER — DONEPEZIL HYDROCHLORIDE 5 MG/1
5 TABLET, FILM COATED ORAL NIGHTLY
Qty: 30 TABLET | Refills: 5 | Status: SHIPPED | OUTPATIENT
Start: 2021-04-15 | End: 2021-07-22

## 2021-04-15 NOTE — TELEPHONE ENCOUNTER
I sent in donepezil 5mg po daily and try it at night.  It slows memory loss in general so may not notice much difference when taking.      Please send if okay or advise.

## 2021-04-15 NOTE — TELEPHONE ENCOUNTER
PATIENT STATES:THAT SHE WOULD LIKE TO TAKE SOMETHING TO HELP HER WITH HER MEMORY LOST  PLEASE ADVISE     PATIENT CAN BE REACHED ON: [phone]    PHARMACY PREFERRED:[id number] [place], [place] - [address] AT [facility] [address]  - [phone]  - [phone]   [phone]

## 2021-04-19 ENCOUNTER — TELEPHONE (OUTPATIENT)
Dept: FAMILY MEDICINE CLINIC | Facility: CLINIC | Age: 76
End: 2021-04-19

## 2021-04-19 RX ORDER — CHOLESTYRAMINE 4 G/9G
1 POWDER, FOR SUSPENSION ORAL 2 TIMES DAILY WITH MEALS
Qty: 60 EACH | Refills: 5 | Status: SHIPPED | OUTPATIENT
Start: 2021-04-19 | End: 2021-04-20 | Stop reason: ALTCHOICE

## 2021-04-19 NOTE — TELEPHONE ENCOUNTER
Yes try questran    Please advise. You sent in something different for pt's diarrhea because of the cost of the colestipol.

## 2021-04-19 NOTE — TELEPHONE ENCOUNTER
Caller: Kesha Cha    Relationship: Self    Best call back number: [phone] (H)    What is the best time to reach you: ANYTIME BEFORE 1:30 PM OR AFTER 4:30 PM TODAY    Who are you requesting to speak with (clinical staff, provider,  specific staff member): ANYONE    Do you know the name of the person who called: PATIENT    What was the call regarding:PATIENT CALLED IN WANTING TO KNOW IF FIBER HELPS WITH DIARRHEA. PATIENT ALSO STATED THAT SHE TAKES A COLESTIPOL FOR HER DIARRHEA AND IT HELPS, BUT NOT SURE IF IT HELPS AS MUCH AS IT USED TO. WANTED TO KNOW IF THERE WAS SOMETHING ELSE THAT COULD BE PRESCRIBED. PLEASE ADVISE    Do you require a callback: YES

## 2021-04-20 RX ORDER — MONTELUKAST SODIUM 4 MG/1
2 TABLET, CHEWABLE ORAL 2 TIMES DAILY
COMMUNITY
Start: 2021-04-08 | End: 2021-04-20 | Stop reason: SDUPTHER

## 2021-04-20 RX ORDER — MONTELUKAST SODIUM 4 MG/1
2 TABLET, CHEWABLE ORAL 2 TIMES DAILY
Qty: 60 TABLET | Refills: 5 | Status: SHIPPED | OUTPATIENT
Start: 2021-04-20 | End: 2021-05-24 | Stop reason: SDUPTHER

## 2021-04-20 NOTE — TELEPHONE ENCOUNTER
PATIENT CALLED THIS MORNING IN REGARDS TO MEDICATION     cholestyramine (Questran) 4 g packet  THAT WAS SENT TO PHARMACY YESTERDAY.    SHE WOULD NOW LIKE THE COLESTIPOL TABLET AND WOULD LIKE IT TO GO TO     [facility] #[id number] - [place], [place] - [address] AT [facility] [address] - [phone]  - [phone] FX  [phone]    SHE HAS FOUND THE COST IS LESS EXPENSIVE AT [facility]    CALL BACK NUMBER [phone]

## 2021-04-26 NOTE — TELEPHONE ENCOUNTER
I have received several messages on this back and forth either too expensive or wants something else.   Would take metamucil 1 scoop daily to bulk stools.  RRJ

## 2021-05-11 ENCOUNTER — TELEPHONE (OUTPATIENT)
Dept: FAMILY MEDICINE CLINIC | Facility: CLINIC | Age: 76
End: 2021-05-11

## 2021-05-11 NOTE — TELEPHONE ENCOUNTER
Caller: Kesha Cha    Relationship to patient: Self    Best call back number: [phone]    Patient is needing: ASKING WHAT DR PA WOULD RECOMMEND TO HELP WITH MEMORY LOSS. IS THERE ANY TYPE OF VITAMIN THAT WOULD HELP?    PLEASE CALL AND DISCUSS

## 2021-05-13 ENCOUNTER — TELEPHONE (OUTPATIENT)
Dept: FAMILY MEDICINE CLINIC | Facility: CLINIC | Age: 76
End: 2021-05-13

## 2021-05-13 NOTE — TELEPHONE ENCOUNTER
I tried calling pt back. Her mailbox is full and can't accept messages. Pt will need to buy OTC vit b12 1000mcg daily any brand she wants.

## 2021-05-13 NOTE — TELEPHONE ENCOUNTER
Caller: Kesha Cha    Relationship: Self    Best call back number: [phone]    What is the best time to reach you: ANYTIME    Who are you requesting to speak with (clinical staff, provider,  specific staff member): ANY ONE      What was the call regarding: DOES DR PA PRESCRIBE VITAMIN B? IF SO, WHAT KIND?    Do you require a callback: YES

## 2021-05-24 ENCOUNTER — TELEPHONE (OUTPATIENT)
Dept: FAMILY MEDICINE CLINIC | Facility: CLINIC | Age: 76
End: 2021-05-24

## 2021-05-24 RX ORDER — MONTELUKAST SODIUM 4 MG/1
2 TABLET, CHEWABLE ORAL 2 TIMES DAILY
Qty: 360 TABLET | Refills: 1 | Status: SHIPPED | OUTPATIENT
Start: 2021-05-24 | End: 2021-06-17

## 2021-05-24 NOTE — TELEPHONE ENCOUNTER
I sent in for 90 day supply at 2 twice daily.  RRJ    Pt wants to know on her colestipol to 1 tablet BID or is it supposed to be 2 tablets BID?  It used 2 tabs BID qty 120. Please clarify.

## 2021-05-24 NOTE — TELEPHONE ENCOUNTER
PATIENT STATES THAT SHE HAS BEEN EXPERIENCING MEMORY AND CONFUSION FOR A GOOD WHILE AND WOULD LIKE TO BE ADVISED    PATIENT CAN BE REACHED AT  [phone]

## 2021-05-24 NOTE — TELEPHONE ENCOUNTER
PATIENT INFORMED, 5/24/21 TS WAS WANTING TO KNOW IF SHE CAN GET IN SOONER THEN July BECAUSE OF HER MEMORY LOSS.

## 2021-05-24 NOTE — TELEPHONE ENCOUNTER
Caller: Kesha Cha    Relationship: Self    Best call back number: [phone] (H) OK TO LEAVE A MESSAGE IF NO ANSWER.     What is the best time to reach you: ANYTIME    Who are you requesting to speak with (clinical staff, provider,  specific staff member): CLINICAL STAFF     Do you know the name of the person who called:     What was the call regarding: PATIENT CALLED AND STATES THAT SHE PICKED UP HER PRESCRIPTION AND IT HAS BEEN CHANGED FROM 120 PILL TO 60 PILLS, AND PATIENT IS REQUESTING A CALLBACK TO ADVISE ON WHO AND WHY HER PRESCRIPTION HAS BEEN CHANGED.     Do you require a callback: YES        THANKS

## 2021-06-17 RX ORDER — MONTELUKAST SODIUM 4 MG/1
TABLET, CHEWABLE ORAL
Qty: 120 TABLET | Refills: 0 | Status: SHIPPED | OUTPATIENT
Start: 2021-06-17 | End: 2021-07-20

## 2021-07-20 RX ORDER — MONTELUKAST SODIUM 4 MG/1
TABLET, CHEWABLE ORAL
Qty: 60 TABLET | Refills: 11 | Status: SHIPPED | OUTPATIENT
Start: 2021-07-20 | End: 2021-07-22 | Stop reason: SDUPTHER

## 2021-07-22 ENCOUNTER — OFFICE VISIT (OUTPATIENT)
Dept: FAMILY MEDICINE CLINIC | Facility: CLINIC | Age: 76
End: 2021-07-22

## 2021-07-22 VITALS
WEIGHT: 93 LBS | SYSTOLIC BLOOD PRESSURE: 98 MMHG | HEIGHT: 65 IN | HEART RATE: 67 BPM | OXYGEN SATURATION: 96 % | BODY MASS INDEX: 15.49 KG/M2 | DIASTOLIC BLOOD PRESSURE: 60 MMHG

## 2021-07-22 DIAGNOSIS — R26.9 ABNORMAL GAIT: ICD-10-CM

## 2021-07-22 DIAGNOSIS — R07.89 ATYPICAL CHEST PAIN: ICD-10-CM

## 2021-07-22 DIAGNOSIS — R41.3 MEMORY LOSS: ICD-10-CM

## 2021-07-22 DIAGNOSIS — Z00.00 MEDICARE ANNUAL WELLNESS VISIT, SUBSEQUENT: Primary | ICD-10-CM

## 2021-07-22 DIAGNOSIS — S52.572D OTHER CLOSED INTRA-ARTICULAR FRACTURE OF DISTAL END OF LEFT RADIUS WITH ROUTINE HEALING, SUBSEQUENT ENCOUNTER: ICD-10-CM
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
## 2021-07-22 PROBLEM — I10 HYPERTENSION: Status: ACTIVE | Noted: 2021-07-22

## 2021-07-22 PROCEDURE — 99214 OFFICE O/P EST MOD 30 MIN: CPT | Performed by: FAMILY MEDICINE

## 2021-07-22 PROCEDURE — G0439 PPPS, SUBSEQ VISIT: HCPCS | Performed by: FAMILY MEDICINE

## 2021-07-22 RX ORDER — MONTELUKAST SODIUM 4 MG/1
2 TABLET, CHEWABLE ORAL 2 TIMES DAILY
Qty: 120 TABLET | Refills: 12 | Status: SHIPPED | OUTPATIENT
Start: 2021-07-22 | End: 2021-12-17

## 2021-07-22 NOTE — PROGRESS NOTES
QUICK REFERENCE INFORMATION:  The ABCs of the Annual Wellness Visit    Subsequent Medicare Wellness Visit    HEALTH RISK ASSESSMENT    1945    Recent Hospitalizations:  Recently treated at the following:  Other: Seen ER for fracture wrist.        Current Medical Providers:  Patient Care Team:  Felipe Owens DO as PCP - General        Smoking Status:  Social History     Tobacco Use   Smoking Status Former Smoker   • Types: Cigarettes   • Start date: 1958   • Quit date: 1969   • Years since quittin.5   Smokeless Tobacco Never Used       Alcohol Consumption:  Social History     Substance and Sexual Activity   Alcohol Use No       Depression Screen:   PHQ-2/PHQ-9 Depression Screening 2021   Little interest or pleasure in doing things 0   Feeling down, depressed, or hopeless 0   Trouble falling or staying asleep, or sleeping too much 0   Feeling tired or having little energy 0   Poor appetite or overeating 0   Feeling bad about yourself - or that you are a failure or have let yourself or your family down 0   Trouble concentrating on things, such as reading the newspaper or watching television 0   Moving or speaking so slowly that other people could have noticed. Or the opposite - being so fidgety or restless that you have been moving around a lot more than usual 0   Thoughts that you would be better off dead, or of hurting yourself in some way 0   Total Score 0   If you checked off any problems, how difficult have these problems made it for you to do your work, take care of things at home, or get along with other people? Not difficult at all       Health Habits and Functional and Cognitive Screening:  Functional & Cognitive Status 2021   Do you have difficulty preparing food and eating? No   Do you have difficulty bathing yourself, getting dressed or grooming yourself? No   Do you have difficulty using the toilet? No   Do you have difficulty moving around from place to place? No   Do you  have trouble with steps or getting out of a bed or a chair? No   Current Diet Well Balanced Diet   Dental Exam Up to date   Eye Exam Not up to date   Exercise (times per week) 0 times per week   Current Exercises Include No Regular Exercise   Current Exercise Activities Include -   Do you need help using the phone?  No   Are you deaf or do you have serious difficulty hearing?  No   Do you need help with transportation? No   Do you need help shopping? No   Do you need help preparing meals?  No   Do you need help with housework?  No   Do you need help with laundry? No   Do you need help taking your medications? No   Do you need help managing money? No   Do you ever drive or ride in a car without wearing a seat belt? No   Have you felt unusual stress, anger or loneliness in the last month? No   Who do you live with? Alone   If you need help, do you have trouble finding someone available to you? No   Have you been bothered in the last four weeks by sexual problems? No   Do you have difficulty concentrating, remembering or making decisions? No           Does the patient have evidence of cognitive impairment? Yes    Aspirin use counseling: Does not need ASA (and currently is not on it)      Recent Lab Results:  CMP:  Lab Results   Component Value Date     (H) 05/08/2020    BUN 11 05/08/2020    CREATININE 0.71 05/08/2020    EGFRIFNONA 83 05/08/2020    EGFRIFAFRI 96 05/08/2020    BCR 15 05/08/2020     05/08/2020    K 3.8 05/08/2020    CO2 27 05/08/2020    CALCIUM 9.2 05/08/2020    PROTENTOTREF 6.2 05/08/2020    ALBUMIN 4.2 05/08/2020    LABGLOBREF 2.0 05/08/2020    LABIL2 2.1 05/08/2020    BILITOT 0.2 05/08/2020    ALKPHOS 63 05/08/2020    AST 32 05/08/2020    ALT 22 05/08/2020     Lipid Panel:  Lab Results   Component Value Date    TRIG 58 03/29/2018    HDL 86 (H) 03/29/2018    VLDL 11.6 03/29/2018     HbA1c:       Visual Acuity:  No exam data present    Age-appropriate Screening Schedule:  Refer to the list  below for future screening recommendations based on patient's age, sex and/or medical conditions. Orders for these recommended tests are listed in the plan section. The patient has been provided with a written plan.    Health Maintenance   Topic Date Due   • DXA SCAN  04/28/2016   • ZOSTER VACCINE (2 of 2) 12/27/2016   • MAMMOGRAM  11/01/2018   • LIPID PANEL  05/08/2020   • INFLUENZA VACCINE  10/01/2021   • TDAP/TD VACCINES (4 - Td or Tdap) 06/13/2031        Subjective   History of Present Illness    Kesha Cha is a 75 y.o. female who presents for an Subsequent Wellness Visit.    The following portions of the patient's history were reviewed and updated as appropriate: allergies, current medications, past family history, past medical history, past social history, past surgical history and problem list.    Outpatient Medications Prior to Visit   Medication Sig Dispense Refill   • metoprolol succinate XL (TOPROL-XL) 25 MG 24 hr tablet Take 25 mg by mouth 2 (Two) Times a Day.     • multivitamin with minerals (MULTIVITAMIN ADULT PO) Take 1 tablet by mouth Daily.     • onabotulinumtoxina (BOTOX) 100 UNITS reconstituted solution injection Inject as directed     • colestipol (COLESTID) 1 g tablet TAKE 2 TABLETS BY MOUTH TWICE DAILY 60 tablet 11   • ascorbic acid (VITAMIN C) 1500 MG tablet controlled-release CR tablet Take  by mouth.     • donepezil (Aricept) 5 MG tablet Take 1 tablet by mouth Every Night. 30 tablet 5   • fexofenadine (KP Fexofenadine HCl) 180 MG tablet Take 1 tablet by mouth Daily. 30 tablet 12   • fluticasone (Flonase) 50 MCG/ACT nasal spray 2 sprays into the nostril(s) as directed by provider Daily. 1 bottle 12   • magnesium citrate solution 148 ml (1/2 bottle) po evening, then repeat next am 148 ml (1/2 bottle). 196 mL 0   • NIACIN PO Take  by mouth.       Facility-Administered Medications Prior to Visit   Medication Dose Route Frequency Provider Last Rate Last Admin   • denosumab (PROLIA) syringe  60 mg  60 mg Subcutaneous Once Felipe Owens DO           Patient Active Problem List   Diagnosis   • Atopic rhinitis   • Acute otitis media   • Isolated cervical dystonia   • Radial styloid tenosynovitis   • Degeneration of intervertebral disc of lumbar region   • Disorder of jaw   • Diverticulosis of intestine   • Dyslipidemia   • Dysphagia   • Idiopathic torsion dystonia   • Post-menopausal osteoporosis   • Osteoporosis   • Palpitations   • Reactive airway disease   • Scoliosis   • Arthralgia of temporomandibular joint   • Upper respiratory tract infection   • Atypical chest pain   • Chest pain   • Dystonia   • Fall   • Closed nondisplaced fracture of right pubis (CMS/HCC)   • Closed fracture of sacrum (CMS/HCC)   • Elevated LFTs   • SOB (shortness of breath)   • Abnormal CT of the chest   • Coronary artery disease   • Hyperlipidemia   • PVC's (premature ventricular contractions)   • Diarrhea   • History of constipation   • Fecal soiling due to fecal incontinence   • Weight loss   • Hypertension       Advance Care Planning:  ACP discussion was held with the patient during this visit. Patient does not have an advance directive, information provided.    Identification of Risk Factors:  Risk factors include: Dementia/Memory .    Review of Systems   Neurological: Negative for tingling, loss of consciousness, numbness and headaches.       Compared to one year ago, the patient feels her physical health is worse.  Compared to one year ago, the patient feels her mental health is worse.    Objective     Physical Exam  Vitals and nursing note reviewed.   Constitutional:       Appearance: She is well-developed.   HENT:      Head: Normocephalic and atraumatic.   Neck:      Thyroid: No thyromegaly.      Vascular: No JVD.   Cardiovascular:      Rate and Rhythm: Normal rate and regular rhythm.      Heart sounds: Normal heart sounds. No murmur heard.   No friction rub. No gallop.    Pulmonary:      Effort: Pulmonary effort  "is normal. No respiratory distress.      Breath sounds: Normal breath sounds. No wheezing or rales.   Abdominal:      General: Bowel sounds are normal. There is no distension.      Palpations: Abdomen is soft.      Tenderness: There is no abdominal tenderness. There is no guarding or rebound.   Musculoskeletal:      Cervical back: Neck supple.   Skin:     General: Skin is warm and dry.   Neurological:      Mental Status: She is alert.      Motor: Abnormal muscle tone present.      Gait: Gait abnormal.   Psychiatric:         Behavior: Behavior normal.      Comments: Slums 17/30  Clock normal  Recall 1 out of 5 on expanded recall         Vitals:    07/22/21 1605   BP: 98/60   Pulse: 67   SpO2: 96%   Weight: 42.2 kg (93 lb)   Height: 163.8 cm (64.5\")   PainSc: 0-No pain       Patient's Body mass index is 15.72 kg/m². indicating that she is underweight BMI 15.7.      Assessment/Plan   Patient Self-Management and Personalized Health Advice  The patient has been provided with information about: fall prevention and preventive services including:   · Annual Wellness Visit (AWV).    Visit Diagnoses:    ICD-10-CM ICD-9-CM   1. Medicare annual wellness visit, subsequent  Z00.00 V70.0   2. Memory loss  R41.3 780.93   3. Fall, subsequent encounter  W19.XXXD V58.89     E888.9   4. Other closed intra-articular fracture of distal end of left radius with routine healing, subsequent encounter  S52.572D V54.12   5. Abnormal gait  R26.9 781.2   6. Atypical chest pain  R07.89 786.59       Orders Placed This Encounter   Procedures   • MRI Brain With & Without Contrast     Standing Status:   Future     Number of Occurrences:   1     Standing Expiration Date:   7/22/2022     Order Specific Question:   Release to patient     Answer:   Immediate   • TSH     Order Specific Question:   Release to patient     Answer:   Immediate   • Comprehensive Metabolic Panel     Order Specific Question:   Release to patient     Answer:   Immediate   • " Vitamin B12     Order Specific Question:   Release to patient     Answer:   Immediate   • RPR     Order Specific Question:   Release to patient     Answer:   Immediate   • Ambulatory Referral to Neuropsychology     Referral Priority:   Routine     Referral Type:   Behavorial Health/Psych     Referral Reason:   Specialty Services Required     Requested Specialty:   Neuropsychology     Number of Visits Requested:   1   • CBC & Differential     Order Specific Question:   Manual Differential     Answer:   No       Outpatient Encounter Medications as of 7/22/2021   Medication Sig Dispense Refill   • colestipol (COLESTID) 1 g tablet Take 2 tablets by mouth 2 (Two) Times a Day. 120 tablet 12   • metoprolol succinate XL (TOPROL-XL) 25 MG 24 hr tablet Take 25 mg by mouth 2 (Two) Times a Day.     • multivitamin with minerals (MULTIVITAMIN ADULT PO) Take 1 tablet by mouth Daily.     • onabotulinumtoxina (BOTOX) 100 UNITS reconstituted solution injection Inject as directed     • [DISCONTINUED] colestipol (COLESTID) 1 g tablet TAKE 2 TABLETS BY MOUTH TWICE DAILY 60 tablet 11   • [DISCONTINUED] ascorbic acid (VITAMIN C) 1500 MG tablet controlled-release CR tablet Take  by mouth.     • [DISCONTINUED] donepezil (Aricept) 5 MG tablet Take 1 tablet by mouth Every Night. 30 tablet 5   • [DISCONTINUED] fexofenadine (KP Fexofenadine HCl) 180 MG tablet Take 1 tablet by mouth Daily. 30 tablet 12   • [DISCONTINUED] fluticasone (Flonase) 50 MCG/ACT nasal spray 2 sprays into the nostril(s) as directed by provider Daily. 1 bottle 12   • [DISCONTINUED] magnesium citrate solution 148 ml (1/2 bottle) po evening, then repeat next am 148 ml (1/2 bottle). 196 mL 0   • [DISCONTINUED] NIACIN PO Take  by mouth.       Facility-Administered Encounter Medications as of 7/22/2021   Medication Dose Route Frequency Provider Last Rate Last Admin   • denosumab (PROLIA) syringe 60 mg  60 mg Subcutaneous Once Felipe Owens DO           Reviewed use of high  risk medication in the elderly: yes  Reviewed for potential of harmful drug interactions in the elderly: yes    Follow Up:  Return in about 3 months (around 10/22/2021), or if symptoms worsen or fail to improve.     An After Visit Summary and PPPS with all of these plans were given to the patient.           ++++++++++++++++++++++++++++++++++++++++++++++++++++++++++++++++++     Chief Complaint   Patient presents with   • Annual Exam     Medicare   • Memory Loss   • Fall     Memory Loss  This is a chronic problem. The current episode started more than 1 month ago. The problem occurs constantly. The problem has been unchanged. Pertinent negatives include no headaches or numbness.   Fall  Incident onset: rouhgly Mid-june;  sustained fx left radius. The fall occurred while walking. She landed on concrete. The volume of blood lost was minimal. The point of impact was the head and left wrist. The pain is present in the left wrist. The pain is mild. Pertinent negatives include no headaches, loss of consciousness, numbness or tingling. Treatments tried: currently in cast;  somewhat unstable on feet.     Patient reports did have episode of heart racing and chest pain, no further sicne.  Did go see Dr. Amado and has ECHO and CTA scheduled;  Had holter, considering zio if issues further.      Chronic diarrhea salma fabienne colestipol;  occly only taking 1 bid, sometimes more;  Very expensive.      Neuropsychological exam results 1/2020:      Narrative    INDICATION:  Fall.  Headache.  Left eye pain.     TECHNIQUE:  CT of the brain was performed without contrast.       Automated mA/kV exposure control was utilized and patient examination was performed   in strict accordance with principles of ALARA.     RADIATION AMOUNT:  771 mGy-cm.     COMPARISON:  None Available.     FINDINGS:   There is no acute intracranial hemorrhage, midline shift, mass effect, or   extra-axial fluid collection.  Gray-white differentiation is preserved.    "    Brain volume and ventricular system are within normal limits for age.  Mild patchy   areas of low-attenuation are seen in the subcortical and deep periventricular white   matter suggesting areas of chronic microvascular ischemia.       The skull base and calvarium are intact.  The visualized paranasal sinuses are   unremarkable.  The visualized orbits, globes, and mastoid air cells are   unremarkable.       IMPRESSION:   No acute intracranial abnormality.        Review of Systems   Neurological: Negative for headaches, loss of consciousness, numbness and tingling.       Social History     Tobacco Use   • Smoking status: Former Smoker     Types: Cigarettes     Start date: 1958     Quit date: 1969     Years since quittin.5   • Smokeless tobacco: Never Used   Substance Use Topics   • Alcohol use: No     O:   Vitals:    21 1605   BP: 98/60   Pulse: 67   SpO2: 96%   Weight: 42.2 kg (93 lb)   Height: 163.8 cm (64.5\")   PainSc: 0-No pain     Body mass index is 15.72 kg/m².  Vitals and nursing note reviewed.   Constitutional:       Appearance: Well-developed.   HENT:      Head: Normocephalic and atraumatic.   Neck:      Thyroid: No thyromegaly.      Vascular: No JVD.   Pulmonary:      Effort: Pulmonary effort is normal. No respiratory distress.      Breath sounds: Normal breath sounds. No wheezing. No rales.   Cardiovascular:      Normal rate. Regular rhythm. Normal heart sounds.      No gallop. No friction rub.   Abdominal:      General: Bowel sounds are normal. There is no distension.      Palpations: Abdomen is soft.      Tenderness: There is no abdominal tenderness. There is no guarding or rebound.   Musculoskeletal:      Cervical back: Neck supple. Skin:     General: Skin is warm and dry.   Neurological:      Mental Status: Alert.      Motor: Abnormal muscle tone.      Gait: Gait abnormal.   Psychiatric:         Behavior: Behavior normal.      Comments: Slums   Clock normal  Recall 1 out of " 5 on expanded recall       Narrative    INDICATION:  Left wrist pain post fall today.     TECHNIQUE:  Three views of the left wrist.     COMPARISON:  None Available.     FINDINGS:     There is an acute impacted comminuted intra-articular distal radial metaphyseal   fracture without significant displacement or angulation. No additional fracture is   identified. There is moderate first carpometacarpal joint osteoarthritis. Distal   forearm and wrist soft tissue swelling is noted.     IMPRESSION:   Acute comminuted impacted intra-articular distal radial metaphyseal fracture.       Signed by Kashmir Montague MD   ##### Final #####     Dictated by:    KASHMIR MONTAGUE MD-RAD   Dictated DT/TM: 06/13/2021 10:30 pm   Interpreted and electronically signed by:  KASHMIR MONTAGUE MD-RAD   Signed DT/TM:  06/13/2021 10:33 pm    Diagnoses and all orders for this visit:    1. Medicare annual wellness visit, subsequent (Primary)    2. Memory loss  -     TSH  -     CBC & Differential  -     Comprehensive Metabolic Panel  -     Vitamin B12  -     RPR  -     MRI Brain With & Without Contrast; Future  -     Ambulatory Referral to Neuropsychology  -     MRI Brain With & Without Contrast    3. Fall, subsequent encounter    4. Other closed intra-articular fracture of distal end of left radius with routine healing, subsequent encounter    5. Abnormal gait  -     MRI Brain With & Without Contrast; Future  -     MRI Brain With & Without Contrast    6. Atypical chest pain    Other orders  -     colestipol (COLESTID) 1 g tablet; Take 2 tablets by mouth 2 (Two) Times a Day.  Dispense: 120 tablet; Refill: 12        Patient not on aricept now, she has chronic diarrhea and so stopped;  Would hold off treatment for now until further evaluation.   Had neuropsych 1.5 years ago noted mild cognitive decline.  Will repeat as further decline noted.  Dementia on SLUMS testing today;  Will also check MRI, b12, TSH and RPR;   Consider neurology  referral (does see neurology for dystonia and botox injections, forward information).  Patient had fracture with fall and has had in past with unsteady gait.  She is not able to drive due to fracture and falls, currently home bound status.  Will order C with PT/OT to work on mobility related ADLS, fall reduction, gait training and independence with mobility.  She would also benefit from nursing to monitor cardiovascular status while undergoing cardiac evaluation by cardiology  Return in about 3 months (around 10/22/2021), or if symptoms worsen or fail to improve.

## 2021-07-22 NOTE — PATIENT INSTRUCTIONS
Advance Directive    Advance directives are legal documents that let you make choices ahead of time about your health care and medical treatment in case you become unable to communicate for yourself. Advance directives are a way for you to make known your wishes to family, friends, and health care providers. This can let others know about your end-of-life care if you become unable to communicate.  Discussing and writing advance directives should happen over time rather than all at once. Advance directives can be changed depending on your situation and what you want, even after you have signed the advance directives.  There are different types of advance directives, such as:  · Medical power of .  · Living will.  · Do not resuscitate (DNR) or do not attempt resuscitation (DNAR) order.  Health care proxy and medical power of   A health care proxy is also called a health care agent. This is a person who is appointed to make medical decisions for you in cases where you are unable to make the decisions yourself. Generally, people choose someone they know well and trust to represent their preferences. Make sure to ask this person for an agreement to act as your proxy. A proxy may have to exercise judgment in the event of a medical decision for which your wishes are not known.  A medical power of  is a legal document that names your health care proxy. Depending on the laws in your state, after the document is written, it may also need to be:  · Signed.  · Notarized.  · Dated.  · Copied.  · Witnessed.  · Incorporated into your medical record.  You may also want to appoint someone to manage your money in a situation in which you are unable to do so. This is called a durable power of  for finances. It is a separate legal document from the durable power of  for health care. You may choose the same person or someone different from your health care proxy to act as your agent in money  matters.  If you do not appoint a proxy, or if there is a concern that the proxy is not acting in your best interests, a court may appoint a guardian to act on your behalf.  Living will  A living will is a set of instructions that state your wishes about medical care when you cannot express them yourself. Health care providers should keep a copy of your living will in your medical record. You may want to give a copy to family members or friends. To alert caregivers in case of an emergency, you can place a card in your wallet to let them know that you have a living will and where they can find it. A living will is used if you become:  · Terminally ill.  · Disabled.  · Unable to communicate or make decisions.  Items to consider in your living will include:  · To use or not to use life-support equipment, such as dialysis machines and breathing machines (ventilators).  · A DNR or DNAR order. This tells health care providers not to use cardiopulmonary resuscitation (CPR) if breathing or heartbeat stops.  · To use or not to use tube feeding.  · To be given or not to be given food and fluids.  · Comfort (palliative) care when the goal becomes comfort rather than a cure.  · Donation of organs and tissues.  A living will does not give instructions for distributing your money and property if you should pass away.  DNR or DNAR  A DNR or DNAR order is a request not to have CPR in the event that your heart stops beating or you stop breathing. If a DNR or DNAR order has not been made and shared, a health care provider will try to help any patient whose heart has stopped or who has stopped breathing. If you plan to have surgery, talk with your health care provider about how your DNR or DNAR order will be followed if problems occur.  What if I do not have an advance directive?  If you do not have an advance directive, some states assign family decision makers to act on your behalf based on how closely you are related to them. Each  state has its own laws about advance directives. You may want to check with your health care provider, , or state representative about the laws in your state.  Summary  · Advance directives are the legal documents that allow you to make choices ahead of time about your health care and medical treatment in case you become unable to tell others about your care.  · The process of discussing and writing advance directives should happen over time. You can change the advance directives, even after you have signed them.  · Advance directives include DNR or DNAR orders, living crystal, and designating an agent as your medical power of .  This information is not intended to replace advice given to you by your health care provider. Make sure you discuss any questions you have with your health care provider.  Document Revised: 01/28/2021 Document Reviewed: 07/16/2020  Elsevier Patient Education © 2021 [employer]      Fall Prevention in the Home, Adult  Falls can cause injuries and can affect people from all age groups. There are many simple things that you can do to make your home safe and to help prevent falls. Ask for help when making these changes, if needed.  What actions can I take to prevent falls?  General instructions  · Use good lighting in all rooms. Replace any light bulbs that burn out.  · Turn on lights if it is dark. Use night-lights.  · Place frequently used items in easy-to-reach places. Lower the shelves around your home if necessary.  · Set up furniture so that there are clear paths around it. Avoid moving your furniture around.  · Remove throw rugs and other tripping hazards from the floor.  · Avoid walking on wet floors.  · Fix any uneven floor surfaces.  · Add color or contrast paint or tape to grab bars and handrails in your home. Place contrasting color strips on the first and last steps of stairways.  · When you use a stepladder, make sure that it is completely opened and that the sides  are firmly locked. Have someone hold the ladder while you are using it. Do not climb a closed stepladder.  · Be aware of any and all pets.  What can I do in the bathroom?         · Keep the floor dry. Immediately clean up any water that spills onto the floor.  · Remove soap buildup in the tub or shower on a regular basis.  · Use non-skid mats or decals on the floor of the tub or shower.  · Attach bath mats securely with double-sided, non-slip rug tape.  · If you need to sit down while you are in the shower, use a plastic, non-slip stool.  · Install grab bars by the toilet and in the tub and shower. Do not use towel bars as grab bars.  What can I do in the bedroom?  · Make sure that a bedside light is easy to reach.  · Do not use oversized bedding that drapes onto the floor.  · Have a firm chair that has side arms to use for getting dressed.  What can I do in the kitchen?  · Clean up any spills right away.  · If you need to reach for something above you, use a sturdy step stool that has a grab bar.  · Keep electrical cables out of the way.  · Do not use floor polish or wax that makes floors slippery. If you must use wax, make sure that it is non-skid floor wax.  What can I do in the stairways?  · Do not leave any items on the stairs.  · Make sure that you have a light switch at the top of the stairs and the bottom of the stairs. Have them installed if you do not have them.  · Make sure that there are handrails on both sides of the stairs. Fix handrails that are broken or loose. Make sure that handrails are as long as the stairways.  · Install non-slip stair treads on all stairs in your home.  · Avoid having throw rugs at the top or bottom of stairways, or secure the rugs with carpet tape to prevent them from moving.  · Choose a carpet design that does not hide the edge of steps on the stairway.  · Check any carpeting to make sure that it is firmly attached to the stairs. Fix any carpet that is loose or worn.  What  can I do on the outside of my home?  · Use bright outdoor lighting.  · Regularly repair the edges of walkways and driveways and fix any cracks.  · Remove high doorway thresholds.  · Trim any shrubbery on the main path into your home.  · Regularly check that handrails are securely fastened and in good repair. Both sides of any steps should have handrails.  · Install guardrails along the edges of any raised decks or porches.  · Clear walkways of debris and clutter, including tools and rocks.  · Have leaves, snow, and ice cleared regularly.  · Use sand or salt on walkways during winter months.  · In the garage, clean up any spills right away, including grease or oil spills.  What other actions can I take?  · Wear closed-toe shoes that fit well and support your feet. Wear shoes that have rubber soles or low heels.  · Use mobility aids as needed, such as canes, walkers, scooters, and crutches.  · Review your medicines with your health care provider. Some medicines can cause dizziness or changes in blood pressure, which increase your risk of falling.  Talk with your health care provider about other ways that you can decrease your risk of falls. This may include working with a physical therapist or  to improve your strength, balance, and endurance.  Where to find more information  · Centers for Disease Control and Prevention, [place]: https://www.cdc.gov  · National [place] on Aging: https://bf0ttnq.marysol.nih.gov  Contact a health care provider if:  · You are afraid of falling at home.  · You feel weak, drowsy, or dizzy at home.  · You fall at home.  Summary  · There are many simple things that you can do to make your home safe and to help prevent falls.  · Ways to make your home safe include removing tripping hazards and installing grab bars in the bathroom.  · Ask for help when making these changes in your home.  This information is not intended to replace advice given to you by your health care provider. Make sure  you discuss any questions you have with your health care provider.  Document Revised: 11/30/2018 Document Reviewed: 08/02/2018  Elsevier Patient Education © 2021 [employer]vier Inc.

## 2021-07-28 LAB
ALBUMIN SERPL-MCNC: 4.2 G/DL (ref 3.7–4.7)
ALBUMIN/GLOB SERPL: 2 {RATIO} (ref 1.2–2.2)
ALP SERPL-CCNC: 69 IU/L (ref 48–121)
ALT SERPL-CCNC: 24 IU/L (ref 0–32)
AST SERPL-CCNC: 35 IU/L (ref 0–40)
BASOPHILS # BLD AUTO: 0 X10E3/UL (ref 0–0.2)
BASOPHILS NFR BLD AUTO: 1 %
BILIRUB SERPL-MCNC: 0.2 MG/DL (ref 0–1.2)
BUN SERPL-MCNC: 23 MG/DL (ref 8–27)
BUN/CREAT SERPL: 30 (ref 12–28)
CALCIUM SERPL-MCNC: 9 MG/DL (ref 8.7–10.3)
CHLORIDE SERPL-SCNC: 101 MMOL/L (ref 96–106)
CO2 SERPL-SCNC: 23 MMOL/L (ref 20–29)
CREAT SERPL-MCNC: 0.76 MG/DL (ref 0.57–1)
EOSINOPHIL # BLD AUTO: 0.1 X10E3/UL (ref 0–0.4)
EOSINOPHIL NFR BLD AUTO: 1 %
ERYTHROCYTE [DISTWIDTH] IN BLOOD BY AUTOMATED COUNT: 14.2 % (ref 11.7–15.4)
GLOBULIN SER CALC-MCNC: 2.1 G/DL (ref 1.5–4.5)
GLUCOSE SERPL-MCNC: 93 MG/DL (ref 65–99)
HCT VFR BLD AUTO: 40.6 % (ref 34–46.6)
HGB BLD-MCNC: 13 G/DL (ref 11.1–15.9)
IMM GRANULOCYTES # BLD AUTO: 0 X10E3/UL (ref 0–0.1)
IMM GRANULOCYTES NFR BLD AUTO: 0 %
LYMPHOCYTES # BLD AUTO: 1.5 X10E3/UL (ref 0.7–3.1)
LYMPHOCYTES NFR BLD AUTO: 22 %
MCH RBC QN AUTO: 31.3 PG (ref 26.6–33)
MCHC RBC AUTO-ENTMCNC: 32 G/DL (ref 31.5–35.7)
MCV RBC AUTO: 98 FL (ref 79–97)
MONOCYTES # BLD AUTO: 0.5 X10E3/UL (ref 0.1–0.9)
MONOCYTES NFR BLD AUTO: 8 %
NEUTROPHILS # BLD AUTO: 4.6 X10E3/UL (ref 1.4–7)
NEUTROPHILS NFR BLD AUTO: 68 %
PLATELET # BLD AUTO: 235 X10E3/UL (ref 150–450)
POTASSIUM SERPL-SCNC: 4.4 MMOL/L (ref 3.5–5.2)
PROT SERPL-MCNC: 6.3 G/DL (ref 6–8.5)
RBC # BLD AUTO: 4.16 X10E6/UL (ref 3.77–5.28)
RPR SER QL: NON REACTIVE
SODIUM SERPL-SCNC: 139 MMOL/L (ref 134–144)
TSH SERPL DL<=0.005 MIU/L-ACNC: 1.73 UIU/ML (ref 0.45–4.5)
VIT B12 SERPL-MCNC: 659 PG/ML (ref 232–1245)
WBC # BLD AUTO: 6.7 X10E3/UL (ref 3.4–10.8)

## 2021-07-30 ENCOUNTER — TELEPHONE (OUTPATIENT)
Dept: FAMILY MEDICINE CLINIC | Facility: CLINIC | Age: 76
End: 2021-07-30

## 2021-07-30 NOTE — TELEPHONE ENCOUNTER
I tried calling pt to let her know she needs to wait until she feels better. I tried calling pt to let her know this but her voicemail if full and can't accept messages.

## 2021-07-30 NOTE — TELEPHONE ENCOUNTER
Caller: Kesha Cha    Relationship to patient: Self    Best call back number: [phone]    Additional information or concerns: PATIENT WOULD LIKE TO BE ADVISED ON IF SHE SHOULD BE VACCINATED WHILE SHE IS NOT FEELING WELL, CONGESTED. PLEASE ADVISE

## 2021-08-03 ENCOUNTER — TELEPHONE (OUTPATIENT)
Dept: FAMILY MEDICINE CLINIC | Facility: CLINIC | Age: 76
End: 2021-08-03

## 2021-08-03 NOTE — TELEPHONE ENCOUNTER
Caller: Kesha Cha    Relationship: Self    Best call back number: [phone]    What was the call regarding: PATIENT WOULD LIKE TO DISCUSS THE COVID VACCINE WITH DR PA, SHE HAS QUESTIONS ABOUT IT BEFORE SHE GETS IT.     Do you require a callback: YES

## 2021-08-03 NOTE — TELEPHONE ENCOUNTER
I spoke with pt about getting covid vaccine. She thinks she will get either pfizer or moderna. I told pt to push fluids, start Tylenol 24 hours before vaccine and cont for 48 hours afterwards and as needed after that, vit D3 5000u daily, vit C daily. I gave pt Eduardo SAUCEDA in [place] phone number and pt will call to see if she needs appt or if she can just go there and get it. Let me know if I need to tell pt anything else.

## 2021-08-30 ENCOUNTER — HOSPITAL ENCOUNTER (OUTPATIENT)
Dept: MRI IMAGING | Facility: HOSPITAL | Age: 76
Discharge: HOME | End: 2021-08-30
Admitting: FAMILY MEDICINE

## 2021-08-30 PROCEDURE — 0 GADOBENATE DIMEGLUMINE 529 MG/ML SOLUTION: Performed by: FAMILY MEDICINE

## 2021-08-30 PROCEDURE — 70553 MRI BRAIN STEM W/O & W/DYE: CPT

## 2021-08-30 PROCEDURE — A9577 INJ MULTIHANCE: HCPCS | Performed by: FAMILY MEDICINE

## 2021-08-30 RX ADMIN — GADOBENATE DIMEGLUMINE 8 ML: 529 INJECTION, SOLUTION INTRAVENOUS at 11:30

## 2021-11-05 ENCOUNTER — OFFICE VISIT (OUTPATIENT)
Dept: FAMILY MEDICINE CLINIC | Facility: CLINIC | Age: 76
End: 2021-11-05

## 2021-11-05 VITALS
DIASTOLIC BLOOD PRESSURE: 78 MMHG | OXYGEN SATURATION: 96 % | SYSTOLIC BLOOD PRESSURE: 118 MMHG | HEART RATE: 71 BPM | WEIGHT: 100 LBS | BODY MASS INDEX: 16.66 KG/M2 | HEIGHT: 65 IN

## 2021-11-05 DIAGNOSIS — I25.10 CORONARY ARTERY DISEASE INVOLVING NATIVE CORONARY ARTERY OF NATIVE HEART WITHOUT ANGINA PECTORIS: ICD-10-CM

## 2021-11-05 DIAGNOSIS — I10 PRIMARY HYPERTENSION: ICD-10-CM

## 2021-11-05 DIAGNOSIS — Z78.0 MENOPAUSE: ICD-10-CM

## 2021-11-05 DIAGNOSIS — Z12.11 ENCOUNTER FOR COLORECTAL CANCER SCREENING: ICD-10-CM

## 2021-11-05 DIAGNOSIS — R41.89 COGNITIVE DECLINE: Primary | ICD-10-CM

## 2021-11-05 DIAGNOSIS — Z12.12 ENCOUNTER FOR COLORECTAL CANCER SCREENING: ICD-10-CM

## 2021-11-05 DIAGNOSIS — Z12.31 ENCOUNTER FOR SCREENING MAMMOGRAM FOR MALIGNANT NEOPLASM OF BREAST: ICD-10-CM

## 2021-11-05 PROCEDURE — 99214 OFFICE O/P EST MOD 30 MIN: CPT | Performed by: FAMILY MEDICINE

## 2021-11-05 RX ORDER — ASPIRIN 81 MG/1
81 TABLET ORAL DAILY
Qty: 90 TABLET | Refills: 3 | Status: SHIPPED | OUTPATIENT
Start: 2021-11-05 | End: 2022-06-07

## 2021-11-05 RX ORDER — ROSUVASTATIN CALCIUM 5 MG/1
5 TABLET, COATED ORAL NIGHTLY
Qty: 90 TABLET | Refills: 3 | Status: SHIPPED | OUTPATIENT
Start: 2021-11-05 | End: 2022-02-11 | Stop reason: HOSPADM

## 2021-11-05 RX ORDER — METOPROLOL SUCCINATE 25 MG/1
37.5 TABLET, EXTENDED RELEASE ORAL DAILY
COMMUNITY
Start: 2021-10-15

## 2021-11-05 RX ORDER — RIVASTIGMINE 4.6 MG/24H
1 PATCH, EXTENDED RELEASE TRANSDERMAL DAILY
Qty: 30 PATCH | Refills: 5 | Status: SHIPPED | OUTPATIENT
Start: 2021-11-05 | End: 2022-02-11

## 2021-11-05 NOTE — PROGRESS NOTES
Subjective   Kesha Cha is a 75 y.o. female. Presents today for   Chief Complaint   Patient presents with   • Memory Loss   • Hypertension   • Colonoscopy     needs orders   • mammo & dexa orders   • hep c screening       Memory Loss  This is a chronic problem. The current episode started more than 1 year ago. The problem occurs constantly. The problem has been unchanged. Pertinent negatives include no chest pain. Treatments tried: tried aricept but worsened chronic diarrhea.   Hypertension  This is a chronic problem. Associated symptoms include malaise/fatigue. Pertinent negatives include no chest pain, orthopnea, palpitations, peripheral edema, PND or shortness of breath. The current treatment provides moderate improvement. Hypertensive end-organ damage includes CAD/MI.       Review of Systems   Constitutional: Positive for malaise/fatigue.   Respiratory: Negative for shortness of breath.    Cardiovascular: Negative for chest pain, palpitations, orthopnea and PND.       Patient Active Problem List   Diagnosis   • Atopic rhinitis   • Acute otitis media   • Isolated cervical dystonia   • Radial styloid tenosynovitis   • Degeneration of intervertebral disc of lumbar region   • Disorder of jaw   • Diverticulosis of intestine   • Dyslipidemia   • Dysphagia   • Idiopathic torsion dystonia   • Post-menopausal osteoporosis   • Osteoporosis   • Palpitations   • Reactive airway disease   • Scoliosis   • Arthralgia of temporomandibular joint   • Upper respiratory tract infection   • Atypical chest pain   • Chest pain   • Dystonia   • Fall   • Closed nondisplaced fracture of right pubis (HCC)   • Closed fracture of sacrum (HCC)   • Elevated LFTs   • SOB (shortness of breath)   • Abnormal CT of the chest   • Coronary artery disease   • Hyperlipidemia   • PVC's (premature ventricular contractions)   • Diarrhea   • History of constipation   • Fecal soiling due to fecal incontinence   • Weight loss   • Hypertension  "      Social History     Socioeconomic History   • Marital status:    Tobacco Use   • Smoking status: Former Smoker     Types: Cigarettes     Start date: 1958     Quit date: 1969     Years since quittin.8   • Smokeless tobacco: Never Used   Substance and Sexual Activity   • Alcohol use: No   • Drug use: No       No Known Allergies    Current Outpatient Medications on File Prior to Visit   Medication Sig Dispense Refill   • colestipol (COLESTID) 1 g tablet Take 2 tablets by mouth 2 (Two) Times a Day. 120 tablet 12   • metoprolol succinate XL (TOPROL-XL) 25 MG 24 hr tablet Take 37.5 mg by mouth Daily.     • multivitamin with minerals (MULTIVITAMIN ADULT PO) Take 1 tablet by mouth Daily.     • onabotulinumtoxina (BOTOX) 100 UNITS reconstituted solution injection Inject as directed     • [DISCONTINUED] metoprolol succinate XL (TOPROL-XL) 25 MG 24 hr tablet Take 25 mg by mouth 2 (Two) Times a Day.       Current Facility-Administered Medications on File Prior to Visit   Medication Dose Route Frequency Provider Last Rate Last Admin   • [DISCONTINUED] denosumab (PROLIA) syringe 60 mg  60 mg Subcutaneous Once Felipe Owens DO           Objective   Vitals:    21 1024   BP: 118/78   Pulse: 71   SpO2: 96%   Weight: 45.4 kg (100 lb)   Height: 163.8 cm (64.5\")     Body mass index is 16.9 kg/m².    Physical Exam  Vitals and nursing note reviewed.   Constitutional:       Appearance: She is well-developed.   HENT:      Head: Normocephalic and atraumatic.   Neck:      Thyroid: No thyromegaly.      Vascular: No JVD.   Cardiovascular:      Rate and Rhythm: Normal rate and regular rhythm.      Heart sounds: Normal heart sounds. No murmur heard.  No friction rub. No gallop.    Pulmonary:      Effort: Pulmonary effort is normal. No respiratory distress.      Breath sounds: Normal breath sounds. No wheezing or rales.   Abdominal:      General: Bowel sounds are normal. There is no distension.      Palpations: " Abdomen is soft.      Tenderness: There is no abdominal tenderness. There is no guarding or rebound.   Musculoskeletal:      Cervical back: Neck supple.   Skin:     General: Skin is warm and dry.   Neurological:      Mental Status: She is alert.   Psychiatric:         Behavior: Behavior normal.       Component      Latest Ref Rng & Units 7/27/2021   WBC      3.4 - 10.8 x10E3/uL 6.7   RBC      3.77 - 5.28 x10E6/uL 4.16   Hemoglobin      11.1 - 15.9 g/dL 13.0   Hematocrit      34.0 - 46.6 % 40.6   MCV      79 - 97 fL 98 (H)   MCH      26.6 - 33.0 pg 31.3   MCHC      31.5 - 35.7 g/dL 32.0   RDW      11.7 - 15.4 % 14.2   Platelets      150 - 450 x10E3/uL 235   Neutrophil Rel %      Not Estab. % 68   Lymphocyte Rel %      Not Estab. % 22   Monocyte Rel %      Not Estab. % 8   Eosinophil Rel %      Not Estab. % 1   Basophil Rel %      Not Estab. % 1   Neutrophils Absolute      1.4 - 7.0 x10E3/uL 4.6   Lymphocytes Absolute      0.7 - 3.1 x10E3/uL 1.5   Monocytes Absolute      0.1 - 0.9 x10E3/uL 0.5   Eosinophils Absolute      0.0 - 0.4 x10E3/uL 0.1   Basophils Absolute      0.0 - 0.2 x10E3/uL 0.0   Immature Granulocyte Rel %      Not Estab. % 0   Immature Grans, Absolute      0.0 - 0.1 x10E3/uL 0.0   Glucose      65 - 99 mg/dL 93   BUN      8 - 27 mg/dL 23   Creatinine      0.57 - 1.00 mg/dL 0.76   eGFR Non African Am      >59 mL/min/1.73 76   eGFR African Am      >59 mL/min/1.73 88   BUN/Creatinine Ratio      12 - 28 30 (H)   Sodium      134 - 144 mmol/L 139   Potassium      3.5 - 5.2 mmol/L 4.4   Chloride      96 - 106 mmol/L 101   CO2      20 - 29 mmol/L 23   Calcium      8.7 - 10.3 mg/dL 9.0   Total Protein      6.0 - 8.5 g/dL 6.3   Albumin      3.7 - 4.7 g/dL 4.2   Globulin      1.5 - 4.5 g/dL 2.1   A/G Ratio      1.2 - 2.2 2.0   Total Bilirubin      0.0 - 1.2 mg/dL 0.2   Alkaline Phosphatase      48 - 121 IU/L 69   AST (SGOT)      0 - 40 IU/L 35   ALT (SGPT)      0 - 32 IU/L 24   TSH Baseline      0.450 - 4.500  uIU/mL 1.730   Vitamin B-12      232 - 1,245 pg/mL 659   RPR      Non Reactive Non Reactive     Study Result    Narrative & Impression   MRI OF THE BRAIN WITH AND WITHOUT CONTRAST      CLINICAL HISTORY: Memory loss. Following.     MRI of the brain is obtained with sagittal T1, axial pre and  postgadolinium T1, coronal postgadolinium T1, axial FLAIR, axial T2,  axial diffusion, and axial susceptibility weighted images.     FINDINGS:     The ventricles, sulci, and cisterns are age appropriate. There are no  abnormal areas of restricted diffusion. The midline intracranial anatomy  is unremarkable. Minor changes of chronic small vessel ischemic  phenomena are appreciated. The major intracranial flow related signal  voids are within normal limits. There are no abnormal areas of  susceptibility artifact. No abnormal areas of contrast enhancement are  appreciated.     IMPRESSION:     Essentially unremarkable MRI of the brain.     This report was finalized on 2021 12:31 PM by Dr. Roe Cota M.D.        REVIEWING YOUR TEST RESULTS IN [facility] IS NOT A SUBSTITUTE FOR DISCUSSING THOSE RESULTS WITH YOUR HEALTH CARE PROVIDER.   PLEASE CONTACT YOUR PROVIDER VIA [facility] TO DISCUSS ANY QUESTIONS OR CONCERNS YOU MAY HAVE REGARDING THESE TEST RESULTS.     RADIOLOGY REPORT     [facility]   UNIT/AGE/GENDER: M.CT  OP      AGE:75 Y          SEX:F   PATIENT NAME/:  DUARTE ELENAEDDI    1945   UNIT NUMBER:  [id number]   ACCOUNT NUMBER:  [id number]   ACCESSION NUMBER:  [id number]     EXAMINATION: CT scan of the chest without and with contrast.     DATE: 2021 at 0840.     COMPARISON: 2019.     CLINICAL HISTORY: Chest pain, palpitations, PVC's. Initial encounter.     TECHNIQUE: Thin section axial images through the mid and lower portions of the chest were obtained both before and after the dynamic bolus injection of 100 cc Omnipaque 350 as per the  cardiac CTA protocol. Additional multiplanar 2-D and 3-D   reconstructions were rendered and reviewed on a 3-D workstation.     CT scans at this facility use dose modulation, iterative reconstruction, and/or weight based dosing when appropriate to reduce radiation dose to as low as reasonably achievable.     COMMENT: This CTA study contains a dictated report by both Radiology and Cardiology. For the Cardiology report, please refer to the Imaging Tab in the EMR.     FINDINGS: The initial non-contrasted images show atherosclerotic calcifications of the coronary arteries and descending aorta. There are granulomatous calcifications in the left lung and left hilum. Degenerative changes are noted within the thoracic   spine.     Contrasted images demonstrate normal enhancement of the visualized mediastinal structures. The heart size is at the upper limits of normal, and the pericardium is unremarkable. No masses or lymphadenopathy are observed.     Lung windows again demonstrate chronic scarring and bronchiectasis in the right middle lobe comment as well as mild chronic scarring in the posterior aspect of the left lower lobe. There are no consolidative infiltrates, dominant parenchymal nodules, or   pleural effusions within the visualized portions of the chest.     Limited imaging of the upper abdomen is grossly unremarkable.     CALCIUM SCORING:        Left main coronary artery: 0        Left anterior descending coronary artery: 223        Circumflex coronary artery: 69.3        Right coronary artery: 92.8        Total Calcium Scorin     IMPRESSION:     The total calcium score is 385. The Computed Tomography of the coronary arteries detected definite, at least moderate atherosclerotic plaque burden. A calcium score of 101-400 indicates that non-obstructive CAD is highly likely, although   obstructive disease is possible. There is a significant (>4% per year) risk of future cardiac events.  [place] risk factor  modification, daily aspirin and statin use, and consider exercise testing.   Impression    1. Total calcium score 385   2. Normal coronary origins and courses.     3.  The mid left anterior descending artery has mixed plaque producing 50   to 70% stenosis probably closer to 50%.  The mid right coronary artery has   mixed plaque producing 50 to 70% stenosis -small segment of distal right   coronary artery cannot be adequately evaluated due to severe   misregistration artifact/double image artifact   Consider CT FFR to assess hemodynamic significance of intermediate lesions   noted in the mid right coronary artery and the LAD.           FFR-CT Report-     The mixed plaque in the mid LAD producing 50 to 70% stenosis has low   probability of lesion specific ischemia with a corresponding CT FFR value   of 0.85   The mixed plaque in the mid right coronary artery producing 50 to 70%   stenosis has low probability for lesion specific ischemia with a   corresponding CT FFR value of 0.93   Note that a small segment of the distal right coronary artery had double   image artifact due to misregistration but CT FFR values in the proximal   PDA and PLV branches are normal going no ischemia in the PDA and PLV   branches    Narrative      Assessment/Plan   Diagnoses and all orders for this visit:    1. Cognitive decline (Primary)  -     rivastigmine (EXELON) 4.6 MG/24HR patch; Place 1 patch on the skin as directed by provider Daily.  Dispense: 30 patch; Refill: 5    2. Primary hypertension    3. Coronary artery disease involving native coronary artery of native heart without angina pectoris  -     aspirin (aspirin) 81 MG EC tablet; Take 1 tablet by mouth Daily.  Dispense: 90 tablet; Refill: 3  -     rosuvastatin (Crestor) 5 MG tablet; Take 1 tablet by mouth Every Night.  Dispense: 90 tablet; Refill: 3    4. Encounter for colorectal cancer screening  -     Cologuard - Stool, Per Rectum    5. Encounter for screening mammogram for  malignant neoplasm of breast  -     Mammo Screening Bilateral With CAD; Future    6. Menopause  -     DEXA Bone Density Axial; Future    -declines cscope but interested in cologuard  -up date mammo and dexa as due  -will try exelon for memory loss; intoleratn aricept  MRI and labs ok;  neuropsych noted mild cognitive decline  Reviewed recent cardiac studies and higher calcium scores;  Will add asa and statin to regimen;  Continue beta blocker as per cardiology       -Follow up: 3 months and prn

## 2021-11-05 NOTE — PATIENT INSTRUCTIONS

## 2021-11-15 ENCOUNTER — TELEPHONE (OUTPATIENT)
Dept: FAMILY MEDICINE CLINIC | Facility: CLINIC | Age: 76
End: 2021-11-15

## 2021-11-15 NOTE — TELEPHONE ENCOUNTER
Caller: Kesha Cha    Relationship to patient: Self    Best call back number: [phone]    Patient is needing: PATIENT STATES SHE DID NOT  PRESCRIPTION FOR EXELON. PATIENT STATES IT WAS $166 FOR A ONE MONTH SUPPLY AND SHE IS WANTING TO KNOW IF THIS MEDICATION RESTORES MEMORY THAT HAS ALREADY BEEN LOST OR PREVENTS FURTHER MEMORY LOSS. PATIENT ALSO WANTING TO KNOW IF DR. PA PREFERS PATCHES OVER THE PILLS OR VICE VERSA.

## 2021-11-15 NOTE — TELEPHONE ENCOUNTER
Patches are better tolerated, but can use pills too.  They do not restore memory, they slow decline of memory.  I would suggest try pills and see if tolerate.  If do not, can stop.  Exelon 1.5mg 1 po bid x 14 days with food, then 3mg po bid  #60 2 ref

## 2021-11-23 ENCOUNTER — TELEPHONE (OUTPATIENT)
Dept: FAMILY MEDICINE CLINIC | Facility: CLINIC | Age: 76
End: 2021-11-23

## 2021-11-23 DIAGNOSIS — R92.8 ABNORMAL MAMMOGRAM OF BOTH BREASTS: Primary | ICD-10-CM

## 2021-11-23 DIAGNOSIS — N64.89 BREAST ASYMMETRY IN FEMALE: ICD-10-CM

## 2021-11-23 NOTE — TELEPHONE ENCOUNTER
Caller: Kesha Cha    Relationship: Self    Best call back number: [phone]     What is the best time to reach you: ANYTIME    Who are you requesting to speak with (clinical staff, provider,  specific staff member): LINDA PA    What was the call regarding: PATIENT STATES SHE HAD A MAJOR NOSE BLEED. PATIENT STATES IT BLEED A LOT YESTERDAY. PATIENT IS WANTING RECOMMENDATIONS OR IF SHE NEEDS TO COME INTO THE OFFICE.     Do you require a callback: YES

## 2021-11-23 NOTE — TELEPHONE ENCOUNTER
Per RRJ, pt is to start neosynephrine 2 sprays per nostril BID for 3 days then stop. Tomorrow start nasal saline rinses before neosynephrine for 3 days then stop. At bedtime take a qtip with a pea size amount of Vaseline on it and rub just inside the nostril. Pt is aware

## 2021-11-29 ENCOUNTER — TELEPHONE (OUTPATIENT)
Dept: FAMILY MEDICINE CLINIC | Facility: CLINIC | Age: 76
End: 2021-11-29

## 2021-11-29 DIAGNOSIS — N63.10 BREAST MASS, RIGHT: Primary | ICD-10-CM

## 2021-11-29 DIAGNOSIS — R92.1 MAMMOGRAPHIC CALCIFICATION FOUND ON DIAGNOSTIC IMAGING OF BREAST: ICD-10-CM

## 2021-11-29 NOTE — TELEPHONE ENCOUNTER
DXP called to say patient had abnormal Mammo and needs a US guided Right Breast Biopsy and a Mammo Diagnostic right for clip placement.

## 2021-12-03 ENCOUNTER — TELEPHONE (OUTPATIENT)
Dept: FAMILY MEDICINE CLINIC | Facility: CLINIC | Age: 76
End: 2021-12-03

## 2021-12-03 DIAGNOSIS — N63.20 BREAST MASS, LEFT: ICD-10-CM

## 2021-12-03 DIAGNOSIS — R92.1 MAMMOGRAPHIC CALCIFICATION FOUND ON DIAGNOSTIC IMAGING OF BREAST: Primary | ICD-10-CM

## 2021-12-03 NOTE — TELEPHONE ENCOUNTER
DXP needs orders for US guided left breast biopsy and diagnostic left breast mammogram. They received orders for the right breast however patient has abnormalities of both breasts and additional orders are needed. Please fax to dxp. Her appointment is 12/7/21.

## 2021-12-08 DIAGNOSIS — N63.10 BREAST MASS, RIGHT: ICD-10-CM

## 2021-12-08 DIAGNOSIS — R92.1 MAMMOGRAPHIC CALCIFICATION FOUND ON DIAGNOSTIC IMAGING OF BREAST: Primary | ICD-10-CM

## 2021-12-09 ENCOUNTER — TELEPHONE (OUTPATIENT)
Dept: FAMILY MEDICINE CLINIC | Facility: CLINIC | Age: 76
End: 2021-12-09

## 2021-12-09 NOTE — TELEPHONE ENCOUNTER
Caller: Kesha Cha    Relationship: Self    Best call back number:[phone]     Caller requesting test results:      What test was performed: BREAST BIOPSY     When was the test performed: 12/07/21    Where was the test performed: [facility]     Additional notes: PATIENT CALLING WANTING TO KNOW IF THE RESULTS ARE IN

## 2021-12-17 RX ORDER — MONTELUKAST SODIUM 4 MG/1
TABLET, CHEWABLE ORAL
Qty: 120 TABLET | Refills: 5 | Status: SHIPPED | OUTPATIENT
Start: 2021-12-17 | End: 2022-03-10

## 2022-02-09 NOTE — TELEPHONE ENCOUNTER
9/29/20   Patient's blood sugar is 336 for dinner. Per Dr. Zavaleta give Lantus 50 now, continue SS insulin, hold scheduled 15 units.

## 2022-02-11 ENCOUNTER — OFFICE VISIT (OUTPATIENT)
Dept: FAMILY MEDICINE CLINIC | Facility: CLINIC | Age: 77
End: 2022-02-11

## 2022-02-11 VITALS
DIASTOLIC BLOOD PRESSURE: 60 MMHG | SYSTOLIC BLOOD PRESSURE: 100 MMHG | WEIGHT: 102 LBS | BODY MASS INDEX: 17 KG/M2 | HEART RATE: 96 BPM | HEIGHT: 65 IN | OXYGEN SATURATION: 98 %

## 2022-02-11 DIAGNOSIS — G24.3 ISOLATED CERVICAL DYSTONIA: ICD-10-CM

## 2022-02-11 DIAGNOSIS — I10 PRIMARY HYPERTENSION: Primary | ICD-10-CM

## 2022-02-11 DIAGNOSIS — J30.89 NON-SEASONAL ALLERGIC RHINITIS DUE TO OTHER ALLERGIC TRIGGER: ICD-10-CM

## 2022-02-11 PROCEDURE — 99213 OFFICE O/P EST LOW 20 MIN: CPT | Performed by: FAMILY MEDICINE

## 2022-02-11 RX ORDER — LORATADINE 10 MG/1
10 TABLET ORAL DAILY
Qty: 90 TABLET | Refills: 3 | Status: SHIPPED | OUTPATIENT
Start: 2022-02-11 | End: 2022-06-07 | Stop reason: SDUPTHER

## 2022-02-11 NOTE — PROGRESS NOTES
Subjective   Kesha Cha is a 76 y.o. female. Presents today for   Chief Complaint   Patient presents with   • Hypertension   • Cough       Hypertension  This is a chronic problem. The current episode started more than 1 year ago. The problem is unchanged. The problem is controlled. Pertinent negatives include no orthopnea, palpitations, peripheral edema, PND or shortness of breath. The current treatment provides moderate improvement.   Cough  This is a chronic problem. The current episode started more than 1 month ago. The problem has been unchanged. The problem occurs constantly. The cough is productive of sputum. Associated symptoms include nasal congestion. Pertinent negatives include no chills, fever, heartburn, hemoptysis, postnasal drip, sore throat or shortness of breath. She has tried nothing for the symptoms. The treatment provided no relief. There is no history of asthma.     Has cervical dystonia and sees neurology for botox injection.     Review of Systems   Constitutional: Negative for chills and fever.   HENT: Negative for postnasal drip and sore throat.    Respiratory: Positive for cough. Negative for hemoptysis and shortness of breath.    Cardiovascular: Negative for palpitations, orthopnea and PND.   Gastrointestinal: Negative for heartburn.       Patient Active Problem List   Diagnosis   • Atopic rhinitis   • Acute otitis media   • Isolated cervical dystonia   • Radial styloid tenosynovitis   • Degeneration of intervertebral disc of lumbar region   • Disorder of jaw   • Diverticulosis of intestine   • Dyslipidemia   • Dysphagia   • Idiopathic torsion dystonia   • Post-menopausal osteoporosis   • Osteoporosis   • Palpitations   • Scoliosis   • Arthralgia of temporomandibular joint   • Upper respiratory tract infection   • Atypical chest pain   • Chest pain   • Dystonia   • Fall   • Closed nondisplaced fracture of right pubis (HCC)   • Closed fracture of sacrum (HCC)   • Elevated LFTs   • SOB  "(shortness of breath)   • Abnormal CT of the chest   • Coronary artery disease   • Hyperlipidemia   • PVC's (premature ventricular contractions)   • Diarrhea   • History of constipation   • Fecal soiling due to fecal incontinence   • Weight loss   • Hypertension       Social History     Socioeconomic History   • Marital status:    Tobacco Use   • Smoking status: Former Smoker     Types: Cigarettes     Start date: 1958     Quit date: 1969     Years since quittin.1   • Smokeless tobacco: Never Used   Substance and Sexual Activity   • Alcohol use: No   • Drug use: No       No Known Allergies    Current Outpatient Medications on File Prior to Visit   Medication Sig Dispense Refill   • colestipol (COLESTID) 1 g tablet Take 2 tablets by mouth twice daily 120 tablet 5   • metoprolol succinate XL (TOPROL-XL) 25 MG 24 hr tablet Take 37.5 mg by mouth Daily.     • multivitamin with minerals (MULTIVITAMIN ADULT PO) Take 1 tablet by mouth Daily.     • onabotulinumtoxina (BOTOX) 100 UNITS reconstituted solution injection Inject as directed     • aspirin (aspirin) 81 MG EC tablet Take 1 tablet by mouth Daily. 90 tablet 3   • [DISCONTINUED] rivastigmine (EXELON) 4.6 MG/24HR patch Place 1 patch on the skin as directed by provider Daily. 30 patch 5   • [DISCONTINUED] rosuvastatin (Crestor) 5 MG tablet Take 1 tablet by mouth Every Night. 90 tablet 3     No current facility-administered medications on file prior to visit.       Objective   Vitals:    22 1038   BP: 100/60   Pulse: 96   SpO2: 98%   Weight: 46.3 kg (102 lb)   Height: 163.8 cm (64.5\")     Body mass index is 17.24 kg/m².    Physical Exam  Vitals and nursing note reviewed.   Constitutional:       Appearance: She is well-developed.   HENT:      Head: Normocephalic and atraumatic.   Neck:      Thyroid: No thyromegaly.      Vascular: No JVD.   Cardiovascular:      Rate and Rhythm: Normal rate and regular rhythm.      Heart sounds: Normal heart sounds. " No murmur heard.  No friction rub. No gallop.    Pulmonary:      Effort: Pulmonary effort is normal. No respiratory distress.      Breath sounds: Normal breath sounds. No wheezing or rales.   Abdominal:      General: Bowel sounds are normal. There is no distension.      Palpations: Abdomen is soft.      Tenderness: There is no abdominal tenderness. There is no guarding or rebound.   Musculoskeletal:      Cervical back: Neck supple.   Skin:     General: Skin is warm and dry.   Neurological:      Mental Status: She is alert.   Psychiatric:         Behavior: Behavior normal.         Assessment/Plan   Diagnoses and all orders for this visit:    1. Primary hypertension (Primary)    2. Isolated cervical dystonia    3. Non-seasonal allergic rhinitis due to other allergic trigger  -     loratadine (CLARITIN) 10 MG tablet; Take 1 tablet by mouth Daily.  Dispense: 90 tablet; Refill: 3    -hypertension - controlled, continue medications  See neurology as doing   Will try H1 blocker to see if helps         -Follow up: 6 months and prn

## 2022-02-17 ENCOUNTER — TELEPHONE (OUTPATIENT)
Dept: FAMILY MEDICINE CLINIC | Facility: CLINIC | Age: 77
End: 2022-02-17

## 2022-02-17 NOTE — TELEPHONE ENCOUNTER
Caller: Kesha Cha    Relationship: Self    Best call back number: [phone]    Who are you requesting to speak with (clinical staff, provider,  specific staff member): NURSE    What was the call regarding: PATIENT HAD A BIOPSY OF HER BREAST IN December AND WAS WANTING TO KNOW IF DR. PA HAD THE RESULTS OF IT. PLEASE CALL.     Do you require a callback: YES

## 2022-03-10 RX ORDER — MONTELUKAST SODIUM 4 MG/1
TABLET, CHEWABLE ORAL
Qty: 60 TABLET | Refills: 5 | Status: SHIPPED | OUTPATIENT
Start: 2022-03-10 | End: 2022-05-04 | Stop reason: SDUPTHER

## 2022-03-22 ENCOUNTER — TELEPHONE (OUTPATIENT)
Dept: FAMILY MEDICINE CLINIC | Facility: CLINIC | Age: 77
End: 2022-03-22

## 2022-03-22 NOTE — TELEPHONE ENCOUNTER
PATIENT CALLED TO REQUEST A REFILL OF HER MEMORY LOSS MEDICATION Rivastigmine 6 MG CAPSULES. TAKE 2 TIMES A DAY    PLEASE ADVISE    [phone]

## 2022-03-23 RX ORDER — RIVASTIGMINE TARTRATE 6 MG/1
6 CAPSULE ORAL 2 TIMES DAILY
Qty: 180 CAPSULE | Refills: 1 | Status: SHIPPED | OUTPATIENT
Start: 2022-03-23 | End: 2022-06-07

## 2022-05-04 RX ORDER — MONTELUKAST SODIUM 4 MG/1
2 TABLET, CHEWABLE ORAL 2 TIMES DAILY
Qty: 60 TABLET | Refills: 0 | Status: SHIPPED | OUTPATIENT
Start: 2022-05-04 | End: 2022-06-02 | Stop reason: SDUPTHER

## 2022-05-04 NOTE — TELEPHONE ENCOUNTER
Caller:     Best call back number:    Kesha Cha (Self) [phone] (H)         Requested Prescriptions:   Requested Prescriptions     Pending Prescriptions Disp Refills   • colestipol (COLESTID) 1 g tablet 60 tablet 5     Sig: Take 2 tablets by mouth 2 (Two) Times a Day.        Pharmacy where request should be sent: [id number] [address] [facility] AT [facility] [phone] [facility] [phone]      Additional details provided by patient:     Does the patient have less than a 3 day supply:  [] Yes  [x] No    Triston Vance Rep   05/04/22 13:58 EDT

## 2022-06-02 RX ORDER — MONTELUKAST SODIUM 4 MG/1
2 TABLET, CHEWABLE ORAL 2 TIMES DAILY
Qty: 60 TABLET | Refills: 0 | Status: SHIPPED | OUTPATIENT
Start: 2022-06-02 | End: 2022-08-24

## 2022-06-02 NOTE — TELEPHONE ENCOUNTER
Caller: Kesha Cha    Relationship: Self    Best call back number: [phone]    Requested Prescriptions:   Requested Prescriptions     Pending Prescriptions Disp Refills   • colestipol (COLESTID) 1 g tablet 60 tablet 0     Sig: Take 2 tablets by mouth 2 (Two) Times a Day.    REQUESTING QUANTITY  TABLETS    Pharmacy where request should be sent: [address] [phone] [facility] [phone] [facility]     Additional details provided by patient: PATIENT DOESN'T HAVE ENOUGH TO LAST THROUGH WEEKEND    Does the patient have less than a 3 day supply:  [x] Yes  [] No    Triston RANDOLPH Rep   06/02/22 14:19 EDT

## 2022-06-07 ENCOUNTER — OFFICE VISIT (OUTPATIENT)
Dept: FAMILY MEDICINE CLINIC | Facility: CLINIC | Age: 77
End: 2022-06-07

## 2022-06-07 VITALS
HEART RATE: 70 BPM | SYSTOLIC BLOOD PRESSURE: 110 MMHG | DIASTOLIC BLOOD PRESSURE: 60 MMHG | WEIGHT: 98 LBS | HEIGHT: 64 IN | BODY MASS INDEX: 16.73 KG/M2 | RESPIRATION RATE: 20 BRPM | OXYGEN SATURATION: 98 %

## 2022-06-07 DIAGNOSIS — J30.89 NON-SEASONAL ALLERGIC RHINITIS DUE TO OTHER ALLERGIC TRIGGER: ICD-10-CM

## 2022-06-07 DIAGNOSIS — I10 PRIMARY HYPERTENSION: ICD-10-CM

## 2022-06-07 DIAGNOSIS — R15.2 FECAL URGENCY: ICD-10-CM

## 2022-06-07 DIAGNOSIS — R41.89 COGNITIVE DECLINE: ICD-10-CM

## 2022-06-07 DIAGNOSIS — K52.9 CHRONIC DIARRHEA: ICD-10-CM

## 2022-06-07 DIAGNOSIS — J30.1 SEASONAL ALLERGIC RHINITIS DUE TO POLLEN: Primary | ICD-10-CM

## 2022-06-07 PROCEDURE — 99213 OFFICE O/P EST LOW 20 MIN: CPT | Performed by: FAMILY MEDICINE

## 2022-06-07 RX ORDER — LORATADINE 10 MG/1
10 TABLET ORAL DAILY
Qty: 90 TABLET | Refills: 3 | Status: SHIPPED | OUTPATIENT
Start: 2022-06-07 | End: 2022-08-02

## 2022-06-07 RX ORDER — METHYLPREDNISOLONE 4 MG/1
TABLET ORAL
Qty: 21 TABLET | Refills: 0 | Status: SHIPPED | OUTPATIENT
Start: 2022-06-07 | End: 2022-08-02

## 2022-06-07 RX ORDER — MEMANTINE HYDROCHLORIDE 10 MG/1
TABLET ORAL
Qty: 39 TABLET | Refills: 0 | Status: SHIPPED | OUTPATIENT
Start: 2022-06-07 | End: 2022-08-02

## 2022-06-07 RX ORDER — MEMANTINE HYDROCHLORIDE 10 MG/1
TABLET ORAL
Qty: 39 TABLET | Refills: 0 | Status: SHIPPED | OUTPATIENT
Start: 2022-06-07 | End: 2022-06-07 | Stop reason: SDUPTHER

## 2022-06-07 RX ORDER — FLUTICASONE PROPIONATE 50 MCG
2 SPRAY, SUSPENSION (ML) NASAL DAILY
Qty: 16 G | Refills: 12 | Status: SHIPPED | OUTPATIENT
Start: 2022-06-07 | End: 2022-08-02

## 2022-06-07 NOTE — PROGRESS NOTES
Subjective   Kesha Cha is a 76 y.o. female. Presents today for   Chief Complaint   Patient presents with   • Fatigue   • Hypertension   • cognitive decline   • Med Refill     Discuss memory pills        History of Present Illness  Patient 77 y/o with htn, mild cognitive decline per hx and has had crhonic diarrhea;  Has fecal urgency and has to be careful what eats.  Colestipol helps with this as long as careful what eats.   Memory loss exelon patches not covered, intolerant capsules, like try something else;  Has nasal congestion, sneezng;  No cough;  Has allergies;      Review of Systems   Gastrointestinal: Positive for diarrhea. Negative for abdominal pain.   Psychiatric/Behavioral: Positive for decreased concentration.       Patient Active Problem List   Diagnosis   • Atopic rhinitis   • Acute otitis media   • Isolated cervical dystonia   • Radial styloid tenosynovitis   • Degeneration of intervertebral disc of lumbar region   • Disorder of jaw   • Diverticulosis of intestine   • Dyslipidemia   • Dysphagia   • Idiopathic torsion dystonia   • Post-menopausal osteoporosis   • Osteoporosis   • Palpitations   • Scoliosis   • Arthralgia of temporomandibular joint   • Upper respiratory tract infection   • Atypical chest pain   • Chest pain   • Dystonia   • Fall   • Closed nondisplaced fracture of right pubis (HCC)   • Closed fracture of sacrum (HCC)   • Elevated LFTs   • SOB (shortness of breath)   • Abnormal CT of the chest   • Coronary artery disease   • Hyperlipidemia   • PVC's (premature ventricular contractions)   • Diarrhea   • History of constipation   • Fecal soiling due to fecal incontinence   • Weight loss   • Hypertension       Social History     Socioeconomic History   • Marital status:    Tobacco Use   • Smoking status: Former Smoker     Types: Cigarettes     Start date: 1958     Quit date: 1969     Years since quittin.4   • Smokeless tobacco: Never Used   Vaping Use   • Vaping  "Use: Never used   Substance and Sexual Activity   • Alcohol use: No   • Drug use: No   • Sexual activity: Not Currently     Partners: Male       No Known Allergies    Current Outpatient Medications on File Prior to Visit   Medication Sig Dispense Refill   • colestipol (COLESTID) 1 g tablet Take 2 tablets by mouth 2 (Two) Times a Day. 60 tablet 0   • loratadine (CLARITIN) 10 MG tablet Take 1 tablet by mouth Daily. 90 tablet 3   • metoprolol succinate XL (TOPROL-XL) 25 MG 24 hr tablet Take 37.5 mg by mouth Daily.     • multivitamin with minerals tablet tablet Take 1 tablet by mouth Daily.     • onabotulinumtoxina (BOTOX) 100 UNITS reconstituted solution injection Inject as directed     • [DISCONTINUED] aspirin (aspirin) 81 MG EC tablet Take 1 tablet by mouth Daily. 90 tablet 3   • [DISCONTINUED] rivastigmine (EXELON) 6 MG capsule Take 1 capsule by mouth 2 (Two) Times a Day. 180 capsule 1     No current facility-administered medications on file prior to visit.       Objective   Vitals:    06/07/22 1612   BP: 110/60   BP Location: Right arm   Patient Position: Sitting   Cuff Size: Adult   Pulse: 70   Resp: 20   SpO2: 98%   Weight: 44.5 kg (98 lb)   Height: 163.8 cm (64.49\")   PainSc: 0-No pain     Body mass index is 16.57 kg/m².    Physical Exam  Vitals and nursing note reviewed.   Constitutional:       Appearance: She is well-developed.   HENT:      Head: Normocephalic and atraumatic.   Neck:      Thyroid: No thyromegaly.      Vascular: No JVD.   Cardiovascular:      Rate and Rhythm: Normal rate and regular rhythm.      Heart sounds: Normal heart sounds. No murmur heard.    No friction rub. No gallop.   Pulmonary:      Effort: Pulmonary effort is normal. No respiratory distress.      Breath sounds: Normal breath sounds. No wheezing or rales.   Abdominal:      General: Bowel sounds are normal. There is no distension.      Palpations: Abdomen is soft.      Tenderness: There is no abdominal tenderness. There is no " guarding or rebound.   Musculoskeletal:      Cervical back: Neck supple.   Skin:     General: Skin is warm and dry.   Neurological:      Mental Status: She is alert.   Psychiatric:         Behavior: Behavior normal.         Assessment & Plan   Diagnoses and all orders for this visit:    1. Seasonal allergic rhinitis due to pollen (Primary)  -     methylPREDNISolone (MEDROL) 4 MG dose pack; Take as directed on package instructions.  Dispense: 21 tablet; Refill: 0    2. Cognitive decline  -     memantine (NAMENDA) 10 MG tablet; 1/2 po daily x 7d, then 1/2 po bid x7d, then 1/2 am and 1 pm x7d then 1 po bid call for refill when ready  Dispense: 39 tablet; Refill: 0    3. Primary hypertension    4. Non-seasonal allergic rhinitis due to other allergic trigger  -     loratadine (CLARITIN) 10 MG tablet; Take 1 tablet by mouth Daily.  Dispense: 90 tablet; Refill: 3  -     fluticasone (Flonase) 50 MCG/ACT nasal spray; 2 sprays into the nostril(s) as directed by provider Daily.  Dispense: 16 g; Refill: 12    5. Chronic diarrhea    6. Fecal urgency    continue colestipol  Try meds for allergeis  Start namenda for memory           -Follow up: 6 months and prn

## 2022-08-02 ENCOUNTER — OFFICE VISIT (OUTPATIENT)
Dept: FAMILY MEDICINE CLINIC | Facility: CLINIC | Age: 77
End: 2022-08-02

## 2022-08-02 VITALS
HEART RATE: 64 BPM | HEIGHT: 64 IN | DIASTOLIC BLOOD PRESSURE: 68 MMHG | OXYGEN SATURATION: 98 % | WEIGHT: 98 LBS | SYSTOLIC BLOOD PRESSURE: 104 MMHG | BODY MASS INDEX: 16.73 KG/M2

## 2022-08-02 DIAGNOSIS — Z79.899 DRUG THERAPY: ICD-10-CM

## 2022-08-02 DIAGNOSIS — Z00.00 MEDICARE ANNUAL WELLNESS VISIT, SUBSEQUENT: Primary | ICD-10-CM

## 2022-08-02 DIAGNOSIS — I10 PRIMARY HYPERTENSION: ICD-10-CM

## 2022-08-02 DIAGNOSIS — E78.2 MIXED HYPERLIPIDEMIA: ICD-10-CM

## 2022-08-02 DIAGNOSIS — E55.9 VITAMIN D DEFICIENCY: ICD-10-CM

## 2022-08-02 DIAGNOSIS — J30.89 NON-SEASONAL ALLERGIC RHINITIS DUE TO OTHER ALLERGIC TRIGGER: ICD-10-CM

## 2022-08-02 PROCEDURE — G0439 PPPS, SUBSEQ VISIT: HCPCS | Performed by: FAMILY MEDICINE

## 2022-08-02 PROCEDURE — 99213 OFFICE O/P EST LOW 20 MIN: CPT | Performed by: FAMILY MEDICINE

## 2022-08-02 PROCEDURE — 1126F AMNT PAIN NOTED NONE PRSNT: CPT | Performed by: FAMILY MEDICINE

## 2022-08-02 PROCEDURE — 1159F MED LIST DOCD IN RCRD: CPT | Performed by: FAMILY MEDICINE

## 2022-08-02 PROCEDURE — 1170F FXNL STATUS ASSESSED: CPT | Performed by: FAMILY MEDICINE

## 2022-08-02 RX ORDER — LORATADINE 10 MG/1
10 CAPSULE, LIQUID FILLED ORAL DAILY
Qty: 90 EACH | Refills: 3 | Status: SHIPPED | OUTPATIENT
Start: 2022-08-02

## 2022-08-02 NOTE — PROGRESS NOTES
QUICK REFERENCE INFORMATION:  The ABCs of the Annual Wellness Visit    Subsequent Medicare Wellness Visit    HEALTH RISK ASSESSMENT    1945    Recent Hospitalizations:  No hospitalization(s) within the last year..        Current Medical Providers:  Patient Care Team:  Felipe Owens DO as PCP - General        Smoking Status:  Social History     Tobacco Use   Smoking Status Former Smoker   • Types: Cigarettes   • Start date: 1958   • Quit date: 1969   • Years since quittin.6   Smokeless Tobacco Never Used       Alcohol Consumption:  Social History     Substance and Sexual Activity   Alcohol Use No       Depression Screen:   PHQ-2/PHQ-9 Depression Screening 2022   Retired PHQ-9 Total Score -   Retired Total Score -   Little Interest or Pleasure in Doing Things 0-->not at all   Feeling Down, Depressed or Hopeless 0-->not at all   PHQ-9: Brief Depression Severity Measure Score 0       Health Habits and Functional and Cognitive Screening:  Functional & Cognitive Status 2022   Do you have difficulty preparing food and eating? No   Do you have difficulty bathing yourself, getting dressed or grooming yourself? No   Do you have difficulty using the toilet? No   Do you have difficulty moving around from place to place? No   Do you have trouble with steps or getting out of a bed or a chair? No   Current Diet Well Balanced Diet   Dental Exam Up to date   Eye Exam Not up to date   Exercise (times per week) 0 times per week   Current Exercises Include No Regular Exercise   Current Exercise Activities Include -   Do you need help using the phone?  No   Are you deaf or do you have serious difficulty hearing?  No   Do you need help with transportation? No   Do you need help shopping? No   Do you need help preparing meals?  No   Do you need help with housework?  No   Do you need help with laundry? No   Do you need help taking your medications? No   Do you need help managing money? No   Do you ever drive  or ride in a car without wearing a seat belt? No   Have you felt unusual stress, anger or loneliness in the last month? No   Who do you live with? Spouse   If you need help, do you have trouble finding someone available to you? No   Have you been bothered in the last four weeks by sexual problems? No   Do you have difficulty concentrating, remembering or making decisions? No           Does the patient have evidence of cognitive impairment? Yes    Aspirin use counseling: Does not need ASA (and currently is not on it)      Recent Lab Results:  CMP:  Lab Results   Component Value Date    GLU 80 09/17/2019    BUN 23 07/27/2021    CREATININE 0.76 07/27/2021    EGFRIFNONA 76 07/27/2021    EGFRIFAFRI 88 07/27/2021    BCR 30 (H) 07/27/2021     07/27/2021    K 4.4 07/27/2021    CO2 23 07/27/2021    CALCIUM 9.0 07/27/2021    PROTENTOTREF 6.3 07/27/2021    ALBUMIN 4.2 07/27/2021    LABGLOBREF 2.1 07/27/2021    LABIL2 2.0 07/27/2021    BILITOT 0.2 07/27/2021    ALKPHOS 69 07/27/2021    AST 35 07/27/2021    ALT 24 07/27/2021     Lipid Panel:  Lab Results   Component Value Date    TRIG 58 03/29/2018    HDL 86 (H) 03/29/2018    VLDL 11.6 03/29/2018     HbA1c:       Visual Acuity:  No exam data present    Age-appropriate Screening Schedule:  Refer to the list below for future screening recommendations based on patient's age, sex and/or medical conditions. Orders for these recommended tests are listed in the plan section. The patient has been provided with a written plan.    Health Maintenance   Topic Date Due   • ZOSTER VACCINE (2 of 2) 12/27/2016   • LIPID PANEL  05/08/2020   • INFLUENZA VACCINE  10/01/2022   • MAMMOGRAM  12/07/2023   • DXA SCAN  12/07/2023   • TDAP/TD VACCINES (4 - Td or Tdap) 06/13/2031        Subjective   History of Present Illness    Kesha Cha is a 76 y.o. female who presents for an Subsequent Wellness Visit.    The following portions of the patient's history were reviewed and updated as  appropriate: allergies, current medications, past family history, past medical history, past social history, past surgical history and problem list.    Outpatient Medications Prior to Visit   Medication Sig Dispense Refill   • colestipol (COLESTID) 1 g tablet Take 2 tablets by mouth 2 (Two) Times a Day. 60 tablet 0   • metoprolol succinate XL (TOPROL-XL) 25 MG 24 hr tablet Take 37.5 mg by mouth Daily.     • multivitamin with minerals tablet tablet Take 1 tablet by mouth Daily.     • onabotulinumtoxina (BOTOX) 100 UNITS reconstituted solution injection Inject as directed     • fluticasone (Flonase) 50 MCG/ACT nasal spray 2 sprays into the nostril(s) as directed by provider Daily. 16 g 12   • loratadine (CLARITIN) 10 MG tablet Take 1 tablet by mouth Daily. 90 tablet 3   • memantine (NAMENDA) 10 MG tablet 1/2 po daily x 7d, then 1/2 po bid x7d, then 1/2 am and 1 pm x7d then 1 po bid call for refill when ready 39 tablet 0   • methylPREDNISolone (MEDROL) 4 MG dose pack Take as directed on package instructions. 21 tablet 0     No facility-administered medications prior to visit.       Patient Active Problem List   Diagnosis   • Atopic rhinitis   • Acute otitis media   • Isolated cervical dystonia   • Radial styloid tenosynovitis   • Degeneration of intervertebral disc of lumbar region   • Disorder of jaw   • Diverticulosis of intestine   • Dyslipidemia   • Dysphagia   • Idiopathic torsion dystonia   • Post-menopausal osteoporosis   • Osteoporosis   • Palpitations   • Scoliosis   • Arthralgia of temporomandibular joint   • Upper respiratory tract infection   • Atypical chest pain   • Chest pain   • Dystonia   • Fall   • Closed nondisplaced fracture of right pubis (HCC)   • Closed fracture of sacrum (HCC)   • Elevated LFTs   • SOB (shortness of breath)   • Abnormal CT of the chest   • Coronary artery disease   • Hyperlipidemia   • PVC's (premature ventricular contractions)   • Diarrhea   • History of constipation   • Fecal  "soiling due to fecal incontinence   • Weight loss   • Hypertension       Advance Care Planning:  ACP discussion was held with the patient during this visit. Patient does not have an advance directive, information provided.    Identification of Risk Factors:  Risk factors include: Fall Risk.    Review of Systems   Constitutional: Negative for chills and fever.   HENT: Positive for congestion and postnasal drip.    Respiratory: Positive for cough. Negative for shortness of breath.    Cardiovascular: Negative for chest pain, palpitations, orthopnea and PND.   Gastrointestinal: Negative for anal bleeding.       Compared to one year ago, the patient feels her physical health is the same.  Compared to one year ago, the patient feels her mental health is the same.    Objective     Physical Exam  Vitals and nursing note reviewed.   Constitutional:       Appearance: She is well-developed.   HENT:      Head: Normocephalic and atraumatic.      Nose: Congestion present.      Comments: +PND  Neck:      Thyroid: No thyromegaly.      Vascular: No JVD.   Cardiovascular:      Rate and Rhythm: Normal rate and regular rhythm.      Heart sounds: Normal heart sounds. No murmur heard.    No friction rub. No gallop.   Pulmonary:      Effort: Pulmonary effort is normal. No respiratory distress.      Breath sounds: Normal breath sounds. No wheezing or rales.   Abdominal:      General: Bowel sounds are normal. There is no distension.      Palpations: Abdomen is soft.      Tenderness: There is no abdominal tenderness. There is no guarding or rebound.   Musculoskeletal:      Cervical back: Neck supple.   Skin:     General: Skin is warm and dry.   Neurological:      Mental Status: She is alert.   Psychiatric:         Behavior: Behavior normal.         Vitals:    08/02/22 1302   BP: 104/68   Pulse: 64   SpO2: 98%   Weight: 44.5 kg (98 lb)   Height: 162.6 cm (64\")   PainSc: 0-No pain       BMI is below normal parameters (malnutrition). " Recommendations: none (medical contraindication)      Assessment & Plan   Patient Self-Management and Personalized Health Advice  The patient has been provided with information about: weight management and preventive services including:   · Annual Wellness Visit (AWV).    Visit Diagnoses:    ICD-10-CM ICD-9-CM   1. Medicare annual wellness visit, subsequent  Z00.00 V70.0   2. Primary hypertension  I10 401.9   3. Non-seasonal allergic rhinitis due to other allergic trigger  J30.89 477.8   4. Mixed hyperlipidemia  E78.2 272.2   5. Vitamin D deficiency  E55.9 268.9   6. Drug therapy  Z79.899 V58.69       Orders Placed This Encounter   Procedures   • Comprehensive Metabolic Panel     Order Specific Question:   Release to patient     Answer:   Immediate   • Lipid Panel   • Vitamin D 25 Hydroxy     Order Specific Question:   Release to patient     Answer:   Immediate   • Allergens With / Total IgE Area 5     Order Specific Question:   Release to patient     Answer:   Immediate   • CBC & Differential     Order Specific Question:   Manual Differential     Answer:   No       Outpatient Encounter Medications as of 8/2/2022   Medication Sig Dispense Refill   • colestipol (COLESTID) 1 g tablet Take 2 tablets by mouth 2 (Two) Times a Day. 60 tablet 0   • metoprolol succinate XL (TOPROL-XL) 25 MG 24 hr tablet Take 37.5 mg by mouth Daily.     • multivitamin with minerals tablet tablet Take 1 tablet by mouth Daily.     • onabotulinumtoxina (BOTOX) 100 UNITS reconstituted solution injection Inject as directed     • Loratadine 10 MG capsule Take 1 capsule by mouth Daily. 90 each 3   • [DISCONTINUED] fluticasone (Flonase) 50 MCG/ACT nasal spray 2 sprays into the nostril(s) as directed by provider Daily. 16 g 12   • [DISCONTINUED] loratadine (CLARITIN) 10 MG tablet Take 1 tablet by mouth Daily. 90 tablet 3   • [DISCONTINUED] memantine (NAMENDA) 10 MG tablet 1/2 po daily x 7d, then 1/2 po bid x7d, then 1/2 am and 1 pm x7d then 1 po bid  call for refill when ready 39 tablet 0   • [DISCONTINUED] methylPREDNISolone (MEDROL) 4 MG dose pack Take as directed on package instructions. 21 tablet 0     No facility-administered encounter medications on file as of 2022.       Reviewed use of high risk medication in the elderly: yes  Reviewed for potential of harmful drug interactions in the elderly: yes    Follow Up:  Return in about 6 months (around 2023), or if symptoms worsen or fail to improve.     An After Visit Summary and PPPS with all of these plans were given to the patient.           ++++++++++++++++++++++++++++++++++++++++++++++++++++++++++++++++++     Chief Complaint   Patient presents with   • Allergies   • Annual Exam     Medicare     • Hypertension     Allergies  This is a chronic problem. The current episode started more than 1 month ago. The problem occurs constantly. The problem has been unchanged. Associated symptoms include congestion and coughing. Pertinent negatives include no chest pain, chills or fever. She has tried nothing for the symptoms. The treatment provided no relief.   Hypertension  This is a chronic problem. The current episode started more than 1 year ago. The problem is unchanged. The problem is controlled. Pertinent negatives include no chest pain, orthopnea, palpitations, peripheral edema, PND or shortness of breath. The current treatment provides moderate improvement.       Review of Systems   Constitutional: Negative for chills and fever.   HENT: Positive for congestion and postnasal drip.    Cardiovascular: Negative for chest pain, orthopnea, palpitations and paroxysmal nocturnal dyspnea.   Respiratory: Positive for cough. Negative for shortness of breath.    Gastrointestinal: Negative for anal bleeding.       Social History     Tobacco Use   • Smoking status: Former Smoker     Types: Cigarettes     Start date: 1958     Quit date: 1969     Years since quittin.6   • Smokeless tobacco: Never Used  "  Substance Use Topics   • Alcohol use: No     O:   Vitals:    08/02/22 1302   BP: 104/68   Pulse: 64   SpO2: 98%   Weight: 44.5 kg (98 lb)   Height: 162.6 cm (64\")   PainSc: 0-No pain     Body mass index is 16.82 kg/m².  Vitals and nursing note reviewed.   Constitutional:       Appearance: Well-developed.   HENT:      Head: Normocephalic and atraumatic.      Nose: Congestion present.      Comments: +PND  Neck:      Thyroid: No thyromegaly.      Vascular: No JVD.   Pulmonary:      Effort: Pulmonary effort is normal. No respiratory distress.      Breath sounds: Normal breath sounds. No wheezing. No rales.   Cardiovascular:      Normal rate. Regular rhythm. Normal heart sounds.      No gallop. No friction rub.   Abdominal:      General: Bowel sounds are normal. There is no distension.      Palpations: Abdomen is soft.      Tenderness: There is no abdominal tenderness. There is no guarding or rebound.   Musculoskeletal:      Cervical back: Neck supple. Skin:     General: Skin is warm and dry.   Neurological:      Mental Status: Alert.   Psychiatric:         Behavior: Behavior normal.         Diagnoses and all orders for this visit:    1. Medicare annual wellness visit, subsequent (Primary)    2. Primary hypertension  -     Comprehensive Metabolic Panel    3. Non-seasonal allergic rhinitis due to other allergic trigger  -     Allergens With / Total IgE Area 5  -     Loratadine 10 MG capsule; Take 1 capsule by mouth Daily.  Dispense: 90 each; Refill: 3    4. Mixed hyperlipidemia  -     Comprehensive Metabolic Panel  -     Lipid Panel    5. Vitamin D deficiency  -     Vitamin D 25 Hydroxy    6. Drug therapy  -     CBC & Differential    -check allergy panel and start H1 blocker  -hypertension - controlled, continue medications  -due check lipids  -due check vitamin D and blood counts    Return in about 6 months (around 2/2/2023), or if symptoms worsen or fail to improve.    "

## 2022-08-02 NOTE — PATIENT INSTRUCTIONS
Advance Care Planning and Advance Directives     You make decisions on a daily basis - decisions about where you want to live, your career, your home, your life. Perhaps one of the most important decisions you face is your choice for future medical care. Take time to talk with your family and your healthcare team and start planning today.  Advance Care Planning is a process that can help you:  Understand possible future healthcare decisions in light of your own experiences  Reflect on those decision in light of your goals and values  Discuss your decisions with those closest to you and the healthcare professionals that care for you  Make a plan by creating a document that reflects your wishes    Surrogate Decision Maker  In the event of a medical emergency, which has left you unable to communicate or to make your own decisions, you would need someone to make decisions for you.  It is important to discuss your preferences for medical treatment with this person while you are in good health.     Qualities of a surrogate decision maker:  Willing to take on this role and responsibility  Knows what you want for future medical care  Willing to follow your wishes even if they don't agree with them  Able to make difficult medical decisions under stressful circumstances    Advance Directives  These are legal documents you can create that will guide your healthcare team and decision maker(s) when needed. These documents can be stored in the electronic medical record.    Living Will - a legal document to guide your care if you have a terminal condition or a serious illness and are unable to communicate. States vary by statute in document names/types, but most forms may include one or more of the following:        -  Directions regarding life-prolonging treatments        -  Directions regarding artificially provided nutrition/hydration        -  Choosing a healthcare decision maker        -  Direction regarding organ/tissue  donation    Durable Power of  for Healthcare - this document names an -in-fact to make medical decisions for you, but it may also allow this person to make personal and financial decisions for you. Please seek the advice of an  if you need this type of document.    **Advance Directives are not required and no one may discriminate against you if you do not sign one.    Medical Orders  Many states allow specific forms/orders signed by your physician to record your wishes for medical treatment in your current state of health. This form, signed in personal communication with your physician, addresses resuscitation and other medical interventions that you may or may not want.      For more information or to schedule a time with a [facility] Advance Care Planning Facilitator contact: [facility] or call [phone] and someone will contact you directly.Below are four things you can do to prevent falls:   Begin an exercise program to improve your leg strength & balance  Ask your doctor or pharmacist to review your medicines   Get annual eye check-ups & update your eyeglasses  Make your home safer by:  Removing clutter & tripping hazards  Putting railings on all stairs & adding grab bars in the bathroom  Having good lighting, especially on stairs    Contact your local community or senior center for information on exercise, fall prevention programs, or options for improving home safety.

## 2022-08-05 ENCOUNTER — TELEPHONE (OUTPATIENT)
Dept: FAMILY MEDICINE CLINIC | Facility: CLINIC | Age: 77
End: 2022-08-05

## 2022-08-05 NOTE — TELEPHONE ENCOUNTER
Caller: Kesha Cha    Relationship: Self    Best call back number: [phone]     What was the call regarding: PATIENT CALLING STATING COUGHING / RUNNY NOSE CONGESTION SHE WOULD LIKE FOR THE PRESCRIPTION THAT DR.JOHNSON CALLED IN TO BE CANCELED SHE IS NO LONGER HAVING THESE SYMPTOMS

## 2022-08-07 LAB
25(OH)D3+25(OH)D2 SERPL-MCNC: 38.8 NG/ML (ref 30–100)
A ALTERNATA IGE QN: <0.1 KU/L
A FUMIGATUS IGE QN: <0.1 KU/L
ALBUMIN SERPL-MCNC: 4.2 G/DL (ref 3.7–4.7)
ALBUMIN/GLOB SERPL: 2.8 {RATIO} (ref 1.2–2.2)
ALP SERPL-CCNC: 65 IU/L (ref 44–121)
ALT SERPL-CCNC: 39 IU/L (ref 0–32)
AST SERPL-CCNC: 40 IU/L (ref 0–40)
BASOPHILS # BLD AUTO: 0.1 X10E3/UL (ref 0–0.2)
BASOPHILS NFR BLD AUTO: 1 %
BERMUDA GRASS IGE QN: <0.1 KU/L
BILIRUB SERPL-MCNC: 0.4 MG/DL (ref 0–1.2)
BOXELDER IGE QN: <0.1 KU/L
BUN SERPL-MCNC: 11 MG/DL (ref 8–27)
BUN/CREAT SERPL: 15 (ref 12–28)
C HERBARUM IGE QN: <0.1 KU/L
CALCIUM SERPL-MCNC: 8.9 MG/DL (ref 8.7–10.3)
CALIF WALNUT POLN IGE QN: <0.1 KU/L
CAT DANDER IGE QN: <0.1 KU/L
CHLORIDE SERPL-SCNC: 100 MMOL/L (ref 96–106)
CHOLEST SERPL-MCNC: 153 MG/DL (ref 100–199)
CMN PIGWEED IGE QN: <0.1 KU/L
CO2 SERPL-SCNC: 26 MMOL/L (ref 20–29)
COMMON RAGWEED IGE QN: <0.1 KU/L
CONV CLASS DESCRIPTION: NORMAL
COTTONWOOD IGE QN: <0.1 KU/L
CREAT SERPL-MCNC: 0.71 MG/DL (ref 0.57–1)
D FARINAE IGE QN: <0.1 KU/L
D PTERONYSS IGE QN: <0.1 KU/L
DOG DANDER IGE QN: <0.1 KU/L
EGFRCR SERPLBLD CKD-EPI 2021: 87 ML/MIN/1.73
EOSINOPHIL # BLD AUTO: 0 X10E3/UL (ref 0–0.4)
EOSINOPHIL NFR BLD AUTO: 1 %
ERYTHROCYTE [DISTWIDTH] IN BLOOD BY AUTOMATED COUNT: 15 % (ref 11.7–15.4)
GLOBULIN SER CALC-MCNC: 1.5 G/DL (ref 1.5–4.5)
GLUCOSE SERPL-MCNC: 96 MG/DL (ref 65–99)
HCT VFR BLD AUTO: 40.8 % (ref 34–46.6)
HDLC SERPL-MCNC: 77 MG/DL
HGB BLD-MCNC: 13.2 G/DL (ref 11.1–15.9)
IGE SERPL-ACNC: 9 IU/ML (ref 6–495)
IMM GRANULOCYTES # BLD AUTO: 0 X10E3/UL (ref 0–0.1)
IMM GRANULOCYTES NFR BLD AUTO: 0 %
LDLC SERPL CALC-MCNC: 63 MG/DL (ref 0–99)
LONDON PLANE IGE QN: <0.1 KU/L
LYMPHOCYTES # BLD AUTO: 1.2 X10E3/UL (ref 0.7–3.1)
LYMPHOCYTES NFR BLD AUTO: 19 %
MCH RBC QN AUTO: 31.4 PG (ref 26.6–33)
MCHC RBC AUTO-ENTMCNC: 32.4 G/DL (ref 31.5–35.7)
MCV RBC AUTO: 97 FL (ref 79–97)
MONOCYTES # BLD AUTO: 0.4 X10E3/UL (ref 0.1–0.9)
MONOCYTES NFR BLD AUTO: 6 %
MOUSE URINE PROT IGE QN: <0.1 KU/L
MT JUNIPER IGE QN: <0.1 KU/L
NEUTROPHILS # BLD AUTO: 4.5 X10E3/UL (ref 1.4–7)
NEUTROPHILS NFR BLD AUTO: 73 %
P NOTATUM IGE QN: <0.1 KU/L
PECAN/HICK TREE IGE QN: <0.1 KU/L
PLATELET # BLD AUTO: 222 X10E3/UL (ref 150–450)
POTASSIUM SERPL-SCNC: 4.2 MMOL/L (ref 3.5–5.2)
PROT SERPL-MCNC: 5.7 G/DL (ref 6–8.5)
RBC # BLD AUTO: 4.21 X10E6/UL (ref 3.77–5.28)
ROACH IGE QN: <0.1 KU/L
SALTWORT IGE QN: <0.1 KU/L
SHEEP SORREL IGE QN: <0.1 KU/L
SILVER BIRCH IGE QN: <0.1 KU/L
SODIUM SERPL-SCNC: 139 MMOL/L (ref 134–144)
TIMOTHY IGE QN: <0.1 KU/L
TRIGL SERPL-MCNC: 62 MG/DL (ref 0–149)
VLDLC SERPL CALC-MCNC: 13 MG/DL (ref 5–40)
WBC # BLD AUTO: 6.2 X10E3/UL (ref 3.4–10.8)
WHITE ASH IGE QN: <0.1 KU/L
WHITE ELM IGE QN: <0.1 KU/L
WHITE MULBERRY IGE QN: <0.1 KU/L
WHITE OAK IGE QN: <0.1 KU/L

## 2022-08-24 RX ORDER — MONTELUKAST SODIUM 4 MG/1
TABLET, CHEWABLE ORAL
Qty: 120 TABLET | Refills: 0 | Status: SHIPPED | OUTPATIENT
Start: 2022-08-24 | End: 2022-10-03 | Stop reason: SDUPTHER

## 2022-08-30 ENCOUNTER — TELEPHONE (OUTPATIENT)
Dept: FAMILY MEDICINE CLINIC | Facility: CLINIC | Age: 77
End: 2022-08-30

## 2022-08-30 NOTE — TELEPHONE ENCOUNTER
Caller: Kesha Cha    Relationship: Self    Best call back number:     What is the best time to reach you:     Who are you requesting to speak with (clinical staff, provider,  specific staff member):     Do you know the name of the person who called:     What was the call regarding: PATIENT IS CALLING IN TO SEE IF DR PA PRESCRIBES PREVAGEN WHICH IS A MEMORY MEDICATION    Do you require a callback: YES

## 2022-08-30 NOTE — TELEPHONE ENCOUNTER
No, it is only over the counter, no prescription version.  Doesn't really work though and would save her money.  RRJ

## 2022-09-06 ENCOUNTER — OFFICE VISIT (OUTPATIENT)
Dept: FAMILY MEDICINE CLINIC | Facility: CLINIC | Age: 77
End: 2022-09-06

## 2022-09-06 VITALS
OXYGEN SATURATION: 97 % | HEART RATE: 92 BPM | DIASTOLIC BLOOD PRESSURE: 60 MMHG | BODY MASS INDEX: 16.56 KG/M2 | SYSTOLIC BLOOD PRESSURE: 100 MMHG | HEIGHT: 64 IN | WEIGHT: 97 LBS

## 2022-09-06 DIAGNOSIS — I10 PRIMARY HYPERTENSION: Primary | ICD-10-CM

## 2022-09-06 DIAGNOSIS — R41.89 COGNITIVE DECLINE: ICD-10-CM

## 2022-09-06 PROCEDURE — 99214 OFFICE O/P EST MOD 30 MIN: CPT | Performed by: FAMILY MEDICINE

## 2022-09-06 RX ORDER — MEMANTINE HYDROCHLORIDE 10 MG/1
TABLET ORAL
Qty: 60 TABLET | Refills: 5 | Status: SHIPPED | OUTPATIENT
Start: 2022-09-06 | End: 2022-09-08 | Stop reason: SDUPTHER

## 2022-09-06 RX ORDER — MEMANTINE HYDROCHLORIDE 10 MG/1
TABLET ORAL
Qty: 60 TABLET | Refills: 5 | Status: SHIPPED | OUTPATIENT
Start: 2022-09-06 | End: 2022-09-06 | Stop reason: SDUPTHER

## 2022-09-06 NOTE — PROGRESS NOTES
Subjective   Kesha Cha is a 76 y.o. female. Presents today for   Chief Complaint   Patient presents with   • Colonoscopy     Needs addressed - orders?    • Hypertension       History of Present Illness  Patient with htn;  No cp/soa;  Has cognitive decline, would like something;  Was on aricept (though doesn't remember taking) we had go off as caused severe diarrhea;   Would like try another medication.    Review of Systems   Cardiovascular: Negative for chest pain and palpitations.   Psychiatric/Behavioral: Positive for decreased concentration.       Patient Active Problem List   Diagnosis   • Atopic rhinitis   • Acute otitis media   • Isolated cervical dystonia   • Radial styloid tenosynovitis   • Degeneration of intervertebral disc of lumbar region   • Disorder of jaw   • Diverticulosis of intestine   • Dyslipidemia   • Dysphagia   • Idiopathic torsion dystonia   • Post-menopausal osteoporosis   • Osteoporosis   • Palpitations   • Scoliosis   • Arthralgia of temporomandibular joint   • Upper respiratory tract infection   • Atypical chest pain   • Chest pain   • Dystonia   • Fall   • Closed nondisplaced fracture of right pubis (HCC)   • Closed fracture of sacrum (HCC)   • Elevated LFTs   • SOB (shortness of breath)   • Abnormal CT of the chest   • Coronary artery disease   • Hyperlipidemia   • PVC's (premature ventricular contractions)   • Diarrhea   • History of constipation   • Fecal soiling due to fecal incontinence   • Weight loss   • Hypertension       Social History     Socioeconomic History   • Marital status:    Tobacco Use   • Smoking status: Former Smoker     Types: Cigarettes     Start date: 1958     Quit date: 1969     Years since quittin.7   • Smokeless tobacco: Never Used   Vaping Use   • Vaping Use: Never used   Substance and Sexual Activity   • Alcohol use: No   • Drug use: No   • Sexual activity: Not Currently     Partners: Male       No Known Allergies    Current  "Outpatient Medications on File Prior to Visit   Medication Sig Dispense Refill   • colestipol (COLESTID) 1 g tablet Take 2 tablets by mouth twice daily 120 tablet 0   • Loratadine 10 MG capsule Take 1 capsule by mouth Daily. 90 each 3   • metoprolol succinate XL (TOPROL-XL) 25 MG 24 hr tablet Take 37.5 mg by mouth Daily.     • multivitamin with minerals tablet tablet Take 1 tablet by mouth Daily.     • onabotulinumtoxina (BOTOX) 100 UNITS reconstituted solution injection Inject as directed       No current facility-administered medications on file prior to visit.       Objective   Vitals:    09/06/22 1011   BP: 100/60   Pulse: 92   SpO2: 97%   Weight: 44 kg (97 lb)   Height: 162.6 cm (64\")     Body mass index is 16.65 kg/m².    Physical Exam  Vitals and nursing note reviewed.   Constitutional:       Appearance: Normal appearance. She is not toxic-appearing or diaphoretic.   HENT:      Head: Normocephalic and atraumatic.   Musculoskeletal:      Cervical back: Neck supple.   Skin:     General: Skin is warm and dry.      Capillary Refill: Capillary refill takes less than 2 seconds.   Neurological:      Mental Status: She is alert.   Psychiatric:         Mood and Affect: Mood normal.         Behavior: Behavior normal.         Cognition and Memory: Cognition is impaired. Memory is impaired.       Component      Latest Ref Rng & Units 8/2/2022   Class Description       Comment   IgE      6 - 495 IU/mL 9   D. pteronyssinus (dust mite)      Class 0 kU/L <0.10   D. farinae (dust mite)      Class 0 kU/L <0.10   Cat Dander      Class 0 kU/L <0.10   Dog Dander, IgE      Class 0 kU/L <0.10   Bermuda Grass      Class 0 kU/L <0.10   Pasha Grass      Class 0 kU/L <0.10   Cockroach,Telugu      Class 0 kU/L <0.10   Penicillium chrysogen      Class 0 kU/L <0.10   Cladosporium herbarum      Class 0 kU/L <0.10   Aspergillus fumigatus      Class 0 kU/L <0.10   Alternaria alternata      Class 0 kU/L <0.10   Maple/Iroquois      Class 0 " kU/L <0.10   Birch, Silver      Class 0 kU/L <0.10   Larue, Mountain      Class 0 kU/L <0.10   [place], White      Class 0 kU/L <0.10   Elm, American      Class 0 kU/L <0.10   [place], Pollen      Class 0 kU/L <0.10   Maple Leaf [place]      Class 0 kU/L <0.10   Antelope Tree      Class 0 kU/L <0.10   Garth, White      Class 0 kU/L <0.10   Pecan/Hickory Tree      Class 0 kU/L <0.10   White [place]      Class 0 kU/L <0.10   Ragweed, Common/Short      Class 0 kU/L <0.10   Russian Thistle      Class 0 kU/L <0.10   Pigweed, Rough/Common      Class 0 kU/L <0.10   Sheep Sorrel      Class 0 kU/L <0.10   Mouse Urine      Class 0 kU/L <0.10   WBC      3.4 - 10.8 x10E3/uL 6.2   RBC      3.77 - 5.28 x10E6/uL 4.21   Hemoglobin      11.1 - 15.9 g/dL 13.2   Hematocrit      34.0 - 46.6 % 40.8   MCV      79 - 97 fL 97   MCH      26.6 - 33.0 pg 31.4   MCHC      31.5 - 35.7 g/dL 32.4   RDW      11.7 - 15.4 % 15.0   Platelets      150 - 450 x10E3/uL 222   Neutrophil Rel %      Not Estab. % 73   Lymphocyte Rel %      Not Estab. % 19   Monocyte Rel %      Not Estab. % 6   Eosinophil Rel %      Not Estab. % 1   Basophil Rel %      Not Estab. % 1   Neutrophils Absolute      1.4 - 7.0 x10E3/uL 4.5   Lymphocytes Absolute      0.7 - 3.1 x10E3/uL 1.2   Monocytes Absolute      0.1 - 0.9 x10E3/uL 0.4   Eosinophils Absolute      0.0 - 0.4 x10E3/uL 0.0   Basophils Absolute      0.0 - 0.2 x10E3/uL 0.1   Immature Granulocyte Rel %      Not Estab. % 0   Immature Grans, Absolute      0.0 - 0.1 x10E3/uL 0.0   Glucose      65 - 99 mg/dL 96   BUN      8 - 27 mg/dL 11   Creatinine      0.57 - 1.00 mg/dL 0.71   EGFR Result      >59 mL/min/1.73 87   BUN/Creatinine Ratio      12 - 28 15   Sodium      134 - 144 mmol/L 139   Potassium      3.5 - 5.2 mmol/L 4.2   Chloride      96 - 106 mmol/L 100   CO2      20 - 29 mmol/L 26   Calcium      8.7 - 10.3 mg/dL 8.9   Total Protein      6.0 - 8.5 g/dL 5.7 (L)   Albumin      3.7 - 4.7 g/dL 4.2   Globulin      1.5 - 4.5  g/dL 1.5   A/G Ratio      1.2 - 2.2 2.8 (H)   Total Bilirubin      0.0 - 1.2 mg/dL 0.4   Alkaline Phosphatase      44 - 121 IU/L 65   AST (SGOT)      0 - 40 IU/L 40   ALT (SGPT)      0 - 32 IU/L 39 (H)   Total Cholesterol      100 - 199 mg/dL 153   Triglycerides      0 - 149 mg/dL 62   HDL Cholesterol      >39 mg/dL 77   VLDL Cholesterol Elijah      5 - 40 mg/dL 13   LDL Cholesterol       0 - 99 mg/dL 63   25 Hydroxy, Vitamin D      30.0 - 100.0 ng/mL 38.8     Assessment & Plan   Diagnoses and all orders for this visit:    1. Primary hypertension (Primary)    2. Cognitive decline  -     Discontinue: memantine (Namenda) 10 MG tablet; 1/2 nightly x7d, then 1/2 twice daily x 7d, then 1/2 am and 1 pm x 7d then 1 twice daily  Dispense: 60 tablet; Refill: 5  -     memantine (Namenda) 10 MG tablet; 1/2 nightly x7d, then 1/2 twice daily x 7d, then 1/2 am and 1 pm x 7d then 1 twice daily  Dispense: 60 tablet; Refill: 5    had change pharamcy to [facility] but will try namenda and titrate to see if tolerates to slow cognitive decline;  aricept did not tolerate as severe diarrhea;   -hypertension - controlled, continue medications           -Follow up: 3 months and pnr

## 2022-09-07 ENCOUNTER — TELEPHONE (OUTPATIENT)
Dept: FAMILY MEDICINE CLINIC | Facility: CLINIC | Age: 77
End: 2022-09-07

## 2022-09-07 NOTE — TELEPHONE ENCOUNTER
Caller: Kesha Cha    Relationship: Self    Best call back number: [phone]    What medication are you requesting: SOMETHING FOR DIAREAH    What are your current symptoms: DIAREAH    How long have you been experiencing symptoms: LONG TIME    Have you had these symptoms before:    [x] Yes  [] No    Have you been treated for these symptoms before:   [x] Yes  [] No    If a prescription is needed, what is your preferred pharmacy and phone number: [address] [facility] [phone] [facility] [phone] FX     Additional notes:SHE ADVISED THAT THE colestipol (COLESTID) 1 g tablet WAS NO LONGER WORKING AND SHE WOULD LIKE TO TRY SOMETHING ELSE.  PLEASE SEND IT TO:    [address] [facility] [phone] [facility] [phone] FX

## 2022-09-08 DIAGNOSIS — R41.89 COGNITIVE DECLINE: ICD-10-CM

## 2022-09-08 RX ORDER — MEMANTINE HYDROCHLORIDE 10 MG/1
TABLET ORAL
Qty: 60 TABLET | Refills: 5 | Status: SHIPPED | OUTPATIENT
Start: 2022-09-08 | End: 2022-09-22 | Stop reason: SDUPTHER

## 2022-09-08 NOTE — TELEPHONE ENCOUNTER
I sent a medication for her for memory loss of which we discussed.  Memantine (namenda).  It is not for diarrhea.   I resent to [place] since sounds like did not get.  RRj

## 2022-09-08 NOTE — TELEPHONE ENCOUNTER
Caller: Kesha Cha    Relationship: Self    Best call back number: [phone]    What was the call regarding: PATIENT IS CALLING TO SEE IF DR PA WOULD BE SENDING IN A SECOND PRESCRIPTION FOR DIARRHEA. PATIENT STATED THAT HE HAS ALREADY CALLED IN HER PRESCRIPTION FOR colestipol (COLESTID) 1 g tablet. PATIENT STATED THAT SHE WILL BE PICKING UP THE colestipol (COLESTID) 1 g tablet TODAY AND SHE WANTED TO  THE OTHER PRESCRIPTION AT THE SAME TIME, IF HE WAS PLANNING ON SENDING IN SOMETHING ELSE AS WELL. PATIENT WOULD LIKE A CALL BACK ASAP.    Do you require a callback: YES

## 2022-09-21 ENCOUNTER — TELEPHONE (OUTPATIENT)
Dept: FAMILY MEDICINE CLINIC | Facility: CLINIC | Age: 77
End: 2022-09-21

## 2022-09-21 DIAGNOSIS — R41.89 COGNITIVE DECLINE: ICD-10-CM

## 2022-09-21 NOTE — TELEPHONE ENCOUNTER
Caller: Kesha Cha    Relationship: Self    Best call back number:[phone]    What is the best time to reach you: ASAP     Who are you requesting to speak with (clinical staff, provider,  specific staff member): DR. PA     What was the call regarding: WHICH MEDICATION WOULD DR. PA RECOMMEND EXELON OR PREVAGEN FOR MEMORY LOSS OR NEITHER. PLEASE ADVISE.     Do you require a callback: YES

## 2022-09-22 RX ORDER — MEMANTINE HYDROCHLORIDE 10 MG/1
TABLET ORAL
Qty: 60 TABLET | Refills: 5 | Status: SHIPPED | OUTPATIENT
Start: 2022-09-22 | End: 2023-02-14 | Stop reason: SDUPTHER

## 2022-10-03 RX ORDER — MONTELUKAST SODIUM 4 MG/1
2 TABLET, CHEWABLE ORAL 2 TIMES DAILY
Qty: 120 TABLET | Refills: 0 | Status: SHIPPED | OUTPATIENT
Start: 2022-10-03 | End: 2022-10-05 | Stop reason: SDUPTHER

## 2022-10-03 NOTE — TELEPHONE ENCOUNTER
Caller: Kesha Cha    Relationship: Self    Best call back number: [phone]       Requested Prescriptions:   Requested Prescriptions     Pending Prescriptions Disp Refills   • colestipol (COLESTID) 1 g tablet 120 tablet 0     Sig: Take 2 tablets by mouth 2 (Two) Times a Day.        Pharmacy where request should be sent: [address] [phone] [facility] [phone]      Additional details provided by patient: ALL OUT OF MEDICATION    Does the patient have less than a 3 day supply:  [x] Yes  [] No    Triston Dutta Rep   10/03/22 13:10 EDT

## 2022-10-05 DIAGNOSIS — R79.89 ELEVATED LFTS: Primary | ICD-10-CM

## 2022-10-05 RX ORDER — MONTELUKAST SODIUM 4 MG/1
2 TABLET, CHEWABLE ORAL 2 TIMES DAILY
Qty: 120 TABLET | Refills: 0 | Status: SHIPPED | OUTPATIENT
Start: 2022-10-05 | End: 2022-12-26

## 2022-10-05 RX ORDER — MONTELUKAST SODIUM 4 MG/1
2 TABLET, CHEWABLE ORAL 2 TIMES DAILY
Qty: 120 TABLET | Refills: 0 | Status: SHIPPED | OUTPATIENT
Start: 2022-10-05 | End: 2022-10-05

## 2022-10-10 ENCOUNTER — TELEPHONE (OUTPATIENT)
Dept: FAMILY MEDICINE CLINIC | Facility: CLINIC | Age: 77
End: 2022-10-10

## 2022-10-10 DIAGNOSIS — K62.5 BRBPR (BRIGHT RED BLOOD PER RECTUM): Primary | ICD-10-CM

## 2022-10-10 NOTE — TELEPHONE ENCOUNTER
Spoke with spouse states pt went to ER [facility] for bleeding

## 2022-10-10 NOTE — TELEPHONE ENCOUNTER
Caller: Kesha Cha    Relationship to patient: Self    Best call back number: [phone]            Chief complaint: BLOOD IN STOOL    Type of visit: SAMEDAY/OFFICE VISIT    Requested date: AS SOON AS POSSIBLE    If rescheduling, when is the original appointment:     Additional notes:

## 2022-10-10 NOTE — TELEPHONE ENCOUNTER
Caller: Kesha Cha    Relationship: Self    Best call back number:   [phone]      What was the call regarding: BLOOD IN STOOL WITHOUT STOMACH PAINS    Do you require a callback: PLEASE CALL AND ADVISE HOW TO PROCEED

## 2022-10-12 ENCOUNTER — TELEPHONE (OUTPATIENT)
Dept: FAMILY MEDICINE CLINIC | Facility: CLINIC | Age: 77
End: 2022-10-12

## 2022-10-12 NOTE — TELEPHONE ENCOUNTER
Spoke with pt  states she went to [facility] ER they told her it was vaginal bleeding and to fup with PCP

## 2022-10-12 NOTE — TELEPHONE ENCOUNTER
PATIENT CALLED FOR HOSPITAL FOLLOW UP.   [facility]. VAGINAL DISORDER, AND BLOOD IN STOOL. SHE WENT 10/11/22    PLEASE CALL [phone]

## 2022-10-13 ENCOUNTER — TELEPHONE (OUTPATIENT)
Dept: FAMILY MEDICINE CLINIC | Facility: CLINIC | Age: 77
End: 2022-10-13

## 2022-10-13 DIAGNOSIS — K92.1 BLOOD IN STOOL: Primary | ICD-10-CM

## 2022-10-13 NOTE — TELEPHONE ENCOUNTER
Spoke with pt states she is having rectal bleeding and her stools are dark I ordered gastro urgent ref in chart told pt to go to ER If worse of bleeding persists

## 2022-10-17 ENCOUNTER — TELEPHONE (OUTPATIENT)
Dept: GASTROENTEROLOGY | Facility: CLINIC | Age: 77
End: 2022-10-17

## 2022-10-17 NOTE — TELEPHONE ENCOUNTER
I advised pt to see her Gastro doctor (jose roberto jessica). She is going to call her office today and see if she can get in to see her.

## 2022-10-17 NOTE — TELEPHONE ENCOUNTER
"Patient called twice wanting to request a callback from Lani or Mine. Attempted to call the patient back to determine what she needed, and phone went to [employer] which stated \"mailbox full.\"    Patient called 3 more times requesting to speak to Mine or Joyce with out stating why. Attempted a verification of need but again phone went to a full mailbox message.   "

## 2022-10-17 NOTE — TELEPHONE ENCOUNTER
Attempted to call pt, but it goes straight to voicemail. However, the voicemail is full; unable to leave a message.

## 2022-10-17 NOTE — TELEPHONE ENCOUNTER
Even attempted to call emergency contact (spouse) to reach patient. However, his voicemail is full also.

## 2022-10-21 ENCOUNTER — OFFICE VISIT (OUTPATIENT)
Dept: GASTROENTEROLOGY | Facility: CLINIC | Age: 77
End: 2022-10-21

## 2022-10-21 ENCOUNTER — PREP FOR SURGERY (OUTPATIENT)
Dept: SURGERY | Facility: SURGERY CENTER | Age: 77
End: 2022-10-21

## 2022-10-21 VITALS
HEART RATE: 70 BPM | DIASTOLIC BLOOD PRESSURE: 60 MMHG | TEMPERATURE: 97.6 F | SYSTOLIC BLOOD PRESSURE: 98 MMHG | WEIGHT: 98 LBS | HEIGHT: 64 IN | BODY MASS INDEX: 16.73 KG/M2

## 2022-10-21 DIAGNOSIS — Z12.11 COLON CANCER SCREENING: ICD-10-CM

## 2022-10-21 DIAGNOSIS — K92.1 BLOOD IN STOOL: Primary | ICD-10-CM

## 2022-10-21 PROCEDURE — 99214 OFFICE O/P EST MOD 30 MIN: CPT | Performed by: NURSE PRACTITIONER

## 2022-10-21 RX ORDER — SODIUM CHLORIDE 0.9 % (FLUSH) 0.9 %
10 SYRINGE (ML) INJECTION AS NEEDED
Status: CANCELLED | OUTPATIENT
Start: 2022-10-21

## 2022-10-21 RX ORDER — SODIUM CHLORIDE, SODIUM LACTATE, POTASSIUM CHLORIDE, CALCIUM CHLORIDE 600; 310; 30; 20 MG/100ML; MG/100ML; MG/100ML; MG/100ML
30 INJECTION, SOLUTION INTRAVENOUS CONTINUOUS PRN
Status: CANCELLED | OUTPATIENT
Start: 2022-10-21

## 2022-10-21 RX ORDER — SODIUM CHLORIDE 0.9 % (FLUSH) 0.9 %
3 SYRINGE (ML) INJECTION EVERY 12 HOURS SCHEDULED
Status: CANCELLED | OUTPATIENT
Start: 2022-10-21

## 2022-10-21 NOTE — PROGRESS NOTES
"Chief Complaint   Patient presents with   • Rectal Bleeding   • Colon Cancer Screening         History of Present Illness  76-year old female presents to the office today for evaluation of blood in her stool.  She reports having an average of 1 bowel movement per day that is of normal consistency.  She reports that her stool is soft and she denies straining.  She does report some bright red blood on the toilet tissue with wiping most times that she has a bowel movement.  If she is not currently on any blood thinners or aspirin.  She denies any alcohol use.  She is a non-smoker.  She reports her last episode of rectal bleeding occurred last week.  She denies any melena.  She denies any weight loss.  She is unsure as to when she had her last colonoscopy.  She is unsure if she has ever had polyps in the past.  She denies any family history of colon cancer.  She does not endorse any history of hemorrhoids.    She denies any upper GI symptoms such as heartburn, reflux, nausea, vomiting, or dysphagia.    Review of Systems   Constitutional: Negative for fever and unexpected weight change.   HENT: Negative for trouble swallowing.    Cardiovascular: Negative for chest pain.   Gastrointestinal: Positive for anal bleeding. Negative for abdominal distention, abdominal pain, blood in stool, constipation, diarrhea, nausea, rectal pain and vomiting.      Result Review :       US pelvis transvaginal non-ob (10/10/2022 15:45)  DEXA Scan (12/07/2021)  CBC AND DIFFERENTIAL (10/10/2022 15:00)    Vital Signs:   BP 98/60   Pulse 70   Temp 97.6 °F (36.4 °C)   Ht 162.6 cm (64\")   Wt 44.5 kg (98 lb)   BMI 16.82 kg/m²     Body mass index is 16.82 kg/m².     Physical Exam  Vitals reviewed.   Constitutional:       Appearance: Normal appearance.   HENT:      Head: Normocephalic.      Nose: Nose normal.      Mouth/Throat:      Mouth: Mucous membranes are moist.   Eyes:      General: No scleral icterus.     Extraocular Movements: Extraocular " movements intact.   Cardiovascular:      Rate and Rhythm: Normal rate and regular rhythm.      Pulses: Normal pulses.      Heart sounds: Normal heart sounds.   Pulmonary:      Effort: Pulmonary effort is normal. No respiratory distress.      Breath sounds: Normal breath sounds.   Abdominal:      General: Abdomen is flat. Bowel sounds are normal. There is no distension.      Palpations: Abdomen is soft. There is no mass.      Tenderness: There is no abdominal tenderness. There is no guarding.   Musculoskeletal:         General: Normal range of motion.      Cervical back: Normal range of motion and neck supple.   Skin:     General: Skin is warm and dry.   Neurological:      General: No focal deficit present.      Mental Status: She is alert and oriented to person, place, and time.   Psychiatric:         Mood and Affect: Mood normal.         Behavior: Behavior normal.         Thought Content: Thought content normal.         Judgment: Judgment normal.       Assessment and Plan    Diagnoses and all orders for this visit:    1. Blood in stool (Primary)    2. Colon cancer screening           Patient Instructions   1.  For further evaluation of rectal bleeding and for colon cancer screening we will schedule a colonoscopy. Additional recommendations pending outcome of procedure.      Discussion:    For colon cancer screening and further evaluation of rectal bleeding we will proceed with colonoscopy.  CBC is stable and does not demonstrate any anemia.  Additional recommendations pending outcome of procedure.  Patient verbalized understanding of above plan of care and is in agreement.  All questions answered and support provided.       EMR Dragon/Transcription Disclaimer:  This document has been Dictated utilizing Dragon dictation.

## 2022-10-21 NOTE — PATIENT INSTRUCTIONS
For further evaluation of rectal bleeding and for colon cancer screening we will schedule a colonoscopy. Additional recommendations pending outcome of procedure.

## 2022-10-24 ENCOUNTER — TELEPHONE (OUTPATIENT)
Dept: GASTROENTEROLOGY | Facility: CLINIC | Age: 77
End: 2022-10-24

## 2022-10-24 ENCOUNTER — TELEPHONE (OUTPATIENT)
Dept: FAMILY MEDICINE CLINIC | Facility: CLINIC | Age: 77
End: 2022-10-24

## 2022-10-24 RX ORDER — FLUCONAZOLE 150 MG/1
TABLET ORAL
Qty: 2 TABLET | Refills: 0 | Status: SHIPPED | OUTPATIENT
Start: 2022-10-24 | End: 2023-02-14

## 2022-10-24 NOTE — TELEPHONE ENCOUNTER
Patient called wanting to speak to Mine about a vaginal odor. Told the patient she might want to contact her PCP as well to inquire.    English

## 2022-10-24 NOTE — TELEPHONE ENCOUNTER
Caller: Duarte Cha    Relationship: Self    Best call back number: [phone]   What medication are you requesting: DID NOT SPECIFY    What are your current symptoms: VAGINAL ODOR      How long have you been experiencing symptoms:1 WEEK     Have you had these symptoms before:    [] Yes  [x] No    Have you been treated for these symptoms before:   [] Yes  [x] No    If a prescription is needed, what is your preferred pharmacy and phone number:      [address] [place] [address] [phone] [facility] [phone]   [phone]    Additional notes:      DUARTE SAYS SHE NOTICES A VAGINAL ODOR ABOUT 4-6 HOURS AFTER SHOWERING

## 2022-10-25 ENCOUNTER — TELEPHONE (OUTPATIENT)
Dept: FAMILY MEDICINE CLINIC | Facility: CLINIC | Age: 77
End: 2022-10-25

## 2022-10-25 NOTE — TELEPHONE ENCOUNTER
Hub staff attempted to follow warm transfer process and was unsuccessful     Caller: Kesha Cha    Relationship to patient: Self    Best call back number: [phone]    Patient is needing: THE PATIENT WOULD LIKE TO KNOW WHY SHE WAS PRESCRIBED DIFLUCAN MEDICATION AND WOULD LIKE ADVICE ON THIS.

## 2022-10-26 ENCOUNTER — TELEPHONE (OUTPATIENT)
Dept: FAMILY MEDICINE CLINIC | Facility: CLINIC | Age: 77
End: 2022-10-26

## 2022-10-26 NOTE — TELEPHONE ENCOUNTER
Caller: Kesha Cha    Relationship: Self    Best call back number: [phone]    What medication are you requesting: UNKNOWN     What are your current symptoms: BOWEL PROBLEMS, ODOR    How long have you been experiencing symptoms: FEW WEEKS     If a prescription is needed, what is your preferred pharmacy and phone number: [facility] [id number] - [facility] [address] [facility] - [phone] [facility] [phone] FX    Additional notes: PATIENT IS REQUESTING A MEDICATION THAT HELPS WITH DIGESTIVE HEALTH. PATIENT STATES IT IS A SINGULAR PILL THAT IS TAKEN.     PLEASE CALL PATIENT ONCE PRESCRIPTION IS SENT OVER

## 2022-12-26 DIAGNOSIS — R79.89 ELEVATED LFTS: ICD-10-CM

## 2022-12-26 RX ORDER — MONTELUKAST SODIUM 4 MG/1
TABLET, CHEWABLE ORAL
Qty: 120 TABLET | Refills: 0 | Status: SHIPPED | OUTPATIENT
Start: 2022-12-26 | End: 2023-01-04

## 2023-01-03 DIAGNOSIS — R79.89 ELEVATED LFTS: ICD-10-CM

## 2023-01-04 DIAGNOSIS — R79.89 ELEVATED LFTS: ICD-10-CM

## 2023-01-04 RX ORDER — MONTELUKAST SODIUM 4 MG/1
TABLET, CHEWABLE ORAL
Qty: 240 TABLET | Refills: 0 | Status: SHIPPED | OUTPATIENT
Start: 2023-01-04 | End: 2023-01-04 | Stop reason: SDUPTHER

## 2023-01-04 RX ORDER — MONTELUKAST SODIUM 4 MG/1
2 TABLET, CHEWABLE ORAL 2 TIMES DAILY
Qty: 240 TABLET | Refills: 0 | Status: SHIPPED | OUTPATIENT
Start: 2023-01-04 | End: 2023-01-13 | Stop reason: SDUPTHER

## 2023-01-11 ENCOUNTER — TELEPHONE (OUTPATIENT)
Dept: FAMILY MEDICINE CLINIC | Facility: CLINIC | Age: 78
End: 2023-01-11
Payer: MEDICARE

## 2023-01-11 NOTE — TELEPHONE ENCOUNTER
Caller: Kesha Cha    Relationship: Self    Best call back number: [phone]    What is the best time to reach you: ANY    Who are you requesting to speak with (clinical staff, provider,  specific staff member): CLINICAL STAFF    What was the call regarding: PATIENT STATES THAT SHE HAS BEEN DEALING WITH A BAD VAGINAL ODOR FOR MONTHS. PATIENT STATES THAT WITHIN 12 HOURS OF TAKING A SHOWER THE ODOR RETURNS. PATIENT WANTS DR. PA TO BE AWARE PRIOR TO THE APPOINTMENT SCHEDULED FOR 02.14.23. THE PATIENT IS REQUESTING A CALL BACK SO SHE CAN ASK A COUPLE OF QUESTIONS AND GET SOME ADVICE.     HUB ATTEMPTED TO SCHEDULE AN APPOINTMENT WITH DARIO BHAKTA ON 01.13.23, BUT THE PATIENT STATED SHE WANTED TO WAIT AND SEE DR. PA.     Do you require a callback: YES

## 2023-01-12 RX ORDER — METRONIDAZOLE 7.5 MG/G
GEL VAGINAL 2 TIMES DAILY
Qty: 70 G | Refills: 0 | Status: SHIPPED | OUTPATIENT
Start: 2023-01-12 | End: 2023-02-14 | Stop reason: SDUPTHER

## 2023-01-13 DIAGNOSIS — R79.89 ELEVATED LFTS: ICD-10-CM

## 2023-01-13 RX ORDER — MONTELUKAST SODIUM 4 MG/1
2 TABLET, CHEWABLE ORAL 2 TIMES DAILY
Qty: 240 TABLET | Refills: 0 | Status: SHIPPED | OUTPATIENT
Start: 2023-01-13 | End: 2023-03-02 | Stop reason: SDUPTHER

## 2023-01-13 NOTE — TELEPHONE ENCOUNTER
Caller: Kesha Cha    Relationship: Self    Best call back number: [phone]    Requested Prescriptions:   Requested Prescriptions     Pending Prescriptions Disp Refills   • colestipol (COLESTID) 1 g tablet 240 tablet 0     Sig: Take 2 tablets by mouth 2 (Two) Times a Day.        Pharmacy where request should be sent: [id number] [place] [address] [facility] AT [facility] - [phone]  - [phone] FX     Additional details provided by patient: NA    Does the patient have less than a 3 day supply:  [x] Yes  [] No    Would you like a call back once the refill request has been completed: [x] Yes [] No    If the office needs to give you a call back, can they leave a voicemail: [x] Yes [] No    Triston Patel Rep   01/13/23 09:11 EST

## 2023-02-14 ENCOUNTER — TELEPHONE (OUTPATIENT)
Dept: FAMILY MEDICINE CLINIC | Facility: CLINIC | Age: 78
End: 2023-02-14

## 2023-02-14 ENCOUNTER — OFFICE VISIT (OUTPATIENT)
Dept: FAMILY MEDICINE CLINIC | Facility: CLINIC | Age: 78
End: 2023-02-14
Payer: MEDICARE

## 2023-02-14 VITALS
DIASTOLIC BLOOD PRESSURE: 60 MMHG | BODY MASS INDEX: 16.56 KG/M2 | TEMPERATURE: 96.8 F | SYSTOLIC BLOOD PRESSURE: 110 MMHG | HEIGHT: 64 IN | OXYGEN SATURATION: 98 % | RESPIRATION RATE: 18 BRPM | WEIGHT: 97 LBS | HEART RATE: 80 BPM

## 2023-02-14 DIAGNOSIS — M79.18 RIGHT BUTTOCK PAIN: ICD-10-CM

## 2023-02-14 DIAGNOSIS — I10 PRIMARY HYPERTENSION: Primary | ICD-10-CM

## 2023-02-14 DIAGNOSIS — R41.89 COGNITIVE DECLINE: ICD-10-CM
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
## 2023-02-14 PROCEDURE — 99214 OFFICE O/P EST MOD 30 MIN: CPT | Performed by: FAMILY MEDICINE

## 2023-02-14 RX ORDER — MEMANTINE HYDROCHLORIDE 10 MG/1
TABLET ORAL
Qty: 180 TABLET | Refills: 1 | Status: SHIPPED | OUTPATIENT
Start: 2023-02-14

## 2023-02-14 RX ORDER — METRONIDAZOLE 7.5 MG/G
GEL VAGINAL 2 TIMES DAILY
Qty: 70 G | Refills: 0 | Status: SHIPPED | OUTPATIENT
Start: 2023-02-14

## 2023-02-14 RX ORDER — METHYLPREDNISOLONE 4 MG/1
TABLET ORAL
Qty: 21 TABLET | Refills: 0 | Status: SHIPPED | OUTPATIENT
Start: 2023-02-14

## 2023-02-14 RX ORDER — FLUCONAZOLE 150 MG/1
150 TABLET ORAL ONCE
Qty: 1 TABLET | Refills: 0 | Status: SHIPPED | OUTPATIENT
Start: 2023-02-14 | End: 2023-02-14

## 2023-02-14 NOTE — PROGRESS NOTES
Subjective   Kesha Cha is a 77 y.o. female. Presents today for   Chief Complaint   Patient presents with   • Hypertension   • Fall     C/o recent fall last week rt hip back        History of Present Illness  Patient 76 y/o reports fell, though doesn't recall specifics;  Struggling with cognitive decline;   Low back pain, no numbness or radiation of symptoms;   On metoprolol for bp and stable;   No syncope;  No light headed or dizziness.      Review of Systems   Cardiovascular: Negative for chest pain.   Musculoskeletal: Positive for arthralgias and gait problem.   Neurological: Negative for dizziness, syncope, light-headedness and headaches.       Patient Active Problem List   Diagnosis   • Atopic rhinitis   • Acute otitis media   • Isolated cervical dystonia   • Radial styloid tenosynovitis   • Degeneration of intervertebral disc of lumbar region   • Disorder of jaw   • Diverticulosis of intestine   • Dyslipidemia   • Dysphagia   • Idiopathic torsion dystonia   • Post-menopausal osteoporosis   • Osteoporosis   • Palpitations   • Scoliosis   • Arthralgia of temporomandibular joint   • Upper respiratory tract infection   • Atypical chest pain   • Chest pain   • Dystonia   • Fall   • Closed nondisplaced fracture of right pubis (HCC)   • Closed fracture of sacrum (HCC)   • Elevated LFTs   • SOB (shortness of breath)   • Abnormal CT of the chest   • Coronary artery disease   • Hyperlipidemia   • PVC's (premature ventricular contractions)   • Diarrhea   • History of constipation   • Fecal soiling due to fecal incontinence   • Weight loss   • Hypertension       Social History     Socioeconomic History   • Marital status:    Tobacco Use   • Smoking status: Former     Types: Cigarettes     Start date: 1958     Quit date: 1969     Years since quittin.1   • Smokeless tobacco: Never   Vaping Use   • Vaping Use: Never used   Substance and Sexual Activity   • Alcohol use: No   • Drug use: No   • Sexual  "activity: Not Currently     Partners: Male       Allergies   Allergen Reactions   • Aricept [Donepezil] Diarrhea       Current Outpatient Medications on File Prior to Visit   Medication Sig Dispense Refill   • colestipol (COLESTID) 1 g tablet Take 2 tablets by mouth 2 (Two) Times a Day. 240 tablet 0   • Loratadine 10 MG capsule Take 1 capsule by mouth Daily. 90 each 3   • memantine (Namenda) 10 MG tablet 1/2 nightly x7d, then 1/2 twice daily x 7d, then 1/2 am and 1 pm x 7d then 1 twice daily 60 tablet 5   • metoprolol succinate XL (TOPROL-XL) 25 MG 24 hr tablet Take 37.5 mg by mouth Daily.     • metroNIDAZOLE (METROGEL VAGINAL) 0.75 % vaginal gel Insert  into the vagina 2 (Two) Times a Day. 70 g 0   • multivitamin with minerals tablet tablet Take 1 tablet by mouth Daily.     • onabotulinumtoxina (BOTOX) 100 UNITS reconstituted solution injection Inject as directed     • [DISCONTINUED] fluconazole (Diflucan) 150 MG tablet Po x1 now and repeat in 3 days 2 tablet 0     No current facility-administered medications on file prior to visit.       Objective   Vitals:    02/14/23 1137   BP: 110/60   Pulse: 80   Resp: 18   Temp: 96.8 °F (36 °C)   TempSrc: Temporal   SpO2: 98%   Weight: 44 kg (97 lb)   Height: 162.6 cm (64\")   PainSc:   4   PainLoc: Hip     Body mass index is 16.65 kg/m².    Physical Exam  Vitals and nursing note reviewed.   Constitutional:       Appearance: She is well-developed.   Musculoskeletal:      Lumbar back: Tenderness present. No bony tenderness.        Back:       Left hip: No deformity or bony tenderness.   Skin:     General: Skin is warm and dry.   Neurological:      Mental Status: She is alert and oriented to person, place, and time.   Psychiatric:         Behavior: Behavior normal.       Component      Latest Ref Rng & Units 7/27/2021 8/2/2022   Glucose      65 - 99 mg/dL  96   BUN      8 - 27 mg/dL  11   Creatinine      0.57 - 1.00 mg/dL  0.71   EGFR Result      >59 mL/min/1.73  87 "   BUN/Creatinine Ratio      12 - 28  15   Sodium      134 - 144 mmol/L  139   Potassium      3.5 - 5.2 mmol/L  4.2   Chloride      96 - 106 mmol/L  100   CO2      20 - 29 mmol/L  26   Calcium      8.7 - 10.3 mg/dL  8.9   Total Protein      6.0 - 8.5 g/dL  5.7 (L)   Albumin      3.7 - 4.7 g/dL  4.2   Globulin      1.5 - 4.5 g/dL  1.5   A/G Ratio      1.2 - 2.2  2.8 (H)   Total Bilirubin      0.0 - 1.2 mg/dL  0.4   Alkaline Phosphatase      44 - 121 IU/L  65   AST (SGOT)      0 - 40 IU/L  40   ALT (SGPT)      0 - 32 IU/L  39 (H)   TSH Baseline      0.450 - 4.500 uIU/mL 1.730    Vitamin B-12      232 - 1,245 pg/mL 659    RPR      Non Reactive Non Reactive        Assessment & Plan   Diagnoses and all orders for this visit:    1. Primary hypertension (Primary)    2. Cognitive decline    3. Right buttock pain    4. Fall, initial encounter    continue namenda, forgetting at times;  Will refill;  Of aricept as worsened diarrhea, though hsa chronic issues.    -hypertension - controlled, continue medications  -fall - may need PT;  Ok medrol pack if needs;  Consider PT           -Follow up: 3 months and prn

## 2023-02-14 NOTE — TELEPHONE ENCOUNTER
Pt has been having vaginal odor said she cant say [place] when it started but it has been happening for a few months now. Pt states that she has not had any abnormal discharge or itching.       Forgot to tell Dr. Owens during her apt today.       [id number]     [phone]

## 2023-03-02 DIAGNOSIS — R79.89 ELEVATED LFTS: ICD-10-CM

## 2023-03-02 RX ORDER — MONTELUKAST SODIUM 4 MG/1
2 TABLET, CHEWABLE ORAL 2 TIMES DAILY
Qty: 240 TABLET | Refills: 0 | Status: SHIPPED | OUTPATIENT
Start: 2023-03-02 | End: 2023-03-10

## 2023-03-10 DIAGNOSIS — R79.89 ELEVATED LFTS: ICD-10-CM

## 2023-03-10 RX ORDER — MONTELUKAST SODIUM 4 MG/1
TABLET, CHEWABLE ORAL
Qty: 60 TABLET | Refills: 5 | Status: SHIPPED | OUTPATIENT
Start: 2023-03-10 | End: 2023-03-23 | Stop reason: SDUPTHER

## 2023-03-23 DIAGNOSIS — R79.89 ELEVATED LFTS: ICD-10-CM

## 2023-03-23 RX ORDER — MONTELUKAST SODIUM 4 MG/1
2 TABLET, CHEWABLE ORAL 2 TIMES DAILY
Qty: 60 TABLET | Refills: 5 | Status: SHIPPED | OUTPATIENT
Start: 2023-03-23 | End: 2023-03-30 | Stop reason: SDUPTHER

## 2023-03-23 NOTE — TELEPHONE ENCOUNTER
Caller: Kesha Cha    Relationship: Self    Best call back number: [phone]     Requested Prescriptions:   Requested Prescriptions     Pending Prescriptions Disp Refills   • colestipol (COLESTID) 1 g tablet 60 tablet 5     Sig: Take 2 tablets by mouth 2 (Two) Times a Day.        Pharmacy where request should be sent: [facility] [id number] [address] - [phone] [facility] [phone] FX     Last office visit with prescribing clinician: 2/14/2023   Last telemedicine visit with prescribing clinician: 5/18/2023   Next office visit with prescribing clinician: 5/18/2023     Does the patient have less than a 3 day supply:  [x] Yes  [] No    Would you like a call back once the refill request has been completed: [] Yes [x] No    If the office needs to give you a call back, can they leave a voicemail: [] Yes [x] No    Triston Phillips Rep   03/23/23 11:51 EDT

## 2023-03-29 ENCOUNTER — TELEPHONE (OUTPATIENT)
Dept: FAMILY MEDICINE CLINIC | Facility: CLINIC | Age: 78
End: 2023-03-29

## 2023-03-29 DIAGNOSIS — M79.18 RIGHT BUTTOCK PAIN: ICD-10-CM

## 2023-03-29 RX ORDER — METHYLPREDNISOLONE 4 MG/1
TABLET ORAL
Qty: 21 TABLET | Refills: 0 | OUTPATIENT
Start: 2023-03-29

## 2023-03-29 RX ORDER — METRONIDAZOLE 7.5 MG/G
GEL VAGINAL 2 TIMES DAILY
Qty: 70 G | Refills: 0 | OUTPATIENT
Start: 2023-03-29

## 2023-03-29 NOTE — TELEPHONE ENCOUNTER
Caller: Kesha Cha    Relationship: Self    Best call back number: [phone]    What is the best time to reach you: ANYTIME    Who are you requesting to speak with (clinical staff, provider,  specific staff member): CLINICAL    What was the call regarding: PATIENT CALLING WANTING TO KNOW IF DR. PA WANTS HER TO CONTINUE TAKING THE methylPREDNISolone (MEDROL) 4 MG dose pack AND THE metroNIDAZOLE (METROGEL VAGINAL) 0.75 % vaginal gel.    Do you require a callback: YES TO ADVISE.

## 2023-03-29 NOTE — TELEPHONE ENCOUNTER
Caller: Kesha Cha    Relationship: Self    Best call back number: [phone]    Requested Prescriptions:   Requested Prescriptions     Pending Prescriptions Disp Refills   • metroNIDAZOLE (METROGEL VAGINAL) 0.75 % vaginal gel 70 g 0     Sig: Insert  into the vagina 2 (Two) Times a Day.   • methylPREDNISolone (MEDROL) 4 MG dose pack 21 tablet 0     Sig: Take as directed on package instructions.        Pharmacy where request should be sent: [facility] [id number] [address] - [phone] [facility] [phone] FX     Last office visit with prescribing clinician: 2/14/2023   Last telemedicine visit with prescribing clinician: 5/18/2023   Next office visit with prescribing clinician: 5/18/2023     Does the patient have less than a 3 day supply:  [x] Yes  [] No    Would you like a call back once the refill request has been completed: [] Yes [x] No    If the office needs to give you a call back, can they leave a voicemail: [] Yes [x] No    Triston Phillips Rep   03/29/23 10:27 EDT

## 2023-03-30 DIAGNOSIS — R79.89 ELEVATED LFTS: ICD-10-CM

## 2023-03-30 RX ORDER — MONTELUKAST SODIUM 4 MG/1
2 TABLET, CHEWABLE ORAL 2 TIMES DAILY
Qty: 60 TABLET | Refills: 5 | Status: SHIPPED | OUTPATIENT
Start: 2023-03-30

## 2023-03-30 NOTE — TELEPHONE ENCOUNTER
Caller: Kesha Cha    Relationship: Self    Best call back number: [phone]    Requested Prescriptions:   Requested Prescriptions     Pending Prescriptions Disp Refills   • colestipol (COLESTID) 1 g tablet 60 tablet 5     Sig: Take 2 tablets by mouth 2 (Two) Times a Day.        Pharmacy where request should be sent: [facility] [id number] [address] - [phone] [facility] [phone]      Last office visit with prescribing clinician: 2/14/2023   Last telemedicine visit with prescribing clinician: 5/18/2023   Next office visit with prescribing clinician: 5/18/2023     Additional details provided by patient: 3-4 DAYS LEFT    Does the patient have less than a 3 day supply:  [] Yes  [x] No    Would you like a call back once the refill request has been completed: [] Yes [x] No    If the office needs to give you a call back, can they leave a voicemail: [] Yes [x] No    Triston Schuster Rep   03/30/23 11:16 EDT

## 2023-04-12 ENCOUNTER — TELEPHONE (OUTPATIENT)
Dept: FAMILY MEDICINE CLINIC | Facility: CLINIC | Age: 78
End: 2023-04-12

## 2023-04-12 DIAGNOSIS — M81.0 POST-MENOPAUSAL OSTEOPOROSIS: Primary | ICD-10-CM

## 2023-04-12 NOTE — TELEPHONE ENCOUNTER
Caller: eKsha Cha    Relationship: Self    Best call back number: [phone]    What orders are you requesting (i.e. lab or imaging): OSTEOPOROSIS TESTING    In what timeframe would the patient need to come in: ASAP    Additional notes: PATIENT WOULD LIKE TO STAY IN THE [facility] AREA TO GET THIS TESTING DONE.    PLEASE ADVISE

## 2023-05-18 ENCOUNTER — OFFICE VISIT (OUTPATIENT)
Dept: FAMILY MEDICINE CLINIC | Facility: CLINIC | Age: 78
End: 2023-05-18
Payer: MEDICARE

## 2023-05-18 VITALS
HEART RATE: 80 BPM | HEIGHT: 64 IN | BODY MASS INDEX: 14.51 KG/M2 | OXYGEN SATURATION: 98 % | RESPIRATION RATE: 18 BRPM | SYSTOLIC BLOOD PRESSURE: 110 MMHG | WEIGHT: 85 LBS | DIASTOLIC BLOOD PRESSURE: 60 MMHG

## 2023-05-18 DIAGNOSIS — R41.89 COGNITIVE DECLINE: ICD-10-CM

## 2023-05-18 DIAGNOSIS — R15.2 FECAL URGENCY: ICD-10-CM

## 2023-05-18 DIAGNOSIS — R63.4 WEIGHT LOSS: ICD-10-CM

## 2023-05-18 DIAGNOSIS — R63.4 ABNORMAL WEIGHT LOSS: ICD-10-CM

## 2023-05-18 DIAGNOSIS — I10 PRIMARY HYPERTENSION: Primary | ICD-10-CM

## 2023-05-18 DIAGNOSIS — E55.9 VITAMIN D DEFICIENCY: ICD-10-CM

## 2023-05-18 PROCEDURE — 1159F MED LIST DOCD IN RCRD: CPT | Performed by: FAMILY MEDICINE

## 2023-05-18 PROCEDURE — 3078F DIAST BP <80 MM HG: CPT | Performed by: FAMILY MEDICINE

## 2023-05-18 PROCEDURE — 99214 OFFICE O/P EST MOD 30 MIN: CPT | Performed by: FAMILY MEDICINE

## 2023-05-18 PROCEDURE — 3074F SYST BP LT 130 MM HG: CPT | Performed by: FAMILY MEDICINE

## 2023-05-18 PROCEDURE — 1160F RVW MEDS BY RX/DR IN RCRD: CPT | Performed by: FAMILY MEDICINE

## 2023-05-18 NOTE — PROGRESS NOTES
Subjective   Kesha Cha is a 77 y.o. female. Presents today for   Chief Complaint   Patient presents with   • Hypertension     6 Month follow up        History of Present Illness  Patient 76 y/o female with cognitive decline, on memantine, doing ok;  Has htn, cotnrolled;  Losing weight, here discuss protein shakes;  No falls;  No cp/soa;  Doing ok with fecal urgency on colestipol    Review of Systems   Respiratory: Negative for shortness of breath.    Cardiovascular: Negative for chest pain.   Psychiatric/Behavioral: Positive for decreased concentration.       Patient Active Problem List   Diagnosis   • Atopic rhinitis   • Acute otitis media   • Isolated cervical dystonia   • Radial styloid tenosynovitis   • Degeneration of intervertebral disc of lumbar region   • Disorder of jaw   • Diverticulosis of intestine   • Dyslipidemia   • Dysphagia   • Idiopathic torsion dystonia   • Post-menopausal osteoporosis   • Osteoporosis   • Palpitations   • Scoliosis   • Arthralgia of temporomandibular joint   • Upper respiratory tract infection   • Atypical chest pain   • Chest pain   • Dystonia   • Fall   • Closed nondisplaced fracture of right pubis   • Closed fracture of sacrum   • Elevated LFTs   • SOB (shortness of breath)   • Abnormal CT of the chest   • Coronary artery disease   • Hyperlipidemia   • PVC's (premature ventricular contractions)   • Diarrhea   • History of constipation   • Fecal soiling due to fecal incontinence   • Weight loss   • Hypertension       Social History     Socioeconomic History   • Marital status:    Tobacco Use   • Smoking status: Former     Types: Cigarettes     Start date: 1958     Quit date: 1969     Years since quittin.4   • Smokeless tobacco: Never   Vaping Use   • Vaping Use: Never used   Substance and Sexual Activity   • Alcohol use: No   • Drug use: No   • Sexual activity: Not Currently     Partners: Male       Allergies   Allergen Reactions   • Aricept  "[Donepezil] Diarrhea       Current Outpatient Medications on File Prior to Visit   Medication Sig Dispense Refill   • colestipol (COLESTID) 1 g tablet Take 2 tablets by mouth 2 (Two) Times a Day. 60 tablet 5   • Loratadine 10 MG capsule Take 1 capsule by mouth Daily. 90 each 3   • memantine (Namenda) 10 MG tablet 1 twice daily 180 tablet 1   • methylPREDNISolone (MEDROL) 4 MG dose pack Take as directed on package instructions. 21 tablet 0   • metoprolol succinate XL (TOPROL-XL) 25 MG 24 hr tablet Take 1.5 tablets by mouth Daily.     • metroNIDAZOLE (METROGEL VAGINAL) 0.75 % vaginal gel Insert  into the vagina 2 (Two) Times a Day. 70 g 0   • multivitamin with minerals tablet tablet Take 1 tablet by mouth Daily.     • onabotulinumtoxina (BOTOX) 100 UNITS reconstituted solution injection Inject as directed       No current facility-administered medications on file prior to visit.       Objective   Vitals:    05/18/23 1037   BP: 110/60   BP Location: Right arm   Patient Position: Sitting   Cuff Size: Adult   Pulse: 80   Resp: 18   SpO2: 98%   Weight: 38.6 kg (85 lb)   Height: 162.6 cm (64\")     Body mass index is 14.59 kg/m².    Physical Exam  Vitals and nursing note reviewed.   Constitutional:       Appearance: She is well-developed.   Skin:     General: Skin is warm and dry.   Neurological:      Mental Status: She is alert and oriented to person, place, and time.   Psychiatric:         Behavior: Behavior normal.         Assessment & Plan   Diagnoses and all orders for this visit:    1. Primary hypertension (Primary)  -     Comprehensive Metabolic Panel    2. Cognitive decline    3. Fecal urgency    4. Weight loss    5. Abnormal weight loss    6. Vitamin D deficiency  -     Vitamin D,25-Hydroxy    check cmp, vitamin D as due  -hypertension - controlled, continue medications  -continue colestipol  Start ensure twice daily;  Consider remeron to steimulate appetite;             BMI is within normal parameters. No other " follow-up for BMI required.     -Follow up: 3 months and prn

## 2023-05-19 LAB
25(OH)D3+25(OH)D2 SERPL-MCNC: 33.9 NG/ML (ref 30–100)
ALBUMIN SERPL-MCNC: 4.4 G/DL (ref 3.5–5.2)
ALBUMIN/GLOB SERPL: 2.3 G/DL
ALP SERPL-CCNC: 88 U/L (ref 39–117)
ALT SERPL-CCNC: 25 U/L (ref 1–33)
AST SERPL-CCNC: 34 U/L (ref 1–32)
BILIRUB SERPL-MCNC: 0.4 MG/DL (ref 0–1.2)
BUN SERPL-MCNC: 15 MG/DL (ref 8–23)
BUN/CREAT SERPL: 22.7 (ref 7–25)
CALCIUM SERPL-MCNC: 9.6 MG/DL (ref 8.6–10.5)
CHLORIDE SERPL-SCNC: 104 MMOL/L (ref 98–107)
CO2 SERPL-SCNC: 29.7 MMOL/L (ref 22–29)
CREAT SERPL-MCNC: 0.66 MG/DL (ref 0.57–1)
EGFRCR SERPLBLD CKD-EPI 2021: 89.4 ML/MIN/1.73
GLOBULIN SER CALC-MCNC: 1.9 GM/DL
GLUCOSE SERPL-MCNC: 67 MG/DL (ref 65–99)
POTASSIUM SERPL-SCNC: 3.9 MMOL/L (ref 3.5–5.2)
PROT SERPL-MCNC: 6.3 G/DL (ref 6–8.5)
SODIUM SERPL-SCNC: 142 MMOL/L (ref 136–145)

## 2023-05-22 ENCOUNTER — TELEPHONE (OUTPATIENT)
Dept: FAMILY MEDICINE CLINIC | Facility: CLINIC | Age: 78
End: 2023-05-22
Payer: MEDICARE

## 2023-05-22 DIAGNOSIS — R79.89 ELEVATED LFTS: ICD-10-CM

## 2023-05-22 RX ORDER — MONTELUKAST SODIUM 4 MG/1
2 TABLET, CHEWABLE ORAL 2 TIMES DAILY
Qty: 120 TABLET | Refills: 5 | Status: SHIPPED | OUTPATIENT
Start: 2023-05-22

## 2023-05-22 NOTE — TELEPHONE ENCOUNTER
Caller: Kesha Cha    Relationship: Self    Best call back number: [phone]    What medication are you requesting:     What are your current symptoms: DIARRHEA    How long have you been experiencing symptoms: OVER A WEEK    Have you had these symptoms before:    [x] Yes  [] No    Have you been treated for these symptoms before:   [x] Yes  [] No    If a prescription is needed, what is your preferred pharmacy and phone number: [facility] [id number] - [address] - [phone] [facility] [phone]      Additional notes:  PATIENT STATES THAT HER CURRENT MEDICATION PRESCRIBED BY DR. LINDA PA FOR DIARRHEA SYMPTOMS HAS STOPPED WORKING.  SHE SAYS THAT SHE HAS HAD DIARRHEA SYMPTOMS FOR ABOUT ONE WEEK WITH TODAY 5/22/23  BEING THE WORST DAY FOR SYMPTOMS.  SHE WOULD LIKE DR. PA TO SEND IN A NEW MEDICATION TO HER PHARMACY TO HELP ALLEVIATE DIARRHEA SYMPTOMS.  SHE SAYS THAT SHE WOULD LIKE TO  THE NEW MEDICATION TODAY AS SHE HAS AN APPOINTMENT TOMORROW.    PLEASE ADVISE.

## 2023-05-22 NOTE — TELEPHONE ENCOUNTER
Caller: Kesha Cha    Relationship: Self    Best call back number: [phone]    What medication are you requesting: PATIENT IS REQUESTING DIFFERENT MEDICATION THEN COLESTIPOL TO BE SENT IN. THAT IS WHAT SHE HAS BEEN TAKING AND IT IS NOT HELPING. PLEASE ADVISE.     If a prescription is needed, what is your preferred pharmacy and phone number: [facility][id number] [place], [place] - [address] AT [facility] [phone] [facility] [phone]      Additional notes: YES, PLEASE CALL AS SOON AS POSSIBLE TO ADVISE.

## 2023-05-22 NOTE — TELEPHONE ENCOUNTER
Pt having diarrhea needs something called or sent to pharmacy has had since last week needs to be sent to [facility]

## 2023-05-30 ENCOUNTER — TELEPHONE (OUTPATIENT)
Dept: GASTROENTEROLOGY | Facility: CLINIC | Age: 78
End: 2023-05-30

## 2023-05-30 NOTE — TELEPHONE ENCOUNTER
Patient called stating that she has been having blood in her stools every day for the last months (couldn't specify more), and requested an appointment with BRAXTON TIERNEY in [facility] to evaluate this problem.  Pt was scheduled for 06/30/2023.

## 2023-06-01 DIAGNOSIS — R79.89 ELEVATED LFTS: ICD-10-CM

## 2023-06-01 RX ORDER — MONTELUKAST SODIUM 4 MG/1
2 TABLET, CHEWABLE ORAL 2 TIMES DAILY
Qty: 120 TABLET | Refills: 5 | Status: SHIPPED | OUTPATIENT
Start: 2023-06-01

## 2023-06-01 NOTE — TELEPHONE ENCOUNTER
Caller: Kesha Cha    Relationship: Self    Best call back number:[phone]    Requested Prescriptions:   Requested Prescriptions     Pending Prescriptions Disp Refills   • colestipol (COLESTID) 1 g tablet 120 tablet 5     Sig: Take 2 tablets by mouth 2 (Two) Times a Day.        Pharmacy where request should be sent: [facility] - [place] [address] [address] - [phone] [facility] [phone] FX     Last office visit with prescribing clinician: 5/18/2023   Last telemedicine visit with prescribing clinician: Visit date not found   Next office visit with prescribing clinician: 8/18/2023     Additional details provided by patient: N/A    Does the patient have less than a 3 day supply:  [] Yes  [] No    Would you like a call back once the refill request has been completed: [] Yes [x] No    If the office needs to give you a call back, can they leave a voicemail: [] Yes [x] No    Triston Schuster Rep   06/01/23 09:51 EDT

## 2023-07-24 ENCOUNTER — TELEPHONE (OUTPATIENT)
Dept: FAMILY MEDICINE CLINIC | Facility: CLINIC | Age: 78
End: 2023-07-24
Payer: MEDICARE

## 2023-07-24 NOTE — TELEPHONE ENCOUNTER
Caller: Semaj Kesha MONTAGUE    Relationship: Self    Best call back number: [phone]     What medications are you currently taking:   Current Outpatient Medications on File Prior to Visit   Medication Sig Dispense Refill    colestipol (COLESTID) 1 g tablet Take 2 tablets by mouth 2 (Two) Times a Day. 120 tablet 11    Loratadine 10 MG capsule Take 1 capsule by mouth Daily. 90 each 3    memantine (Namenda) 10 MG tablet 1 twice daily 180 tablet 1    methylPREDNISolone (MEDROL) 4 MG dose pack Take as directed on package instructions. 21 tablet 0    metoprolol succinate XL (TOPROL-XL) 25 MG 24 hr tablet Take 1.5 tablets by mouth Daily.      metroNIDAZOLE (METROGEL VAGINAL) 0.75 % vaginal gel Insert  into the vagina 2 (Two) Times a Day. 70 g 0    multivitamin with minerals tablet tablet Take 1 tablet by mouth Daily.      onabotulinumtoxina (BOTOX) 100 UNITS reconstituted solution injection Inject as directed       No current facility-administered medications on file prior to visit.          When did you start taking these medications: 6/30/23    Which medication are you concerned about: colestipol (COLESTID) 1 g tablet     Who prescribed you this medication: DR. LINDA PA     What are your concerns: PATIENT STATES THAT THIS MEDICATION ISN'T WORKING AS WELL AS IT USE TO. IS WANTING TO KNOW IF THERE IS ANOTHER MEDICATION THAT DR. PA COULD PRESCRIBE TO SUBSTITUTE

## 2023-07-31 ENCOUNTER — TELEPHONE (OUTPATIENT)
Dept: FAMILY MEDICINE CLINIC | Facility: CLINIC | Age: 78
End: 2023-07-31
Payer: MEDICARE

## 2023-07-31 DIAGNOSIS — R79.89 ELEVATED LFTS: ICD-10-CM

## 2023-07-31 RX ORDER — MONTELUKAST SODIUM 4 MG/1
3 TABLET, CHEWABLE ORAL 2 TIMES DAILY
Qty: 540 TABLET | Refills: 3 | Status: SHIPPED | OUTPATIENT
Start: 2023-07-31

## 2023-08-18 ENCOUNTER — OFFICE VISIT (OUTPATIENT)
Dept: FAMILY MEDICINE CLINIC | Facility: CLINIC | Age: 78
End: 2023-08-18
Payer: MEDICARE

## 2023-08-18 VITALS
OXYGEN SATURATION: 98 % | WEIGHT: 91 LBS | SYSTOLIC BLOOD PRESSURE: 90 MMHG | HEIGHT: 64 IN | HEART RATE: 66 BPM | BODY MASS INDEX: 15.54 KG/M2 | DIASTOLIC BLOOD PRESSURE: 58 MMHG

## 2023-08-18 DIAGNOSIS — R15.2 INCONTINENCE OF FECES WITH FECAL URGENCY: ICD-10-CM

## 2023-08-18 DIAGNOSIS — R15.9 INCONTINENCE OF FECES WITH FECAL URGENCY: ICD-10-CM

## 2023-08-18 DIAGNOSIS — Z00.00 MEDICARE ANNUAL WELLNESS VISIT, SUBSEQUENT: Primary | ICD-10-CM

## 2023-08-18 PROBLEM — F03.90 DEMENTIA: Status: ACTIVE | Noted: 2023-04-27

## 2023-08-18 PROCEDURE — 3074F SYST BP LT 130 MM HG: CPT | Performed by: FAMILY MEDICINE

## 2023-08-18 PROCEDURE — 1160F RVW MEDS BY RX/DR IN RCRD: CPT | Performed by: FAMILY MEDICINE

## 2023-08-18 PROCEDURE — 1159F MED LIST DOCD IN RCRD: CPT | Performed by: FAMILY MEDICINE

## 2023-08-18 PROCEDURE — 99213 OFFICE O/P EST LOW 20 MIN: CPT | Performed by: FAMILY MEDICINE

## 2023-08-18 PROCEDURE — 1170F FXNL STATUS ASSESSED: CPT | Performed by: FAMILY MEDICINE

## 2023-08-18 PROCEDURE — 3078F DIAST BP <80 MM HG: CPT | Performed by: FAMILY MEDICINE

## 2023-08-18 PROCEDURE — G0439 PPPS, SUBSEQ VISIT: HCPCS | Performed by: FAMILY MEDICINE

## 2023-08-18 RX ORDER — MONTELUKAST SODIUM 4 MG/1
3 TABLET, CHEWABLE ORAL 2 TIMES DAILY
Qty: 540 TABLET | Refills: 3 | Status: SHIPPED | OUTPATIENT
Start: 2023-08-18

## 2023-08-18 NOTE — PATIENT INSTRUCTIONS
Advance Care Planning and Advance Directives     You make decisions on a daily basis - decisions about where you want to live, your career, your home, your life. Perhaps one of the most important decisions you face is your choice for future medical care. Take time to talk with your family and your healthcare team and start planning today.  Advance Care Planning is a process that can help you:  Understand possible future healthcare decisions in light of your own experiences  Reflect on those decision in light of your goals and values  Discuss your decisions with those closest to you and the healthcare professionals that care for you  Make a plan by creating a document that reflects your wishes    Surrogate Decision Maker  In the event of a medical emergency, which has left you unable to communicate or to make your own decisions, you would need someone to make decisions for you.  It is important to discuss your preferences for medical treatment with this person while you are in good health.     Qualities of a surrogate decision maker:  Willing to take on this role and responsibility  Knows what you want for future medical care  Willing to follow your wishes even if they don't agree with them  Able to make difficult medical decisions under stressful circumstances    Advance Directives  These are legal documents you can create that will guide your healthcare team and decision maker(s) when needed. These documents can be stored in the electronic medical record.    Living Will - a legal document to guide your care if you have a terminal condition or a serious illness and are unable to communicate. States vary by statute in document names/types, but most forms may include one or more of the following:        -  Directions regarding life-prolonging treatments        -  Directions regarding artificially provided nutrition/hydration        -  Choosing a healthcare decision maker        -  Direction regarding organ/tissue  donation    Durable Power of  for Healthcare - this document names an -in-fact to make medical decisions for you, but it may also allow this person to make personal and financial decisions for you. Please seek the advice of an  if you need this type of document.    **Advance Directives are not required and no one may discriminate against you if you do not sign one.    Medical Orders  Many states allow specific forms/orders signed by your physician to record your wishes for medical treatment in your current state of health. This form, signed in personal communication with your physician, addresses resuscitation and other medical interventions that you may or may not want.      For more information or to schedule a time with a [facility] Advance Care Planning Facilitator contact: [email or web address] or call [phone] and someone will contact you directly.

## 2023-08-18 NOTE — PROGRESS NOTES
QUICK REFERENCE INFORMATION:  The ABCs of the Annual Wellness Visit    Subsequent Medicare Wellness Visit    HEALTH RISK ASSESSMENT    1945    Recent Hospitalizations:  No hospitalization(s) within the last year..        Current Medical Providers:  Patient Care Team:  Felipe Owens DO as PCP - General  Mine Freedman APRN as Nurse Practitioner (Gastroenterology)        Smoking Status:  Social History     Tobacco Use   Smoking Status Former    Types: Cigarettes    Start date: 1958    Quit date: 1969    Years since quittin.6   Smokeless Tobacco Never       Alcohol Consumption:  Social History     Substance and Sexual Activity   Alcohol Use No       Depression Screen:       2023    10:00 AM   PHQ-2/PHQ-9 Depression Screening   Little Interest or Pleasure in Doing Things 0-->not at all   Feeling Down, Depressed or Hopeless 0-->not at all   PHQ-9: Brief Depression Severity Measure Score 0       Health Habits and Functional and Cognitive Screenin/18/2023    10:00 AM   Functional & Cognitive Status   Do you have difficulty preparing food and eating? No   Do you have difficulty bathing yourself, getting dressed or grooming yourself? No   Do you have difficulty using the toilet? No   Do you have difficulty moving around from place to place? No   Do you have trouble with steps or getting out of a bed or a chair? No   Current Diet Well Balanced Diet   Dental Exam Not up to date   Eye Exam Not up to date   Exercise (times per week) 0 times per week   Current Exercises Include No Regular Exercise   Do you need help using the phone?  No   Are you deaf or do you have serious difficulty hearing?  Yes   Do you need help to go to places out of walking distance? No   Do you need help shopping? No   Do you need help preparing meals?  No   Do you need help with housework?  No   Do you need help with laundry? No   Do you need help taking your medications? No   Do you need help managing money? No    Do you ever drive or ride in a car without wearing a seat belt? No   Have you felt unusual stress, anger or loneliness in the last month? No   Who do you live with? Alone   If you need help, do you have trouble finding someone available to you? No   Have you been bothered in the last four weeks by sexual problems? No   Do you have difficulty concentrating, remembering or making decisions? No           Does the patient have evidence of cognitive impairment? No    Aspirin use counseling: Does not need ASA (and currently is not on it)      Recent Lab Results:  CMP:  Lab Results   Component Value Date    GLU 80 09/17/2019    BUN 15 05/18/2023    CREATININE 0.66 05/18/2023    EGFRIFNONA 76 07/27/2021    EGFRIFAFRI 88 07/27/2021    BCR 22.7 05/18/2023     05/18/2023    K 3.9 05/18/2023    CO2 29.7 (H) 05/18/2023    CALCIUM 9.6 05/18/2023    PROTENTOTREF 6.3 05/18/2023    ALBUMIN 4.4 05/18/2023    LABGLOBREF 1.9 05/18/2023    LABIL2 2.3 05/18/2023    BILITOT 0.4 05/18/2023    ALKPHOS 88 05/18/2023    AST 34 (H) 05/18/2023    ALT 25 05/18/2023     Lipid Panel:  Lab Results   Component Value Date    TRIG 62 08/02/2022    HDL 77 08/02/2022    VLDL 13 08/02/2022     HbA1c:       Visual Acuity:  No results found.    Age-appropriate Screening Schedule:  Refer to the list below for future screening recommendations based on patient's age, sex and/or medical conditions. Orders for these recommended tests are listed in the plan section. The patient has been provided with a written plan.    Health Maintenance   Topic Date Due    HEPATITIS C SCREENING  Never done    ZOSTER VACCINE (2 of 2) 12/27/2016    LIPID PANEL  08/02/2023    COVID-19 Vaccine (1) 08/20/2023 (Originally 6/7/1946)    INFLUENZA VACCINE  10/01/2023    DXA SCAN  12/07/2023    ANNUAL WELLNESS VISIT  08/18/2024    TDAP/TD VACCINES (5 - Td or Tdap) 06/13/2031    Pneumococcal Vaccine 65+  Completed    COLORECTAL CANCER SCREENING  Discontinued        Subjective    History of Present Illness    Kesha Cha is a 77 y.o. female who presents for an Subsequent Wellness Visit.    The following portions of the patient's history were reviewed and updated as appropriate: allergies, current medications, past family history, past medical history, past social history, past surgical history, and problem list.    Outpatient Medications Prior to Visit   Medication Sig Dispense Refill    metoprolol succinate XL (TOPROL-XL) 25 MG 24 hr tablet Take 1.5 tablets by mouth Daily.      multivitamin with minerals tablet tablet Take 1 tablet by mouth Daily.      onabotulinumtoxina (BOTOX) 100 UNITS reconstituted solution injection Inject as directed      colestipol (COLESTID) 1 g tablet Take 3 tablets by mouth 2 (Two) Times a Day. (Patient taking differently: Take 3 tablets by mouth Daily.) 540 tablet 3    Loratadine 10 MG capsule Take 1 capsule by mouth Daily. (Patient not taking: Reported on 8/18/2023) 90 each 3    memantine (Namenda) 10 MG tablet 1 twice daily (Patient not taking: Reported on 8/18/2023) 180 tablet 1    methylPREDNISolone (MEDROL) 4 MG dose pack Take as directed on package instructions. (Patient not taking: Reported on 8/18/2023) 21 tablet 0    metroNIDAZOLE (METROGEL VAGINAL) 0.75 % vaginal gel Insert  into the vagina 2 (Two) Times a Day. (Patient not taking: Reported on 8/18/2023) 70 g 0     No facility-administered medications prior to visit.       Patient Active Problem List   Diagnosis    Atopic rhinitis    Acute otitis media    Isolated cervical dystonia    Radial styloid tenosynovitis    Degeneration of intervertebral disc of lumbar region    Disorder of jaw    Diverticulosis of intestine    Dyslipidemia    Dysphagia    Idiopathic torsion dystonia    Post-menopausal osteoporosis    Osteoporosis    Palpitations    Scoliosis    Arthralgia of temporomandibular joint    Upper respiratory tract infection    Atypical chest pain    Chest pain    Dystonia    Fall    Closed  "nondisplaced fracture of right pubis    Closed fracture of sacrum    Elevated LFTs    SOB (shortness of breath)    Abnormal CT of the chest    Coronary artery disease    Hyperlipidemia    PVC's (premature ventricular contractions)    Diarrhea    History of constipation    Fecal soiling due to fecal incontinence    Weight loss    Hypertension    Dementia       Advance Care Planning:  ACP discussion was held with the patient during this visit. Patient does not have an advance directive, information provided.    Identification of Risk Factors:  Risk factors include: Fall Risk.    Review of Systems   Respiratory:  Negative for shortness of breath.    Gastrointestinal:  Positive for diarrhea. Negative for abdominal pain, nausea and vomiting.     Compared to one year ago, the patient feels her physical health is the same.  Compared to one year ago, the patient feels her mental health is the same.    Objective     Physical Exam  Vitals and nursing note reviewed.   Constitutional:       Appearance: Normal appearance. She is not toxic-appearing or diaphoretic.   HENT:      Head: Normocephalic and atraumatic.   Musculoskeletal:      Cervical back: Neck supple.   Skin:     General: Skin is warm and dry.      Capillary Refill: Capillary refill takes less than 2 seconds.   Neurological:      Mental Status: She is alert.   Psychiatric:         Mood and Affect: Mood normal.         Behavior: Behavior normal.       Vitals:    08/18/23 1031   BP: 90/58   Pulse: 66   SpO2: 98%   Weight: 41.3 kg (91 lb)   Height: 162.6 cm (64\")   PainSc:   2   PainLoc: Neck     Body mass index is 15.62 kg/mý.    BMI is below normal parameters (malnutrition). Recommendations: workin apolonia it      Assessment & Plan   Patient Self-Management and Personalized Health Advice  The patient has been provided with information about: diet and preventive services including:   Annual Wellness Visit (AWV).    Visit Diagnoses:    ICD-10-CM ICD-9-CM   1. Medicare " annual wellness visit, subsequent  Z00.00 V70.0   2. Incontinence of feces with fecal urgency  R15.9 787.63    R15.2        No orders of the defined types were placed in this encounter.      Outpatient Encounter Medications as of 8/18/2023   Medication Sig Dispense Refill    colestipol (COLESTID) 1 g tablet Take 3 tablets by mouth 2 (Two) Times a Day. 540 tablet 3    metoprolol succinate XL (TOPROL-XL) 25 MG 24 hr tablet Take 1.5 tablets by mouth Daily.      multivitamin with minerals tablet tablet Take 1 tablet by mouth Daily.      onabotulinumtoxina (BOTOX) 100 UNITS reconstituted solution injection Inject as directed      [DISCONTINUED] colestipol (COLESTID) 1 g tablet Take 3 tablets by mouth 2 (Two) Times a Day. (Patient taking differently: Take 3 tablets by mouth Daily.) 540 tablet 3    [DISCONTINUED] Loratadine 10 MG capsule Take 1 capsule by mouth Daily. (Patient not taking: Reported on 8/18/2023) 90 each 3    [DISCONTINUED] memantine (Namenda) 10 MG tablet 1 twice daily (Patient not taking: Reported on 8/18/2023) 180 tablet 1    [DISCONTINUED] methylPREDNISolone (MEDROL) 4 MG dose pack Take as directed on package instructions. (Patient not taking: Reported on 8/18/2023) 21 tablet 0    [DISCONTINUED] metroNIDAZOLE (METROGEL VAGINAL) 0.75 % vaginal gel Insert  into the vagina 2 (Two) Times a Day. (Patient not taking: Reported on 8/18/2023) 70 g 0     No facility-administered encounter medications on file as of 8/18/2023.       Reviewed use of high risk medication in the elderly: yes  Reviewed for potential of harmful drug interactions in the elderly: yes    Follow Up:  Return in about 3 months (around 11/18/2023), or if symptoms worsen or fail to improve.     An After Visit Summary and PPPS with all of these plans were given to the patient.           ++++++++++++++++++++++++++++++++++++++++++++++++++++++++++++++++++     Chief Complaint   Patient presents with    Medicare Wellness-subsequent    Diarrhea  "    Diarrhea   Pertinent negatives include no abdominal pain or vomiting.   Patinet with fecal urgency an doccly soils pants;  no blood in stool;  colestipo.l hel.ps, taking 3 in am only.       Review of Systems   Respiratory:  Negative for shortness of breath.    Gastrointestinal:  Positive for diarrhea. Negative for abdominal pain, nausea and vomiting.     Social History     Tobacco Use    Smoking status: Former     Types: Cigarettes     Start date: 1958     Quit date: 1969     Years since quittin.6    Smokeless tobacco: Never   Substance Use Topics    Alcohol use: No     O:   Vitals:    23 1031   BP: 90/58   Pulse: 66   SpO2: 98%   Weight: 41.3 kg (91 lb)   Height: 162.6 cm (64\")   PainSc:   2   PainLoc: Neck     Body mass index is 15.62 kg/mý.  Vitals and nursing note reviewed.   Constitutional:       Appearance: Normal appearance. Not toxic-appearing or diaphoretic.   HENT:      Head: Normocephalic and atraumatic.   Musculoskeletal:      Cervical back: Neck supple. Skin:     General: Skin is warm and dry.      Capillary Refill: Capillary refill takes less than 2 seconds.   Neurological:      Mental Status: Alert.   Psychiatric:         Mood and Affect: Mood normal.         Behavior: Behavior normal.       Diagnoses and all orders for this visit:    1. Medicare annual wellness visit, subsequent (Primary)    2. Incontinence of feces with fecal urgency  -     colestipol (COLESTID) 1 g tablet; Take 3 tablets by mouth 2 (Two) Times a Day.  Dispense: 540 tablet; Refill: 3    Increase colestipol to 3 tabs  bid as only taking qd    Return in about 3 months (around 2023), or if symptoms worsen or fail to improve.    " Yes

## 2023-08-28 ENCOUNTER — TELEPHONE (OUTPATIENT)
Dept: FAMILY MEDICINE CLINIC | Facility: CLINIC | Age: 78
End: 2023-08-28
Payer: MEDICARE

## 2023-08-28 DIAGNOSIS — R15.2 FECAL URGENCY: Primary | ICD-10-CM

## 2023-08-28 NOTE — TELEPHONE ENCOUNTER
Caller: Kesha Cha    Relationship: Self    Best call back number: [phone]     What is the best time to reach you: ANY TIME     Who are you requesting to speak with (clinical staff, provider,  specific staff member): CLINICAL STAFF    What was the call regarding: PATIENT STATES THAT SHE HAS HAD DIARRHEA FOR APPROXIMATELY A WEEK AND WANTS TO KNOW IF DR. LINDA PA WOULD LIKE TO SWITCH HER TO A DIFFERENT MEDICATION.  SHE REQUESTS A CALLBACK TO DISCUSS.  PATIENT DECLINED TO MAKE AN OFFICE VISIT.    Is it okay if the provider responds through [employer]:     PLEASE ADVISE.

## 2023-08-29 RX ORDER — CHOLESTYRAMINE LIGHT 4 G/5.7G
4 POWDER, FOR SUSPENSION ORAL 2 TIMES DAILY
Qty: 60 PACKET | Refills: 5 | Status: SHIPPED | OUTPATIENT
Start: 2023-08-29

## 2023-08-29 NOTE — TELEPHONE ENCOUNTER
Stop colestipol and try cholestyramine.  I sent to pharmacy.  It is a powder, dissolve in water and drink twice daily.  RRJ

## 2023-08-31 ENCOUNTER — TELEPHONE (OUTPATIENT)
Dept: FAMILY MEDICINE CLINIC | Facility: CLINIC | Age: 78
End: 2023-08-31
Payer: MEDICARE

## 2023-08-31 NOTE — TELEPHONE ENCOUNTER
Caller: Kesha Cha    Relationship to patient: Self    Best call back number: [phone]     Chief complaint: BLOOD IN RECTAL AREA     Patient directed to call 911 or go to their nearest emergency room.     Patient verbalized understanding: [x] Yes  [] No

## 2023-08-31 NOTE — TELEPHONE ENCOUNTER
I spoke with pt and and said she doesn't have blood in her stool but did have a bowel movement on herself. I read RRJ's previous message about med change and told her to eat soft easy foods to digest, drink clear liquids and avoid dairy for a week. She said she will and is going to call the pharmacy to see if her med is ready for

## 2023-09-06 ENCOUNTER — TELEPHONE (OUTPATIENT)
Dept: FAMILY MEDICINE CLINIC | Facility: CLINIC | Age: 78
End: 2023-09-06
Payer: MEDICARE

## 2023-09-06 NOTE — TELEPHONE ENCOUNTER
Caller: Kesha Cha    Relationship: Self    Best call back number: [phone]    What is the best time to reach you: ANY    Who are you requesting to speak with (clinical staff, provider,  specific staff member): CLINICAL        What was the call regarding: PATIENT WANTED TO KNOW IF THE PREVALITE IS FOR DIARRHEA ?    PLEASE CALL AND ADVISE SHE WANTS TO BE SURE BEFORE SHE TAKES IT.

## 2023-11-30 ENCOUNTER — OFFICE VISIT (OUTPATIENT)
Dept: FAMILY MEDICINE CLINIC | Facility: CLINIC | Age: 78
End: 2023-11-30
Payer: MEDICARE

## 2023-11-30 VITALS
DIASTOLIC BLOOD PRESSURE: 66 MMHG | OXYGEN SATURATION: 92 % | WEIGHT: 90 LBS | HEART RATE: 68 BPM | SYSTOLIC BLOOD PRESSURE: 112 MMHG | BODY MASS INDEX: 15.36 KG/M2 | HEIGHT: 64 IN

## 2023-11-30 DIAGNOSIS — I10 PRIMARY HYPERTENSION: Primary | ICD-10-CM

## 2023-11-30 DIAGNOSIS — K62.5 BRBPR (BRIGHT RED BLOOD PER RECTUM): ICD-10-CM

## 2023-11-30 NOTE — PROGRESS NOTES
Subjective   Kesha Cha is a 77 y.o. female. Presents today for   Chief Complaint   Patient presents with    Hypertension       History of Present Illness  Patient 76 y/o with htn, well controlled;  no cp/soa;  reports still blood when wipes; no pain;  no incontinence;   denies hemorroids;   was to have c-scope, looks liek case request placed, but not scheduled.    Review of Systems   Respiratory:  Negative for shortness of breath.    Cardiovascular:  Negative for chest pain, palpitations and leg swelling.       Patient Active Problem List   Diagnosis    Atopic rhinitis    Acute otitis media    Isolated cervical dystonia    Radial styloid tenosynovitis    Degeneration of intervertebral disc of lumbar region    Disorder of jaw    Diverticulosis of intestine    Dyslipidemia    Dysphagia    Idiopathic torsion dystonia    Post-menopausal osteoporosis    Osteoporosis    Palpitations    Scoliosis    Arthralgia of temporomandibular joint    Upper respiratory tract infection    Atypical chest pain    Chest pain    Dystonia    Fall    Closed nondisplaced fracture of right pubis    Closed fracture of sacrum    Elevated LFTs    SOB (shortness of breath)    Abnormal CT of the chest    Coronary artery disease    Hyperlipidemia    PVC's (premature ventricular contractions)    Diarrhea    History of constipation    Fecal soiling due to fecal incontinence    Weight loss    Hypertension    Dementia       Social History     Socioeconomic History    Marital status:    Tobacco Use    Smoking status: Former     Types: Cigarettes     Start date: 1958     Quit date: 1969     Years since quittin.9    Smokeless tobacco: Never   Vaping Use    Vaping Use: Never used   Substance and Sexual Activity    Alcohol use: No    Drug use: No    Sexual activity: Not Currently     Partners: Male       Allergies   Allergen Reactions    Aricept [Donepezil] Diarrhea       Current Outpatient Medications on File Prior to Visit  "  Medication Sig Dispense Refill    cholestyramine light (Prevalite) 4 g packet Take 1 packet by mouth 2 (Two) Times a Day. 60 packet 5    metoprolol succinate XL (TOPROL-XL) 25 MG 24 hr tablet Take 1.5 tablets by mouth Daily.      multivitamin with minerals tablet tablet Take 1 tablet by mouth Daily.      onabotulinumtoxina (BOTOX) 100 UNITS reconstituted solution injection Inject as directed       No current facility-administered medications on file prior to visit.       Objective   Vitals:    11/30/23 1118   BP: 112/66   Pulse: 68   SpO2: 92%   Weight: 40.8 kg (90 lb)   Height: 162.6 cm (64\")     Body mass index is 15.45 kg/m².    Physical Exam  Vitals and nursing note reviewed.   Constitutional:       Appearance: She is well-developed.   HENT:      Head: Normocephalic and atraumatic.   Neck:      Thyroid: No thyromegaly.      Vascular: No JVD.   Cardiovascular:      Rate and Rhythm: Normal rate and regular rhythm.      Heart sounds: Normal heart sounds. No murmur heard.     No friction rub. No gallop.   Pulmonary:      Effort: Pulmonary effort is normal. No respiratory distress.      Breath sounds: Normal breath sounds. No wheezing or rales.   Abdominal:      General: Bowel sounds are normal. There is no distension.      Palpations: Abdomen is soft.      Tenderness: There is no abdominal tenderness. There is no guarding or rebound.   Musculoskeletal:      Cervical back: Neck supple.   Skin:     General: Skin is warm and dry.   Neurological:      Mental Status: She is alert.   Psychiatric:         Behavior: Behavior normal.         Assessment & Plan   Diagnoses and all orders for this visit:    1. Primary hypertension (Primary)    2. BRBPR (bright red blood per rectum)  -     Ambulatory Referral For Screening Colonoscopy  -     Comprehensive Metabolic Panel  -     CBC (No Diff)    Check labs and refer for c-scope scope  -hypertension - controlled, continue medications                   -Follow up:   "

## 2023-12-01 LAB
ALBUMIN SERPL-MCNC: 4.3 G/DL (ref 3.8–4.8)
ALBUMIN/GLOB SERPL: 2 {RATIO} (ref 1.2–2.2)
ALP SERPL-CCNC: 81 IU/L (ref 44–121)
ALT SERPL-CCNC: 23 IU/L (ref 0–32)
AST SERPL-CCNC: 29 IU/L (ref 0–40)
BILIRUB SERPL-MCNC: 0.5 MG/DL (ref 0–1.2)
BUN SERPL-MCNC: 10 MG/DL (ref 8–27)
BUN/CREAT SERPL: 14 (ref 12–28)
CALCIUM SERPL-MCNC: 9.3 MG/DL (ref 8.7–10.3)
CHLORIDE SERPL-SCNC: 100 MMOL/L (ref 96–106)
CO2 SERPL-SCNC: 28 MMOL/L (ref 20–29)
CREAT SERPL-MCNC: 0.69 MG/DL (ref 0.57–1)
EGFRCR SERPLBLD CKD-EPI 2021: 88 ML/MIN/1.73
ERYTHROCYTE [DISTWIDTH] IN BLOOD BY AUTOMATED COUNT: 13.6 % (ref 11.7–15.4)
GLOBULIN SER CALC-MCNC: 2.2 G/DL (ref 1.5–4.5)
GLUCOSE SERPL-MCNC: 78 MG/DL (ref 70–99)
HCT VFR BLD AUTO: 39.3 % (ref 34–46.6)
HGB BLD-MCNC: 13.1 G/DL (ref 11.1–15.9)
MCH RBC QN AUTO: 32 PG (ref 26.6–33)
MCHC RBC AUTO-ENTMCNC: 33.3 G/DL (ref 31.5–35.7)
MCV RBC AUTO: 96 FL (ref 79–97)
PLATELET # BLD AUTO: 188 X10E3/UL (ref 150–450)
POTASSIUM SERPL-SCNC: 3.8 MMOL/L (ref 3.5–5.2)
PROT SERPL-MCNC: 6.5 G/DL (ref 6–8.5)
RBC # BLD AUTO: 4.1 X10E6/UL (ref 3.77–5.28)
SODIUM SERPL-SCNC: 141 MMOL/L (ref 134–144)
WBC # BLD AUTO: 9.9 X10E3/UL (ref 3.4–10.8)

## 2023-12-05 ENCOUNTER — TELEPHONE (OUTPATIENT)
Dept: GASTROENTEROLOGY | Facility: CLINIC | Age: 78
End: 2023-12-05
Payer: MEDICARE

## 2023-12-05 NOTE — TELEPHONE ENCOUNTER
----- Message from Lani Argueta sent at 12/1/2023  1:31 PM EST -----  Regarding: SCHEDULE CLS (RETURNING MW CALL)

## 2023-12-13 ENCOUNTER — TELEPHONE (OUTPATIENT)
Dept: FAMILY MEDICINE CLINIC | Facility: CLINIC | Age: 78
End: 2023-12-13

## 2023-12-13 RX ORDER — DEXTROMETHORPHAN HYDROBROMIDE AND PROMETHAZINE HYDROCHLORIDE 15; 6.25 MG/5ML; MG/5ML
5 SYRUP ORAL 4 TIMES DAILY PRN
Qty: 180 ML | Refills: 0 | Status: SHIPPED | OUTPATIENT
Start: 2023-12-13

## 2023-12-13 NOTE — TELEPHONE ENCOUNTER
Caller: Kesha Cha    Relationship: Self    Best call back number: [phone]    What medication are you requesting:     What are your current symptoms: COUGH    How long have you been experiencing symptoms: OVER A WEEK    Have you had these symptoms before:    [] Yes  [x] No    Have you been treated for these symptoms before:   [] Yes  [x] No    If a prescription is needed, what is your preferred pharmacy and phone number: [address] [place] [address] [phone] [facility] [phone]      Additional notes:

## 2023-12-13 NOTE — TELEPHONE ENCOUNTER
Caller: Kesha Cha    Relationship: Self    Best call back number: [phone]      What medication are you requesting: NOT SPECIFIED    What are your current symptoms: COUGH    How long have you been experiencing symptoms: APPROXIMATELY 7 DAYS    Have you had these symptoms before:    [x] Yes  [] No    Have you been treated for these symptoms before:   [x] Yes  [] No    If a prescription is needed, what is your preferred pharmacy and phone number: [address] [place] [address] [phone] [facility] [phone]      Additional notes:  PATIENT STATES THAT SHE WOULD LIKE DR. LINDA PA TO SEND A PRESCRIPTION TO HER PHARMACY TO HELP RELIEVE COUGH SYMPTOMS.    SHE REQUESTS A CALLBACK FROM CLINICAL STAFF TO LET HER KNOW IF A PRESCRIPTION IS APPROVED AND SENT TO HER PHARMACY.    PLEASE ADVISE.

## 2023-12-13 NOTE — TELEPHONE ENCOUNTER
Caller: Kesha Cha     Relationship to patient: Self     Best call back number: [phone]     Patient is needing: THE PATIENT CALLED BACK IN TO REQUEST THAT A PRESCRIPTION FOR THIS COUGH BE SENT IN. THE PATIENT THINKS A PRESCRIPTION WOULD BE BETTER THAN AN OTC MEDICATION.

## 2023-12-13 NOTE — TELEPHONE ENCOUNTER
Caller: Kesha Cha    Relationship: Self    Best call back number: [phone]     What was the call regarding: PATIENT IS REQUESTING THIS MESSAGE BE DISREGARDED.    PATIENT STATED SHE WILL TRY OVER THE COUNTER MEDICATIONS.

## 2023-12-13 NOTE — TELEPHONE ENCOUNTER
Caller: Kesha Cha    Relationship to patient: Self    Best call back number: [phone]    Patient is needing: THE PATIENT CALLED BACK IN TO REQUEST THAT A PRESCRIPTION FOR THIS COUGH BE SENT IN. THE PATIENT THINKS A PRESCRIPTION WOULD BE BETTER THAN AN OTC MEDICATION.

## 2023-12-13 NOTE — TELEPHONE ENCOUNTER
Caller: Kesha Cha    Relationship to patient: Self    Best call back number: [phone]    Patient is needing: SHE DOES NOT NEED THIS ANY LONGER.  PLEASE DISREGARD

## 2023-12-14 NOTE — TELEPHONE ENCOUNTER
Called pt to inform her and there was NA and VM was full. I'm sure the pharmacy has already notified her that she has a rxn ready.

## 2023-12-15 ENCOUNTER — TELEPHONE (OUTPATIENT)
Dept: FAMILY MEDICINE CLINIC | Facility: CLINIC | Age: 78
End: 2023-12-15

## 2023-12-15 NOTE — TELEPHONE ENCOUNTER
Caller: Kesha Cha    Relationship: Self    Best call back number: [phone]     Who are you requesting to speak with (clinical staff, provider,  specific staff member): DR. PA     What was the call regarding: PATIENT STATED THAT HER BOWELS ARE NOT FUNCTIONING PROPERLY. PATIENT STATED THAT SHE IS FEELING REALLY BAD AND FEELING FATIGUED AND NO STRENGTH. PLEASE ADVISE.

## 2023-12-18 ENCOUNTER — TELEPHONE (OUTPATIENT)
Dept: FAMILY MEDICINE CLINIC | Facility: CLINIC | Age: 78
End: 2023-12-18
Payer: MEDICARE

## 2023-12-18 RX ORDER — GUAIFENESIN, PSEUDOEPHEDRINE HYDROCHLORIDE 600; 60 MG/1; MG/1
TABLET, EXTENDED RELEASE ORAL
Qty: 40 TABLET | Refills: 0 | Status: SHIPPED | OUTPATIENT
Start: 2023-12-18

## 2023-12-18 NOTE — TELEPHONE ENCOUNTER
Caller: Kesha Cha    Relationship: Self    Best call back number: [phone]    What medication are you requesting: ANYTHING TO HELP WITH CONGESTION     What are your current symptoms: CONGESTION, COUGH     How long have you been experiencing symptoms: OVER A WEEK     If a prescription is needed, what is your preferred pharmacy and phone number: [address] - [phone] [facility] [phone]      Additional notes: PATIENT STATES SHE IS REQUESTING A CALL BACK TO LET HER KNOW IF SHE CAN BE PRESCRIBED ANY MEDICATION.

## 2023-12-18 NOTE — TELEPHONE ENCOUNTER
Pt informed and voiced understanding. She stated she has been watching what she eats and is doing better at the moment. If she changes her mind about GI, she will call us back and let us know.

## 2024-01-28 ENCOUNTER — READMISSION MANAGEMENT (OUTPATIENT)
Dept: CALL CENTER | Facility: HOSPITAL | Age: 79
End: 2024-01-28
Payer: MEDICARE

## 2024-01-28 NOTE — OUTREACH NOTE
Prep Survey      Flowsheet Row Responses   [facility] facility patient discharged from? Non-BH   Is LACE score < 7 ? Non-BH Discharge   Eligibility [facility]   Date of Discharge 01/28/24   Discharge diagnosis Displaced intertrochanteric fracture of left femur, subsequent encounter for closed fracture with routine healing   Does the patient have one of the following disease processes/diagnoses(primary or secondary)? Other   Prep survey completed? Yes            SARAH HERNANDEZ - Registered Nurse

## 2024-01-29 ENCOUNTER — TRANSITIONAL CARE MANAGEMENT TELEPHONE ENCOUNTER (OUTPATIENT)
Dept: CALL CENTER | Facility: HOSPITAL | Age: 79
End: 2024-01-29
Payer: MEDICARE

## 2024-01-29 NOTE — OUTREACH NOTE
Call Center TCM Note      Flowsheet Row Responses   [facility] patient discharged from? Non-   Does the patient have one of the following disease processes/diagnoses(primary or secondary)? Other   TCM attempt successful? Yes   Call start time 1555   Call end time 1605   Discharge diagnosis Displaced intertrochanteric fracture of left femur, subsequent encounter for closed fracture with routine healing   Is patient permission given to speak with other caregiver? Yes   Does the patient have an appointment with their PCP within 7-14 days of discharge? Yes   Has home health visited the patient within 72 hours of discharge? Unsure   Psychosocial issues? No   Did the patient receive a copy of their discharge instructions? Yes   Nursing interventions Reviewed instructions with patient   Is the patient/caregiver able to teach back signs and symptoms related to disease process for when to call PCP? No   Is the patient/caregiver able to teach back signs and symptoms related to disease process for when to call 911? No   Is the patient/caregiver able to teach back the hierarchy of who to call/visit for symptoms/problems? PCP, Specialist, Home health nurse, Urgent Care, ED, 911 No   If the patient is a current smoker, are they able to teach back resources for cessation? Not a smoker   TCM call completed? Yes   Wrap up additional comments D/C DX, Displaced intertrochanteric fracture of left femur, hip nailing. - Pt is very confused. She cannot tell me anything about her hip sx, and asked me when did she have hip sx? ([facility] 12/2023) She does not know anything about her rehab stay. Unaware of 02/02 appt with PCP. Does not know if HH coming in, or if she has correct medications. There is a spouse and friend listed in contacts but no current VERBAL RELEASES. I did try to call both but no answers and pt states no one else would know any answers. Pt even asked me when was she in hospital and what did they do? Very concerned for  her.   Call end time 1605   Would this patient benefit from a Referral to Amb Social Work? Yes   Reason for Social Work Referral Caregiving/Support, Other Social Barriers to Care   Is the patient interested in additional calls from an ambulatory ? Yes   What is the reason the patient needs support from an ACM? Barriers to Care, Caregiving/Support, High Risk For ED/Readmission            Nina Kelly MA    1/29/2024, 16:18 EST

## 2024-01-31 ENCOUNTER — PATIENT OUTREACH (OUTPATIENT)
Dept: CASE MANAGEMENT | Facility: OTHER | Age: 79
End: 2024-01-31
Payer: MEDICARE

## 2024-01-31 ENCOUNTER — TELEPHONE (OUTPATIENT)
Dept: FAMILY MEDICINE CLINIC | Facility: CLINIC | Age: 79
End: 2024-01-31

## 2024-01-31 ENCOUNTER — TELEPHONE (OUTPATIENT)
Dept: CASE MANAGEMENT | Facility: OTHER | Age: 79
End: 2024-01-31
Payer: MEDICARE

## 2024-01-31 RX ORDER — MONTELUKAST SODIUM 4 MG/1
1 TABLET, CHEWABLE ORAL 2 TIMES DAILY
COMMUNITY
Start: 2023-08-07 | End: 2024-01-31 | Stop reason: SDUPTHER

## 2024-01-31 RX ORDER — MONTELUKAST SODIUM 4 MG/1
1 TABLET, CHEWABLE ORAL 2 TIMES DAILY
Qty: 60 TABLET | Refills: 5 | Status: SHIPPED | OUTPATIENT
Start: 2024-01-31

## 2024-01-31 NOTE — OUTREACH NOTE
AMBULATORY CASE MANAGEMENT NOTE    Readmission Management referral for additional HRCM outreach.  Referral accepted by teammate, Zoe.  This RN-ACM providing continuation of outreach efforts in her absence.    Patient Outreach    RN-ACM outreach call to patient was not answered and the voicemail box is full. Patient is noted with diagnoses of Dementia.     A second attempted outreach to patient was made using a [place] number that would be local for patient.  This call was also not answered.  Of note, a PCP appointment that was scheduled for tomorrow, 01/31/24 is marked as canceled by patient. Review of Care Everywhere reflects notations that patient has an adult son that lives nearby and assists.       Care Coordination    RN-ACM care coordination with [facility].  Patient is noted as having been recently discharged from this facility.  Facility staff reviewed the discharge and was able to provide limited information.  Per their report, patient was discharged to home in the care of her spouse.  Notes indicate a home health referral to [facility] was declined due to staffing.  Staff indicate their notes reference [facility] but it is unclear whether a referral was transmitted to the agency.    Care Coordination    RN-ACM care coordination with [facility] ([place]).  Per staff report, there is no record of having received a referral for this patient.        Yazmin TIJERINA  Ambulatory Case Management    1/31/2024, 11:10 EST

## 2024-01-31 NOTE — PROGRESS NOTES
Pt has asked that we call her back next week to schedule appt. She doesn't want to make appt right now and can't come this week.

## 2024-01-31 NOTE — TELEPHONE ENCOUNTER
Caller: Kesha Cha    Relationship: Self    Best call back number: [phone]     What medication are you requesting: MAXIM         If a prescription is needed, what is your preferred pharmacy and phone number: [address] [facility] - [phone] [facility] [phone]      Additional notes:    PATIENT HAS BEEN ON THIS MEDICATION, BUT IT IS NOT ON HER MED LIST.

## 2024-01-31 NOTE — TELEPHONE ENCOUNTER
"Readmission Management Referral to High Risk Case Management for additional outreach.    RN-ALIN attempted to reach patient by phone.  The call was not answered and the voice mailbox is full.  The last BH Verbal on file is dated 10/21/22 and has written  \"NO ONE ELSE\" for alternate contacts.   "

## 2024-01-31 NOTE — TELEPHONE ENCOUNTER
Can try taking b12 daily.  Would also do word puzzles, activities to stimulate the mind bascially.  Also recommend walk regularly as aerobic exercise shown to help.  RRJ   [Up to Date] : Up to Date [FreeTextEntry1] : as per mother

## 2024-02-02 ENCOUNTER — PATIENT OUTREACH (OUTPATIENT)
Dept: CASE MANAGEMENT | Facility: OTHER | Age: 79
End: 2024-02-02
Payer: MEDICARE

## 2024-02-02 NOTE — OUTREACH NOTE
"AMBULATORY CASE MANAGEMENT NOTE    Name and Relationship of Patient/Support Person: Kesha Cha R - Self    Referral Source:  Readmission Management      Patient Outreach    RN-ACM outreach with patient.  Previous outreach attempts had been unsuccessful.  Patient was referred to HRCM-Nursing and CM-Social Work for additional follow up by Readmission Management.  Patient was discharged to home from [facility] ([phone]) on or about 01/28/24.  Most recent physician notes available from Care Everywhere are from [facility] (Viral Morrow -[phone]).  SNF discharge follow-up had been scheduled with patient's PCP but that appointment as well as the rescheduled version were canceled by patient.    Patient was clear in conversation and in bright spirits today.  She spoke of having fallen and injured her leg and arm resulting in medical intervention.  Patient indicated she lives alone but has a friend that comes by daily to check on her.  RN-ACM encouraged patient to reschedule her follow up appointment.  Patient declined assistance in doing so, voicing desire to wait until she is better able to get out and about. RN-ACM asked patient if her son was able to help out when needed.  Patient firmly stated \"no\" and deflected this topic of conversation. Patient again voiced that her friend was available to help any time she needs her.      Patient confirmed she is not receiving home health services.  She does not recall the details of this discussion when she left the SNF.  Patient verbally authorizes RN-ACM to contact the SNF, home health agencies and other healthcare providers as needed but specifically excludes family members and friends from her authorization. Patient confirms that she is estranged from her  and he does not live in her home.     Care Coordination    RN-ACM care coordination with Rose  at [facility].  SS confirmed this conversation that they " were unable to obtain home health services for patient at discharge due to insurance barriers with referrals having been placed and declined by both [facility] & Eva.  Per their report, patient has good days and bad days but was allowed to discharge back to her home with a close friend agreeing to check in on patient and assist as needed.  SS voiced belief that patient is indeed either estranged from her spouse (possibly ex) understood that while he works out of town often he does still assist occasionally.      Yazmin TIJERINA  Ambulatory Case Management    2/2/2024, 11:55 EST

## 2024-02-07 DIAGNOSIS — R63.4 WEIGHT LOSS: ICD-10-CM

## 2024-02-07 DIAGNOSIS — M79.18 RIGHT BUTTOCK PAIN: ICD-10-CM

## 2024-02-07 DIAGNOSIS — M51.36 DEGENERATION OF INTERVERTEBRAL DISC OF LUMBAR REGION: ICD-10-CM

## 2024-02-07 DIAGNOSIS — G24.1 IDIOPATHIC TORSION DYSTONIA: ICD-10-CM

## 2024-02-07 DIAGNOSIS — M81.0 POST-MENOPAUSAL OSTEOPOROSIS: ICD-10-CM

## 2024-02-07 DIAGNOSIS — S42.295D OTHER CLOSED NONDISPLACED FRACTURE OF PROXIMAL END OF LEFT HUMERUS WITH ROUTINE HEALING, SUBSEQUENT ENCOUNTER: ICD-10-CM
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
## 2024-02-07 DIAGNOSIS — S72.142D CLOSED DISPLACED INTERTROCHANTERIC FRACTURE OF LEFT FEMUR WITH ROUTINE HEALING, SUBSEQUENT ENCOUNTER: Primary | ICD-10-CM

## 2024-02-07 DIAGNOSIS — F03.B0 MODERATE DEMENTIA WITHOUT BEHAVIORAL DISTURBANCE, PSYCHOTIC DISTURBANCE, MOOD DISTURBANCE, OR ANXIETY, UNSPECIFIED DEMENTIA TYPE: ICD-10-CM

## 2024-02-07 DIAGNOSIS — I10 PRIMARY HYPERTENSION: ICD-10-CM

## 2024-02-07 DIAGNOSIS — R26.9 ABNORMAL GAIT: ICD-10-CM

## 2024-02-08 ENCOUNTER — TELEPHONE (OUTPATIENT)
Dept: FAMILY MEDICINE CLINIC | Facility: CLINIC | Age: 79
End: 2024-02-08
Payer: MEDICARE

## 2024-02-08 NOTE — TELEPHONE ENCOUNTER
Caller: Kesha Cha    Relationship: Self    Best call back number: [phone]     What medication are you requesting: ALTERNATIVE TO colestipol (COLESTID) 1 g tablet FOR DIARRHEA    What are your current symptoms: DIARRHEA    Have you had these symptoms before:    [x] Yes  [] No    Have you been treated for these symptoms before:   [x] Yes  [] No    If a prescription is needed, what is your preferred pharmacy and phone number: [address] [place] [phone] [facility] [phone]      Additional notes: PATIENT STATED THE colestipol (COLESTID) 1 g tablet DOES NOT SEEM TO HELP MUCH WITH HER DIARRHEA ANYMORE.    PATIENT IS REQUESTING TO KNOW IF THERE IS ANOTHER MEDICATION THAT MAY BE PRESCRIBED FOR THIS.    PLEASE INFORM PATIENT IF A NEW MEDICATION MAY OR MAY NOT BE PRESCRIBED.

## 2024-02-09 ENCOUNTER — TELEPHONE (OUTPATIENT)
Dept: FAMILY MEDICINE CLINIC | Facility: CLINIC | Age: 79
End: 2024-02-09
Payer: MEDICARE

## 2024-02-09 NOTE — TELEPHONE ENCOUNTER
Caller: Moira with [facility]     Moira is concerned seeing this patient for home health. She states it is a liability. Patient is home alone 90% of the time. Her  does not stay at the house. He stays and sleeps at his place of business. Patient is not safe being home alone as there are a lot of hazards. She states that patient can not get around the house, and would not be able to make it out if there was an emergency. Moira states patient really needs a caregiver. She states the patient has kids but are not around to care for her. Patient has not had a bath since the 28th and will stay in the same clothes until someone makes her change. Moira states patient has been out of her Elliquis for 12 days so she called the pharmacy to see if there was any scripts to be picked up and they told her no. She told the patient she needs to come into the office to be seen and patient does not want to.  Moira states she would be glad to go back and work with the patient but does not feel comfortable doing so when patient does not have a full time caregiver.     [phone]   Include Cannula Information In Note?: No

## 2024-02-14 ENCOUNTER — TELEPHONE (OUTPATIENT)
Dept: FAMILY MEDICINE CLINIC | Facility: CLINIC | Age: 79
End: 2024-02-14
Payer: MEDICARE

## 2024-02-21 ENCOUNTER — TELEPHONE (OUTPATIENT)
Dept: FAMILY MEDICINE CLINIC | Facility: CLINIC | Age: 79
End: 2024-02-21
Payer: MEDICARE

## 2024-02-21 NOTE — TELEPHONE ENCOUNTER
Caller: Kesha Cha    Relationship to patient: Self    Best call back number: [phone]    Patient is needing:     WOULD LIKE A CALL TO DISCUSS BLOOD THINNERS, AND IF SHE HAS EVER BEEN PRESCRIBED ONE BEFORE.     WOULD LIKE A CALLBACK.

## 2024-04-25 RX ORDER — MONTELUKAST SODIUM 4 MG/1
2 TABLET, CHEWABLE ORAL 2 TIMES DAILY
Qty: 120 TABLET | Refills: 5 | Status: SHIPPED | OUTPATIENT
Start: 2024-04-25

## 2024-04-26 DIAGNOSIS — R41.89 COGNITIVE DECLINE: ICD-10-CM

## 2024-04-26 RX ORDER — MEMANTINE HYDROCHLORIDE 10 MG/1
TABLET ORAL
Qty: 180 TABLET | Refills: 1 | Status: SHIPPED | OUTPATIENT
Start: 2024-04-26

## 2024-07-26 ENCOUNTER — TELEPHONE (OUTPATIENT)
Dept: FAMILY MEDICINE CLINIC | Facility: CLINIC | Age: 79
End: 2024-07-26

## 2024-07-26 NOTE — TELEPHONE ENCOUNTER
Caller: Kesha Cha    Relationship: Self    Best call back number:     [phone]       What is the best time to reach you: ANY    Who are you requesting to speak with (clinical staff, provider,  specific staff member): PCP    Do you know the name of the person who called:     What was the call regarding: PATIENT WAS TOLD THAT SHE IS DUE FOR A BONE DENSITY SCAN. SHE IS NOT SURE IF SHE IS DUE FOR ONE OR NOT.    Is it okay if the provider responds through MyChart:

## 2024-07-29 DIAGNOSIS — Z78.0 MENOPAUSE: Primary | ICD-10-CM

## 2024-07-29 NOTE — TELEPHONE ENCOUNTER
Called the home number listed and pt's spouse answered the phone. He is not listed on pt's HIPAA form so I was unable to leave any info with him. Tried to call pt's number and got no answer so I mailed a letter letting pt know.

## 2024-08-16 ENCOUNTER — TELEPHONE (OUTPATIENT)
Dept: FAMILY MEDICINE CLINIC | Facility: CLINIC | Age: 79
End: 2024-08-16

## 2024-08-16 RX ORDER — AMOXICILLIN 875 MG/1
875 TABLET, COATED ORAL 2 TIMES DAILY
Qty: 20 TABLET | Refills: 0 | Status: SHIPPED | OUTPATIENT
Start: 2024-08-16

## 2024-11-25 ENCOUNTER — TELEPHONE (OUTPATIENT)
Dept: FAMILY MEDICINE CLINIC | Facility: CLINIC | Age: 79
End: 2024-11-25

## 2024-11-25 RX ORDER — MONTELUKAST SODIUM 4 MG/1
2 TABLET, CHEWABLE ORAL 2 TIMES DAILY
Qty: 120 TABLET | Refills: 5 | OUTPATIENT
Start: 2024-11-25

## 2024-11-25 NOTE — TELEPHONE ENCOUNTER
Caller: Kesha Cha    Relationship: Self    Best call back number:     What is the best time to reach you:     Who are you requesting to speak with (clinical staff, provider,  specific staff member): JENELLE    Do you know the name of the person who called:     What was the call regarding: PATIENT IS CALLING IN TO SPEAK TO SOMEONE ABOUT HER MEDICATION COLESTIPOL SHE WANTS THIS TO GO TO THE [facility]IST[facility]  [address] [facility])    Is it okay if the provider responds through MyChart:

## 2024-11-25 NOTE — TELEPHONE ENCOUNTER
Caller: Kesha Cha    Relationship: Self    Best call back number: [phone]     Requested Prescriptions:   Requested Prescriptions     Pending Prescriptions Disp Refills    colestipol (COLESTID) 1 g tablet 120 tablet 5     Sig: Take 2 tablets by mouth 2 (Two) Times a Day.        Pharmacy where request should be sent: [address] [phone] [facility] [phone]      Last office visit with prescribing clinician: 11/30/2023   Last telemedicine visit with prescribing clinician: Visit date not found   Next office visit with prescribing clinician: Visit date not found     Additional details provided by patient:     Does the patient have less than a 3 day supply:  [] Yes  [x] No    Would you like a call back once the refill request has been completed: [] Yes [x] No    If the office needs to give you a call back, can they leave a voicemail: [] Yes [x] No    Triston Grey Rep   11/25/24 10:00 EST

## 2024-11-26 NOTE — TELEPHONE ENCOUNTER
Called the other number listed in pt's chart and got Zack (spouse). Per HIPAA I let him know that we are trying to get a hold of the patient to schedule an appt so she can get her meds refilled. I did try to call patient back as well but it's going straight to . I asked Zack to please let pt know to call the office and schedule an appt as he was not with her at that moment. If pt calls back, we need her to schedule an appointment for med refills because she hasn't been seen in a year.

## 2024-12-12 ENCOUNTER — TELEPHONE (OUTPATIENT)
Dept: FAMILY MEDICINE CLINIC | Facility: CLINIC | Age: 79
End: 2024-12-12
Payer: MEDICARE

## 2024-12-12 NOTE — TELEPHONE ENCOUNTER
PATIENT CALLED AND STATES SHE HAS HAD DIARRHEA FOR A FEW DAYS. SHE HAS BEEN TAKING     colestipol (COLESTID) 1 g tablet     SHE IS DRINKING AND EATING.    [address] - [phone] [facility] [phone]  [phone]     CALL BACK NUMBER [phone]

## 2024-12-12 NOTE — TELEPHONE ENCOUNTER
Hub to relay    Pt needs appt for refill. She has not been seen in over a year. I left detailed msg informing pt.

## 2024-12-18 ENCOUNTER — TELEPHONE (OUTPATIENT)
Dept: FAMILY MEDICINE CLINIC | Facility: CLINIC | Age: 79
End: 2024-12-18
Payer: MEDICARE

## 2024-12-18 NOTE — TELEPHONE ENCOUNTER
Caller: Kesha Cha    Relationship: Self    Best call back number: [phone]     What orders are you requesting (i.e. lab or imaging):      In what timeframe would the patient need to come in:      Where will you receive your lab/imaging services:      Additional notes:  PATIENT CALLED SHE WANTS TO GET A BOND DENSITY TEST ORDERED AND PLEASE CALL TO LET HER KNOW WHEN AND WHERE.

## 2024-12-23 NOTE — TELEPHONE ENCOUNTER
Hub to relay    She needs an appt.   Even video would be ok.   RRJ     I left detailed msg on vm and asked for her to call back to make appt

## 2025-01-09 ENCOUNTER — TELEPHONE (OUTPATIENT)
Dept: FAMILY MEDICINE CLINIC | Facility: CLINIC | Age: 80
End: 2025-01-09
Payer: MEDICARE

## 2025-01-09 NOTE — TELEPHONE ENCOUNTER
Caller: Kesha Cha    Relationship to patient: Self    Best call back number:[phone]        Patient is needing: SHE SAID SHE NEEDS PRIOR AUTH SENT TO [facility] ON HER MWV ON 3/17/25.

## 2025-01-15 NOTE — TELEPHONE ENCOUNTER
I'm not sure why this ended up in my basket. Looks like she is wanting a prior auth sent for her Medicare Annual Wellness Visit.

## 2025-01-17 NOTE — TELEPHONE ENCOUNTER
I called the patient's insurance [phone] and NO PA is needed for the Medicare Wellness visit on 3/17/25.  I also checked online with [employer] and NO PA is required.    Called patient to adviseBecky ATKINS with details. She is ok to be seen for her visit on 3/17/25.    RELAY

## 2025-05-12 RX ORDER — COLESTIPOL HYDROCHLORIDE 1 G/1
2 TABLET ORAL 2 TIMES DAILY
Qty: 120 TABLET | Refills: 0 | OUTPATIENT
Start: 2025-05-12

## 2025-05-15 RX ORDER — COLESTIPOL HYDROCHLORIDE 1 G/1
2 TABLET ORAL 2 TIMES DAILY
Qty: 120 TABLET | Refills: 0 | OUTPATIENT
Start: 2025-05-15

## 2025-05-15 RX ORDER — COLESTIPOL HYDROCHLORIDE 1 G/1
2 TABLET ORAL 2 TIMES DAILY
Qty: 120 TABLET | Refills: 0 | Status: SHIPPED | OUTPATIENT
Start: 2025-05-15

## 2025-05-15 NOTE — TELEPHONE ENCOUNTER
Caller: Kesha Cha    Relationship: Self    Best call back number: [phone]     Requested Prescriptions:   Requested Prescriptions     Pending Prescriptions Disp Refills    colestipol (COLESTID) 1 g tablet 120 tablet 5     Sig: Take 2 tablets by mouth 2 (Two) Times a Day.        Pharmacy where request should be sent: [address] [phone] [facility] [phone]      Last office visit with prescribing clinician: 11/30/2023   Last telemedicine visit with prescribing clinician: Visit date not found   Next office visit with prescribing clinician: Visit date not found     Additional details provided by patient:  PATIENT IS OUT OF MEDICATION     Does the patient have less than a 3 day supply:  [x] Yes  [] No    Would you like a call back once the refill request has been completed: [] Yes [] No    If the office needs to give you a call back, can they leave a voicemail: [] Yes [] No    Triston Grey Rep   05/15/25 09:38 EDT

## 2025-05-19 ENCOUNTER — OFFICE VISIT (OUTPATIENT)
Dept: FAMILY MEDICINE CLINIC | Facility: CLINIC | Age: 80
End: 2025-05-19
Payer: MEDICARE

## 2025-05-19 VITALS
DIASTOLIC BLOOD PRESSURE: 78 MMHG | SYSTOLIC BLOOD PRESSURE: 110 MMHG | WEIGHT: 103 LBS | BODY MASS INDEX: 17.58 KG/M2 | HEIGHT: 64 IN | HEART RATE: 72 BPM

## 2025-05-19 DIAGNOSIS — E78.2 MIXED HYPERLIPIDEMIA: Primary | ICD-10-CM

## 2025-05-19 PROCEDURE — 1125F AMNT PAIN NOTED PAIN PRSNT: CPT | Performed by: NURSE PRACTITIONER

## 2025-05-19 PROCEDURE — G0439 PPPS, SUBSEQ VISIT: HCPCS | Performed by: NURSE PRACTITIONER

## 2025-05-19 PROCEDURE — 3074F SYST BP LT 130 MM HG: CPT | Performed by: NURSE PRACTITIONER

## 2025-05-19 PROCEDURE — 3078F DIAST BP <80 MM HG: CPT | Performed by: NURSE PRACTITIONER

## 2025-05-19 NOTE — PROGRESS NOTES
Subjective   The ABCs of the Annual Wellness Visit  Medicare Wellness Visit      Kesha Cha is a 79 y.o. patient who presents for a Medicare Wellness Visit.    The following portions of the patient's history were reviewed and   updated as appropriate: allergies, current medications, past family history, past medical history, past social history, past surgical history, and problem list.    Compared to one year ago, the patient's physical   health is the same.  Compared to one year ago, the patient's mental   health is the same.    Recent Hospitalizations:  She was not admitted to the hospital during the last year.     Current Medical Providers:  Patient Care Team:  Felipe Owens DO as PCP - General  Mine Freedman APRN as Nurse Practitioner (Gastroenterology)    Outpatient Medications Prior to Visit   Medication Sig Dispense Refill    amoxicillin (AMOXIL) 875 MG tablet Take 1 tablet by mouth 2 (Two) Times a Day. 20 tablet 0    colestipol (COLESTID) 1 g tablet Take 2 tablets by mouth 2 (Two) Times a Day. 120 tablet 0    memantine (NAMENDA) 10 MG tablet Take 1 tablet by mouth twice daily 180 tablet 1    metoprolol succinate XL (TOPROL-XL) 25 MG 24 hr tablet Take 1.5 tablets by mouth Daily.      multivitamin with minerals tablet tablet Take 1 tablet by mouth Daily.      onabotulinumtoxina (BOTOX) 100 UNITS reconstituted solution injection Inject as directed      promethazine-dextromethorphan (PROMETHAZINE-DM) 6.25-15 MG/5ML syrup Take 5 mL by mouth 4 (Four) Times a Day As Needed for Cough. 180 mL 0    pseudoephedrine-guaifenesin (MUCINEX D)  MG per 12 hr tablet 1 every 12 hours as needed congestion. 40 tablet 0     No facility-administered medications prior to visit.     No opioid medication identified on active medication list. I have reviewed chart for other potential  high risk medication/s and harmful drug interactions in the elderly.      Aspirin is not on active medication list.  Aspirin use  "is not indicated based on review of current medical condition/s. Risk of harm outweighs potential benefits.  .    Patient Active Problem List   Diagnosis    Atopic rhinitis    Acute otitis media    Isolated cervical dystonia    Radial styloid tenosynovitis    Degeneration of intervertebral disc of lumbar region    Disorder of jaw    Diverticulosis of intestine    Dyslipidemia    Dysphagia    Idiopathic torsion dystonia    Post-menopausal osteoporosis    Osteoporosis    Palpitations    Scoliosis    Arthralgia of temporomandibular joint    Upper respiratory tract infection    Atypical chest pain    Chest pain    Dystonia    Fall    Closed nondisplaced fracture of right pubis    Closed fracture of sacrum    Elevated LFTs    SOB (shortness of breath)    Abnormal CT of the chest    Coronary artery disease    Hyperlipidemia    PVC's (premature ventricular contractions)    Diarrhea    History of constipation    Fecal soiling due to fecal incontinence    Weight loss    Hypertension    Dementia     Advance Care Planning Advance Directive is not on file.  ACP discussion was declined by the patient. Patient does not have an advance directive, declines further assistance.            Objective   Vitals:    05/19/25 1348   Weight: 46.7 kg (103 lb)   Height: 162.6 cm (64\")       Estimated body mass index is 17.68 kg/m² as calculated from the following:    Height as of this encounter: 162.6 cm (64\").    Weight as of this encounter: 46.7 kg (103 lb).    BMI is below normal parameters (malnutrition). Recommendations: patient is happy with her weight where it is.           Does the patient have evidence of cognitive impairment?  Yes, she has recall issues.  She does not remember making this appointment and does not know why she is here.                                                                                                Health  Risk Assessment    Smoking Status:  Social History     Tobacco Use   Smoking Status Former    " Current packs/day: 0.00    Types: Cigarettes    Start date: 1958    Quit date: 1969    Years since quittin.4   Smokeless Tobacco Never     Alcohol Consumption:  Social History     Substance and Sexual Activity   Alcohol Use No       Fall Risk Screen  STEADI Fall Risk Assessment has not been completed.    Depression Screening   The PHQ has not been completed during this encounter.     Health Habits and Functional and Cognitive Screenin/18/2023    10:00 AM   Functional & Cognitive Status   Do you have difficulty preparing food and eating? No    Do you have difficulty bathing yourself, getting dressed or grooming yourself? No    Do you have difficulty using the toilet? No    Do you have difficulty moving around from place to place? No    Do you have trouble with steps or getting out of a bed or a chair? No   Current Diet Well Balanced Diet   Dental Exam Not up to date   Eye Exam Not up to date   Exercise (times per week) 0 times per week   Current Exercises Include No Regular Exercise   Do you need help using the phone?  No   Are you deaf or do you have serious difficulty hearing?  Yes   Do you need help to go to places out of walking distance? No   Do you need help shopping? No   Do you need help preparing meals?  No   Do you need help with housework?  No   Do you need help with laundry? No   Do you need help taking your medications? No   Do you need help managing money? No   Do you ever drive or ride in a car without wearing a seat belt? No   Have you felt unusual stress, anger or loneliness in the last month? No   Who do you live with? Alone   If you need help, do you have trouble finding someone available to you? No   Have you been bothered in the last four weeks by sexual problems? No   Do you have difficulty concentrating, remembering or making decisions? No       Data saved with a previous flowsheet row definition           PHQ-9 Depression Screening  Little interest or pleasure in doing  things?  0   Feeling down, depressed, or hopeless?  0   PHQ-2 Total Score     Trouble falling or staying asleep, or sleeping too much?  0   Feeling tired or having little energy?  0   Poor appetite or overeating?  0   Feeling bad about yourself - or that you are a failure or have let yourself or your family down?  0   Trouble concentrating on things, such as reading the newspaper or watching television?  0   Moving or speaking so slowly that other people could have noticed? Or the opposite - being so fidgety or restless that you have been moving around a lot more than usual?    0   Thoughts that you would be better off dead, or of hurting yourself in some way?  0   PHQ-9 Total Score     If you checked off any problems, how difficult have these problems made it for you to do your work, take care of things at home, or get along with other people?    0     PHQ-2 Depression Screening  Little interest or pleasure in doing things?  0   Feeling down, depressed, or hopeless?  0   PHQ-2 Total Score  0         Age-appropriate Screening Schedule:  Refer to the list below for future screening recommendations based on patient's age, sex and/or medical conditions. Orders for these recommended tests are listed in the plan section. The patient has been provided with a written plan.    Health Maintenance List  Health Maintenance   Topic Date Due    ZOSTER VACCINE (2 of 2) 12/27/2016    RSV Vaccine - Adults (1 - 1-dose 75+ series) Never done    LIPID PANEL  08/02/2023    DXA SCAN  12/07/2023    ANNUAL WELLNESS VISIT  08/18/2024    COVID-19 Vaccine (1 - 2024-25 season) Never done    INFLUENZA VACCINE  07/01/2025    TDAP/TD VACCINES (5 - Td or Tdap) 06/13/2031    Pneumococcal Vaccine 50+  Completed    MAMMOGRAM  Discontinued    COLORECTAL CANCER SCREENING  Discontinued                                                                                                                                                CMS Preventative Services  "Quick Reference  Risk Factors Identified During Encounter  None Identified    The above risks/problems have been discussed with the patient.  Pertinent information has been shared with the patient in the After Visit Summary.  An After Visit Summary and PPPS were made available to the patient.    Follow Up:   Next Medicare Wellness visit to be scheduled in 1 year.         Additional E&M Note during same encounter follows:  Patient has additional, significant, and separately identifiable condition(s)/problem(s) that require work above and beyond the Medicare Wellness Visit     Chief Complaint  Med Management and Hypertension    Subjective    Hypertension         The patient presents for an annual wellness visit.    She reports a general state of well-being over the past year, with no hospitalizations during this period. She has not established an advanced directive. Her sleep pattern is regular, and she maintains a healthy appetite. She has not experienced any recent fractures. She is not fasting today. She is currently on metoprolol and does not require any medication refills at this time.    Supplemental Information  She receives Botox treatments for spasms in her face and neck.    MEDICATIONS  Metoprolol          Objective   Vital Signs:  Ht 162.6 cm (64\")   Wt 46.7 kg (103 lb)   BMI 17.68 kg/m²   Physical Exam  HENT:      Head: Normocephalic and atraumatic.      Nose: Nose normal.      Mouth/Throat:      Mouth: Mucous membranes are moist.   Eyes:      Pupils: Pupils are equal, round, and reactive to light.   Cardiovascular:      Rate and Rhythm: Normal rate and regular rhythm.   Pulmonary:      Effort: Pulmonary effort is normal.      Breath sounds: Normal breath sounds.   Abdominal:      General: Abdomen is flat. Bowel sounds are normal.      Palpations: Abdomen is soft.   Musculoskeletal:         General: Normal range of motion.   Skin:     General: Skin is warm and dry.   Neurological:      General: No focal " deficit present.      Mental Status: She is alert.                     Results             Assessment and Plan        1. Annual wellness examination.  She is due for a bone density scan, but she has declined this procedure at this time. She has also declined the RSV and herpes vaccines. Laboratory tests will be conducted today.         Follow Up   No follow-ups on file.  Patient was given instructions and counseling regarding her condition or for health maintenance advice. Please see specific information pulled into the AVS if appropriate.  Patient or patient representative verbalized consent for the use of Ambient Listening during the visit with  KELSY Ibarra for chart documentation. 5/19/2025  16:05 EDT

## 2025-05-20 ENCOUNTER — RESULTS FOLLOW-UP (OUTPATIENT)
Dept: FAMILY MEDICINE CLINIC | Facility: CLINIC | Age: 80
End: 2025-05-20
Payer: MEDICARE

## 2025-05-20 LAB
ALBUMIN SERPL-MCNC: 4.2 G/DL (ref 3.5–5.2)
ALBUMIN/GLOB SERPL: 1.6 G/DL
ALP SERPL-CCNC: 97 U/L (ref 39–117)
ALT SERPL-CCNC: 22 U/L (ref 1–33)
AST SERPL-CCNC: 30 U/L (ref 1–32)
BASOPHILS # BLD AUTO: 0.05 10*3/MM3 (ref 0–0.2)
BASOPHILS NFR BLD AUTO: 0.6 % (ref 0–1.5)
BILIRUB SERPL-MCNC: 0.4 MG/DL (ref 0–1.2)
BUN SERPL-MCNC: 21 MG/DL (ref 8–23)
BUN/CREAT SERPL: 26.3 (ref 7–25)
CALCIUM SERPL-MCNC: 9.3 MG/DL (ref 8.6–10.5)
CHLORIDE SERPL-SCNC: 103 MMOL/L (ref 98–107)
CHOLEST SERPL-MCNC: 196 MG/DL (ref 0–200)
CO2 SERPL-SCNC: 26.2 MMOL/L (ref 22–29)
CREAT SERPL-MCNC: 0.8 MG/DL (ref 0.57–1)
EGFRCR SERPLBLD CKD-EPI 2021: 74.1 ML/MIN/1.73
EOSINOPHIL # BLD AUTO: 0.11 10*3/MM3 (ref 0–0.4)
EOSINOPHIL NFR BLD AUTO: 1.4 % (ref 0.3–6.2)
ERYTHROCYTE [DISTWIDTH] IN BLOOD BY AUTOMATED COUNT: 14.4 % (ref 12.3–15.4)
GLOBULIN SER CALC-MCNC: 2.7 GM/DL
GLUCOSE SERPL-MCNC: 86 MG/DL (ref 65–99)
HCT VFR BLD AUTO: 42.2 % (ref 34–46.6)
HDLC SERPL-MCNC: 72 MG/DL (ref 40–60)
HGB BLD-MCNC: 13.8 G/DL (ref 12–15.9)
IMM GRANULOCYTES # BLD AUTO: 0.03 10*3/MM3 (ref 0–0.05)
IMM GRANULOCYTES NFR BLD AUTO: 0.4 % (ref 0–0.5)
LDLC SERPL CALC-MCNC: 110 MG/DL (ref 0–100)
LYMPHOCYTES # BLD AUTO: 1.33 10*3/MM3 (ref 0.7–3.1)
LYMPHOCYTES NFR BLD AUTO: 16.5 % (ref 19.6–45.3)
MCH RBC QN AUTO: 30.9 PG (ref 26.6–33)
MCHC RBC AUTO-ENTMCNC: 32.7 G/DL (ref 31.5–35.7)
MCV RBC AUTO: 94.4 FL (ref 79–97)
MONOCYTES # BLD AUTO: 0.51 10*3/MM3 (ref 0.1–0.9)
MONOCYTES NFR BLD AUTO: 6.3 % (ref 5–12)
NEUTROPHILS # BLD AUTO: 6.02 10*3/MM3 (ref 1.7–7)
NEUTROPHILS NFR BLD AUTO: 74.8 % (ref 42.7–76)
NRBC BLD AUTO-RTO: 0 /100 WBC (ref 0–0.2)
PLATELET # BLD AUTO: 213 10*3/MM3 (ref 140–450)
POTASSIUM SERPL-SCNC: 4.3 MMOL/L (ref 3.5–5.2)
PROT SERPL-MCNC: 6.9 G/DL (ref 6–8.5)
RBC # BLD AUTO: 4.47 10*6/MM3 (ref 3.77–5.28)
SODIUM SERPL-SCNC: 141 MMOL/L (ref 136–145)
TRIGL SERPL-MCNC: 78 MG/DL (ref 0–150)
VLDLC SERPL CALC-MCNC: 14 MG/DL (ref 5–40)
WBC # BLD AUTO: 8.05 10*3/MM3 (ref 3.4–10.8)

## 2025-05-20 RX ORDER — METOPROLOL SUCCINATE 25 MG/1
37.5 TABLET, EXTENDED RELEASE ORAL DAILY
Qty: 90 TABLET | Refills: 1 | Status: SHIPPED | OUTPATIENT
Start: 2025-05-20

## 2025-05-20 NOTE — TELEPHONE ENCOUNTER
Caller: Kesha Cha    Relationship: Self    Best call back number:   Telephone Information:   Mobile [phone]     Requested Prescriptions:   Requested Prescriptions     Pending Prescriptions Disp Refills    metoprolol succinate XL (TOPROL-XL) 25 MG 24 hr tablet       Sig: Take 1.5 tablets by mouth Daily.        Pharmacy where request should be sent: [address] [phone] [facility] [phone]      Last office visit with prescribing clinician: 11/30/2023   Last telemedicine visit with prescribing clinician: Visit date not found   Next office visit with prescribing clinician: 5/19/2026     Additional details provided by patient:     Does the patient have less than a 3 day supply:  [] Yes  [] No    Would you like a call back once the refill request has been completed: [x] Yes [] No    If the office needs to give you a call back, can they leave a voicemail: [x] Yes [] No    Triston Schuster Rep   05/20/25 09:30 EDT

## 2025-05-20 NOTE — PROGRESS NOTES
Your kidney and liver functions are fine at this time.  Your cholesterol levels are good except your LDL cholesterol is just slightly elevated at 110 and needs to be below 100.  You can correct this with diet and exercise.  Your complete blood cell count is fine at this time.

## 2025-08-03 RX ORDER — COLESTIPOL HYDROCHLORIDE 1 G/1
2 TABLET ORAL 2 TIMES DAILY
Qty: 120 TABLET | Refills: 10 | Status: SHIPPED | OUTPATIENT
Start: 2025-08-03